# Patient Record
Sex: MALE | Race: WHITE | NOT HISPANIC OR LATINO | URBAN - METROPOLITAN AREA
[De-identification: names, ages, dates, MRNs, and addresses within clinical notes are randomized per-mention and may not be internally consistent; named-entity substitution may affect disease eponyms.]

---

## 2018-11-09 ENCOUNTER — INPATIENT (INPATIENT)
Facility: HOSPITAL | Age: 83
LOS: 10 days | Discharge: HOME | DRG: 286 | End: 2018-11-20
Attending: HOSPITALIST | Admitting: INTERNAL MEDICINE
Payer: MEDICARE

## 2018-11-09 VITALS
SYSTOLIC BLOOD PRESSURE: 187 MMHG | RESPIRATION RATE: 17 BRPM | OXYGEN SATURATION: 99 % | TEMPERATURE: 99 F | HEART RATE: 80 BPM | DIASTOLIC BLOOD PRESSURE: 71 MMHG

## 2018-11-09 LAB
ANION GAP SERPL CALC-SCNC: 17 MMOL/L — SIGNIFICANT CHANGE UP (ref 5–17)
BASOPHILS # BLD AUTO: 0 K/UL — SIGNIFICANT CHANGE UP (ref 0–0.2)
BASOPHILS NFR BLD AUTO: 0.2 % — SIGNIFICANT CHANGE UP (ref 0–2)
BUN SERPL-MCNC: 34 MG/DL — HIGH (ref 7–23)
CALCIUM SERPL-MCNC: 9.7 MG/DL — SIGNIFICANT CHANGE UP (ref 8.4–10.5)
CHLORIDE SERPL-SCNC: 104 MMOL/L — SIGNIFICANT CHANGE UP (ref 96–108)
CO2 SERPL-SCNC: 21 MMOL/L — LOW (ref 22–31)
CREAT SERPL-MCNC: 1.98 MG/DL — HIGH (ref 0.5–1.3)
EOSINOPHIL # BLD AUTO: 0.1 K/UL — SIGNIFICANT CHANGE UP (ref 0–0.5)
EOSINOPHIL NFR BLD AUTO: 1 % — SIGNIFICANT CHANGE UP (ref 0–6)
GLUCOSE SERPL-MCNC: 128 MG/DL — HIGH (ref 70–99)
HCT VFR BLD CALC: 35.7 % — LOW (ref 39–50)
HGB BLD-MCNC: 11.7 G/DL — LOW (ref 13–17)
LACTATE BLDV-MCNC: 2.1 MMOL/L — HIGH (ref 0.7–2)
LYMPHOCYTES # BLD AUTO: 1.6 K/UL — SIGNIFICANT CHANGE UP (ref 1–3.3)
LYMPHOCYTES # BLD AUTO: 20.4 % — SIGNIFICANT CHANGE UP (ref 13–44)
MCHC RBC-ENTMCNC: 29.5 PG — SIGNIFICANT CHANGE UP (ref 27–34)
MCHC RBC-ENTMCNC: 32.8 GM/DL — SIGNIFICANT CHANGE UP (ref 32–36)
MCV RBC AUTO: 89.9 FL — SIGNIFICANT CHANGE UP (ref 80–100)
MONOCYTES # BLD AUTO: 0.7 K/UL — SIGNIFICANT CHANGE UP (ref 0–0.9)
MONOCYTES NFR BLD AUTO: 9.3 % — SIGNIFICANT CHANGE UP (ref 2–14)
NEUTROPHILS # BLD AUTO: 5.3 K/UL — SIGNIFICANT CHANGE UP (ref 1.8–7.4)
NEUTROPHILS NFR BLD AUTO: 69.3 % — SIGNIFICANT CHANGE UP (ref 43–77)
NT-PROBNP SERPL-SCNC: 4122 PG/ML — HIGH (ref 0–300)
PLATELET # BLD AUTO: 148 K/UL — LOW (ref 150–400)
POTASSIUM SERPL-MCNC: 4.1 MMOL/L — SIGNIFICANT CHANGE UP (ref 3.5–5.3)
POTASSIUM SERPL-SCNC: 4.1 MMOL/L — SIGNIFICANT CHANGE UP (ref 3.5–5.3)
RBC # BLD: 3.97 M/UL — LOW (ref 4.2–5.8)
RBC # FLD: 15.3 % — HIGH (ref 10.3–14.5)
SODIUM SERPL-SCNC: 142 MMOL/L — SIGNIFICANT CHANGE UP (ref 135–145)
TROPONIN T, HIGH SENSITIVITY RESULT: 40 NG/L — SIGNIFICANT CHANGE UP (ref 0–51)
WBC # BLD: 7.7 K/UL — SIGNIFICANT CHANGE UP (ref 3.8–10.5)
WBC # FLD AUTO: 7.7 K/UL — SIGNIFICANT CHANGE UP (ref 3.8–10.5)

## 2018-11-09 PROCEDURE — 71045 X-RAY EXAM CHEST 1 VIEW: CPT | Mod: 26

## 2018-11-09 PROCEDURE — 99291 CRITICAL CARE FIRST HOUR: CPT

## 2018-11-09 RX ORDER — ACETAMINOPHEN 500 MG
975 TABLET ORAL ONCE
Qty: 0 | Refills: 0 | Status: DISCONTINUED | OUTPATIENT
Start: 2018-11-09 | End: 2018-11-10

## 2018-11-09 RX ORDER — AZITHROMYCIN 500 MG/1
500 TABLET, FILM COATED ORAL EVERY 24 HOURS
Qty: 0 | Refills: 0 | Status: DISCONTINUED | OUTPATIENT
Start: 2018-11-09 | End: 2018-11-10

## 2018-11-09 RX ORDER — FUROSEMIDE 40 MG
80 TABLET ORAL ONCE
Qty: 0 | Refills: 0 | Status: COMPLETED | OUTPATIENT
Start: 2018-11-09 | End: 2018-11-09

## 2018-11-09 RX ORDER — CEFTRIAXONE 500 MG/1
1 INJECTION, POWDER, FOR SOLUTION INTRAMUSCULAR; INTRAVENOUS EVERY 24 HOURS
Qty: 0 | Refills: 0 | Status: DISCONTINUED | OUTPATIENT
Start: 2018-11-09 | End: 2018-11-10

## 2018-11-09 RX ORDER — NITROGLYCERIN 6.5 MG
0.4 CAPSULE, EXTENDED RELEASE ORAL
Qty: 0 | Refills: 0 | Status: DISCONTINUED | OUTPATIENT
Start: 2018-11-09 | End: 2018-11-10

## 2018-11-09 RX ADMIN — Medication 0.4 MILLIGRAM(S): at 22:02

## 2018-11-09 RX ADMIN — Medication 80 MILLIGRAM(S): at 22:02

## 2018-11-09 RX ADMIN — CEFTRIAXONE 100 GRAM(S): 500 INJECTION, POWDER, FOR SOLUTION INTRAMUSCULAR; INTRAVENOUS at 23:21

## 2018-11-09 NOTE — ED ADULT NURSE NOTE - CHPI ED NUR SYMPTOMS NEG
no wheezing/no cough/no hemoptysis/no diaphoresis/no body aches/no headache/no fever/no chest pain/no chills

## 2018-11-09 NOTE — ED PROVIDER NOTE - CRITICAL CARE PROVIDED
direct patient care (not related to procedure)/documentation/consult w/ pt's family directly relating to pts condition/consultation with other physicians/interpretation of diagnostic studies/additional history taking

## 2018-11-09 NOTE — ED PROVIDER NOTE - OBJECTIVE STATEMENT
Nino BARRIENTOS MD PGY1: 83 M PMH CHF, diabetes, HTN, HLD p/w chest discomfort and SOB with increasing b/l LE swelling after not taking his furosemide for the past few days. No specific reason for lack of medication adherance. No radiation of pain to neck or lower extremity. 83 M PMH CHF, diabetes, HTN, HLD p/w chest discomfort and SOB with increasing b/l LE swelling after not taking his furosemide for the past 4 days. Not taking lasix because he was tired of urinating. No radiation of pain to neck or lower extremity.  Patient in significant resp distress, not able to contribute to history.  Son at bedside states no recent f/c, travel,.  Pt nod when asked re orthopnea and PND.  Significant exertional dyspnea.

## 2018-11-09 NOTE — ED PROVIDER NOTE - MEDICAL DECISION MAKING DETAILS
Nino BARRIENTOS MD PGY1: 83 M PMH CHF p/w difficulty breathing and chest discomfort since this am. WiIll stabilioze, place on BIPAP, give lasix, and admit. Nino BARRIENTOS MD PGY1: 83 M PMH CHF p/w difficulty breathing and chest discomfort since this am. WiIll stabilize, place on BIPAP, give lasix, and admit.  Attending Statement: Agree with the above.  congestive heart failure exacerbation with significant pulmonary edema, likely hyperadrenergic component given HTN.  BiPAP, SLN, labs, diurese, I/Os.  Admit.  May require CCU if no improvement on bipap.  --BMM

## 2018-11-09 NOTE — ED ADULT NURSE NOTE - OBJECTIVE STATEMENT
84 yo M w/ PMHx of CHF, anxiety presents to ED c/o difficulty breathing. Family at bedside state pt has been noted to be "working hard to breath" since today. Per family pt has been noncompliant w/ home dose of lasix over past several days. Pt appears anxious, unable to sit still, difficulty explaining complaints. Pt noted to have SpO2 88% on room air, 91% on 4 LPM O2 via nasal canula. MD Carlton at bedside, pt placed on BiPAP for respiratory distress. Pt appears less anxious w/ BiPAP in place. Pedal edema noted b/l. Pt also hypertensive & c/o chest discomfort, 0.4 mg SL nitro administered per orders w/ noted relief. Pt denies any CP, SOB, N/V, fever, chills, HA, dizziness, weakness. Pt A&Ox4, +central pulses. Abdomen soft, not tender, not distended. Safety and comfort maintained, no acute distress noted at this time.

## 2018-11-09 NOTE — ED PROVIDER NOTE - PMH
CHF (congestive heart failure)    HLD (hyperlipidemia)    HTN (hypertension)    T2DM (type 2 diabetes mellitus)

## 2018-11-09 NOTE — ED PROVIDER NOTE - PROGRESS NOTE DETAILS
BiPAP applied and SL nitro x 1 on pt presentation.  BP 180s-100s --> 150/90s with improvement of HR and work of breathing.  Patient significantly more comfortable on bipap.  Roberts placed for strict I/Os and lasix 80 mg ordered.

## 2018-11-09 NOTE — ED ADULT NURSE REASSESSMENT NOTE - NS ED NURSE REASSESS COMMENT FT1
Roberts catheter placed using sterile technique. Second RN present to confirm sterility. Draining to gravity. Secured w/ stat lock. Pt tolerated procedure well. Will cont to monitor.

## 2018-11-09 NOTE — ED ADULT NURSE NOTE - NSIMPLEMENTINTERV_GEN_ALL_ED
Implemented All Universal Safety Interventions:  [place] to call system. Call bell, personal items and telephone within reach. Instruct patient to call for assistance. Room bathroom lighting operational. Non-slip footwear when patient is off stretcher. Physically safe environment: no spills, clutter or unnecessary equipment. Stretcher in lowest position, wheels locked, appropriate side rails in place.

## 2018-11-09 NOTE — ED PROVIDER NOTE - PHYSICAL EXAMINATION
Nino BARRIENTOS MD PGY1:   PHYSICAL EXAM:    GENERAL: Uncomfortable, sitting up, difficulty breathing.   HEENT:  Atraumatic, Normocephalic  CHEST/LUNG: Chest rise equal bilaterally. Crackles bilaterally. Tachypneic.   HEART: Regular rate and rhythm. No murmur or rubs.  ABDOMEN: Soft, Nontender, Nondistended; Normoactive bowel sounds  EXTREMITIES:  2+ Peripheral Pulses. +3 pitting edema.   PSYCH: A&Ox3  SKIN: No obvious rashes or lesions GENERAL: Uncomfortable, sitting up, difficulty breathing.   HEENT:  Atraumatic, Normocephalic.  +JVD 3-4cm  CHEST/LUNG: Chest rise equal bilaterally. Crackles bilaterally. Tachypneic.   HEART: Regular rate and rhythm. No murmur or rubs.  ABDOMEN: Soft, Nontender, Nondistended; Normoactive bowel sounds  EXTREMITIES:  2+ Peripheral Pulses. +2 pitting edema to prox legs symmetrically  PSYCH: A&Ox3  SKIN: No obvious rashes or lesions    SOB significantly worse when patient ricky recumbent for repositioning in bed.

## 2018-11-10 DIAGNOSIS — N17.9 ACUTE KIDNEY FAILURE, UNSPECIFIED: ICD-10-CM

## 2018-11-10 DIAGNOSIS — J96.01 ACUTE RESPIRATORY FAILURE WITH HYPOXIA: ICD-10-CM

## 2018-11-10 DIAGNOSIS — I50.9 HEART FAILURE, UNSPECIFIED: ICD-10-CM

## 2018-11-10 DIAGNOSIS — Z71.89 OTHER SPECIFIED COUNSELING: ICD-10-CM

## 2018-11-10 DIAGNOSIS — N40.0 BENIGN PROSTATIC HYPERPLASIA WITHOUT LOWER URINARY TRACT SYMPTOMS: ICD-10-CM

## 2018-11-10 DIAGNOSIS — E11.8 TYPE 2 DIABETES MELLITUS WITH UNSPECIFIED COMPLICATIONS: ICD-10-CM

## 2018-11-10 DIAGNOSIS — R07.89 OTHER CHEST PAIN: ICD-10-CM

## 2018-11-10 DIAGNOSIS — I25.10 ATHEROSCLEROTIC HEART DISEASE OF NATIVE CORONARY ARTERY WITHOUT ANGINA PECTORIS: ICD-10-CM

## 2018-11-10 DIAGNOSIS — I50.21 ACUTE SYSTOLIC (CONGESTIVE) HEART FAILURE: ICD-10-CM

## 2018-11-10 DIAGNOSIS — I10 ESSENTIAL (PRIMARY) HYPERTENSION: ICD-10-CM

## 2018-11-10 DIAGNOSIS — R09.89 OTHER SPECIFIED SYMPTOMS AND SIGNS INVOLVING THE CIRCULATORY AND RESPIRATORY SYSTEMS: ICD-10-CM

## 2018-11-10 DIAGNOSIS — M79.89 OTHER SPECIFIED SOFT TISSUE DISORDERS: ICD-10-CM

## 2018-11-10 PROBLEM — Z00.00 ENCOUNTER FOR PREVENTIVE HEALTH EXAMINATION: Status: ACTIVE | Noted: 2018-11-10

## 2018-11-10 LAB
ALBUMIN SERPL ELPH-MCNC: 4 G/DL — SIGNIFICANT CHANGE UP (ref 3.3–5)
ALP SERPL-CCNC: 59 U/L — SIGNIFICANT CHANGE UP (ref 40–120)
ALT FLD-CCNC: 7 U/L — LOW (ref 10–45)
ANION GAP SERPL CALC-SCNC: 13 MMOL/L — SIGNIFICANT CHANGE UP (ref 5–17)
APPEARANCE UR: CLEAR — SIGNIFICANT CHANGE UP
APTT BLD: 25.3 SEC — LOW (ref 27.5–36.3)
AST SERPL-CCNC: 11 U/L — SIGNIFICANT CHANGE UP (ref 10–40)
BASOPHILS # BLD AUTO: 0 K/UL — SIGNIFICANT CHANGE UP (ref 0–0.2)
BASOPHILS NFR BLD AUTO: 0.4 % — SIGNIFICANT CHANGE UP (ref 0–2)
BILIRUB SERPL-MCNC: 0.7 MG/DL — SIGNIFICANT CHANGE UP (ref 0.2–1.2)
BILIRUB UR-MCNC: NEGATIVE — SIGNIFICANT CHANGE UP
BUN SERPL-MCNC: 34 MG/DL — HIGH (ref 7–23)
CALCIUM SERPL-MCNC: 9.5 MG/DL — SIGNIFICANT CHANGE UP (ref 8.4–10.5)
CHLORIDE SERPL-SCNC: 103 MMOL/L — SIGNIFICANT CHANGE UP (ref 96–108)
CHOLEST SERPL-MCNC: 110 MG/DL — SIGNIFICANT CHANGE UP (ref 10–199)
CK MB CFR SERPL CALC: 2.1 NG/ML — SIGNIFICANT CHANGE UP (ref 0–6.7)
CK SERPL-CCNC: 27 U/L — LOW (ref 30–200)
CO2 SERPL-SCNC: 24 MMOL/L — SIGNIFICANT CHANGE UP (ref 22–31)
COLOR SPEC: COLORLESS — SIGNIFICANT CHANGE UP
CREAT ?TM UR-MCNC: 17 MG/DL — SIGNIFICANT CHANGE UP
CREAT SERPL-MCNC: 2.13 MG/DL — HIGH (ref 0.5–1.3)
DIFF PNL FLD: NEGATIVE — SIGNIFICANT CHANGE UP
EOSINOPHIL # BLD AUTO: 0.1 K/UL — SIGNIFICANT CHANGE UP (ref 0–0.5)
EOSINOPHIL NFR BLD AUTO: 1.9 % — SIGNIFICANT CHANGE UP (ref 0–6)
GLUCOSE SERPL-MCNC: 101 MG/DL — HIGH (ref 70–99)
GLUCOSE UR QL: NEGATIVE — SIGNIFICANT CHANGE UP
HBA1C BLD-MCNC: 4.7 % — SIGNIFICANT CHANGE UP (ref 4–5.6)
HCT VFR BLD CALC: 32.2 % — LOW (ref 39–50)
HDLC SERPL-MCNC: 29 MG/DL — LOW
HGB BLD-MCNC: 10.6 G/DL — LOW (ref 13–17)
INR BLD: 1.12 RATIO — SIGNIFICANT CHANGE UP (ref 0.88–1.16)
KETONES UR-MCNC: NEGATIVE — SIGNIFICANT CHANGE UP
LACTATE SERPL-SCNC: 0.9 MMOL/L — SIGNIFICANT CHANGE UP (ref 0.7–2)
LEUKOCYTE ESTERASE UR-ACNC: NEGATIVE — SIGNIFICANT CHANGE UP
LIPID PNL WITH DIRECT LDL SERPL: 58 MG/DL — SIGNIFICANT CHANGE UP
LYMPHOCYTES # BLD AUTO: 0.8 K/UL — LOW (ref 1–3.3)
LYMPHOCYTES # BLD AUTO: 16.3 % — SIGNIFICANT CHANGE UP (ref 13–44)
MAGNESIUM SERPL-MCNC: 2.3 MG/DL — SIGNIFICANT CHANGE UP (ref 1.6–2.6)
MCHC RBC-ENTMCNC: 29.5 PG — SIGNIFICANT CHANGE UP (ref 27–34)
MCHC RBC-ENTMCNC: 32.9 GM/DL — SIGNIFICANT CHANGE UP (ref 32–36)
MCV RBC AUTO: 89.7 FL — SIGNIFICANT CHANGE UP (ref 80–100)
MONOCYTES # BLD AUTO: 0.5 K/UL — SIGNIFICANT CHANGE UP (ref 0–0.9)
MONOCYTES NFR BLD AUTO: 11 % — SIGNIFICANT CHANGE UP (ref 2–14)
NEUTROPHILS # BLD AUTO: 3.4 K/UL — SIGNIFICANT CHANGE UP (ref 1.8–7.4)
NEUTROPHILS NFR BLD AUTO: 70.3 % — SIGNIFICANT CHANGE UP (ref 43–77)
NITRITE UR-MCNC: NEGATIVE — SIGNIFICANT CHANGE UP
PH UR: 6 — SIGNIFICANT CHANGE UP (ref 5–8)
PHOSPHATE SERPL-MCNC: 4.4 MG/DL — SIGNIFICANT CHANGE UP (ref 2.5–4.5)
PLATELET # BLD AUTO: 130 K/UL — LOW (ref 150–400)
POTASSIUM SERPL-MCNC: 3.2 MMOL/L — LOW (ref 3.5–5.3)
POTASSIUM SERPL-SCNC: 3.2 MMOL/L — LOW (ref 3.5–5.3)
PROCALCITONIN SERPL-MCNC: 0.22 NG/ML — HIGH (ref 0.02–0.1)
PROT ?TM UR-MCNC: 15 MG/DL — HIGH (ref 0–12)
PROT SERPL-MCNC: 7 G/DL — SIGNIFICANT CHANGE UP (ref 6–8.3)
PROT UR-MCNC: NEGATIVE — SIGNIFICANT CHANGE UP
PROTHROM AB SERPL-ACNC: 12.8 SEC — SIGNIFICANT CHANGE UP (ref 10–12.9)
RBC # BLD: 3.59 M/UL — LOW (ref 4.2–5.8)
RBC # FLD: 15.2 % — HIGH (ref 10.3–14.5)
SODIUM SERPL-SCNC: 140 MMOL/L — SIGNIFICANT CHANGE UP (ref 135–145)
SODIUM UR-SCNC: 97 MMOL/L — SIGNIFICANT CHANGE UP
SP GR SPEC: 1.01 — LOW (ref 1.01–1.02)
TOTAL CHOLESTEROL/HDL RATIO MEASUREMENT: 3.8 RATIO — SIGNIFICANT CHANGE UP (ref 3.4–9.6)
TRIGL SERPL-MCNC: 115 MG/DL — SIGNIFICANT CHANGE UP (ref 10–149)
TROPONIN T, HIGH SENSITIVITY RESULT: 42 NG/L — SIGNIFICANT CHANGE UP (ref 0–51)
TSH SERPL-MCNC: 1.22 UIU/ML — SIGNIFICANT CHANGE UP (ref 0.27–4.2)
UROBILINOGEN FLD QL: NEGATIVE — SIGNIFICANT CHANGE UP
UUN UR-MCNC: 163 MG/DL — SIGNIFICANT CHANGE UP
WBC # BLD: 4.8 K/UL — SIGNIFICANT CHANGE UP (ref 3.8–10.5)
WBC # FLD AUTO: 4.8 K/UL — SIGNIFICANT CHANGE UP (ref 3.8–10.5)

## 2018-11-10 PROCEDURE — 12345: CPT | Mod: NC

## 2018-11-10 PROCEDURE — 99223 1ST HOSP IP/OBS HIGH 75: CPT

## 2018-11-10 PROCEDURE — 71250 CT THORAX DX C-: CPT | Mod: 26

## 2018-11-10 PROCEDURE — 99497 ADVNCD CARE PLAN 30 MIN: CPT | Mod: 25

## 2018-11-10 RX ORDER — FLUTICASONE FUROATE AND VILANTEROL TRIFENATATE 100; 25 UG/1; UG/1
1 POWDER RESPIRATORY (INHALATION)
Qty: 0 | Refills: 0 | COMMUNITY

## 2018-11-10 RX ORDER — HYDRALAZINE HCL 50 MG
100 TABLET ORAL EVERY 8 HOURS
Qty: 0 | Refills: 0 | Status: DISCONTINUED | OUTPATIENT
Start: 2018-11-10 | End: 2018-11-20

## 2018-11-10 RX ORDER — DEXTROSE 50 % IN WATER 50 %
15 SYRINGE (ML) INTRAVENOUS ONCE
Qty: 0 | Refills: 0 | Status: DISCONTINUED | OUTPATIENT
Start: 2018-11-10 | End: 2018-11-20

## 2018-11-10 RX ORDER — PANTOPRAZOLE SODIUM 20 MG/1
40 TABLET, DELAYED RELEASE ORAL
Qty: 0 | Refills: 0 | Status: DISCONTINUED | OUTPATIENT
Start: 2018-11-10 | End: 2018-11-20

## 2018-11-10 RX ORDER — ASPIRIN/CALCIUM CARB/MAGNESIUM 324 MG
81 TABLET ORAL DAILY
Qty: 0 | Refills: 0 | Status: DISCONTINUED | OUTPATIENT
Start: 2018-11-10 | End: 2018-11-20

## 2018-11-10 RX ORDER — DOXAZOSIN MESYLATE 4 MG
2 TABLET ORAL AT BEDTIME
Qty: 0 | Refills: 0 | Status: DISCONTINUED | OUTPATIENT
Start: 2018-11-10 | End: 2018-11-20

## 2018-11-10 RX ORDER — FUROSEMIDE 40 MG
60 TABLET ORAL
Qty: 0 | Refills: 0 | Status: DISCONTINUED | OUTPATIENT
Start: 2018-11-10 | End: 2018-11-12

## 2018-11-10 RX ORDER — SERTRALINE 25 MG/1
200 TABLET, FILM COATED ORAL DAILY
Qty: 0 | Refills: 0 | Status: DISCONTINUED | OUTPATIENT
Start: 2018-11-10 | End: 2018-11-20

## 2018-11-10 RX ORDER — ACETAMINOPHEN 500 MG
1000 TABLET ORAL ONCE
Qty: 0 | Refills: 0 | Status: COMPLETED | OUTPATIENT
Start: 2018-11-10 | End: 2018-11-10

## 2018-11-10 RX ORDER — POTASSIUM CHLORIDE 20 MEQ
20 PACKET (EA) ORAL
Qty: 0 | Refills: 0 | Status: COMPLETED | OUTPATIENT
Start: 2018-11-10 | End: 2018-11-10

## 2018-11-10 RX ORDER — HEPARIN SODIUM 5000 [USP'U]/ML
5000 INJECTION INTRAVENOUS; SUBCUTANEOUS EVERY 8 HOURS
Qty: 0 | Refills: 0 | Status: DISCONTINUED | OUTPATIENT
Start: 2018-11-10 | End: 2018-11-20

## 2018-11-10 RX ORDER — NIFEDIPINE 30 MG
90 TABLET, EXTENDED RELEASE 24 HR ORAL DAILY
Qty: 0 | Refills: 0 | Status: DISCONTINUED | OUTPATIENT
Start: 2018-11-10 | End: 2018-11-18

## 2018-11-10 RX ORDER — SIMVASTATIN 20 MG/1
1 TABLET, FILM COATED ORAL
Qty: 0 | Refills: 0 | COMMUNITY

## 2018-11-10 RX ORDER — INSULIN LISPRO 100/ML
VIAL (ML) SUBCUTANEOUS AT BEDTIME
Qty: 0 | Refills: 0 | Status: DISCONTINUED | OUTPATIENT
Start: 2018-11-10 | End: 2018-11-20

## 2018-11-10 RX ORDER — INFLUENZA VIRUS VACCINE 15; 15; 15; 15 UG/.5ML; UG/.5ML; UG/.5ML; UG/.5ML
0.5 SUSPENSION INTRAMUSCULAR ONCE
Qty: 0 | Refills: 0 | Status: DISCONTINUED | OUTPATIENT
Start: 2018-11-10 | End: 2018-11-20

## 2018-11-10 RX ORDER — SIMVASTATIN 20 MG/1
20 TABLET, FILM COATED ORAL AT BEDTIME
Qty: 0 | Refills: 0 | Status: DISCONTINUED | OUTPATIENT
Start: 2018-11-10 | End: 2018-11-20

## 2018-11-10 RX ORDER — DEXTROSE 50 % IN WATER 50 %
12.5 SYRINGE (ML) INTRAVENOUS ONCE
Qty: 0 | Refills: 0 | Status: DISCONTINUED | OUTPATIENT
Start: 2018-11-10 | End: 2018-11-20

## 2018-11-10 RX ORDER — SENNA PLUS 8.6 MG/1
2 TABLET ORAL AT BEDTIME
Qty: 0 | Refills: 0 | Status: DISCONTINUED | OUTPATIENT
Start: 2018-11-10 | End: 2018-11-20

## 2018-11-10 RX ORDER — OXYCODONE AND ACETAMINOPHEN 5; 325 MG/1; MG/1
1 TABLET ORAL EVERY 6 HOURS
Qty: 0 | Refills: 0 | Status: DISCONTINUED | OUTPATIENT
Start: 2018-11-10 | End: 2018-11-13

## 2018-11-10 RX ORDER — METOPROLOL TARTRATE 50 MG
50 TABLET ORAL
Qty: 0 | Refills: 0 | Status: DISCONTINUED | OUTPATIENT
Start: 2018-11-10 | End: 2018-11-20

## 2018-11-10 RX ORDER — INSULIN LISPRO 100/ML
VIAL (ML) SUBCUTANEOUS
Qty: 0 | Refills: 0 | Status: DISCONTINUED | OUTPATIENT
Start: 2018-11-10 | End: 2018-11-20

## 2018-11-10 RX ORDER — DOCUSATE SODIUM 100 MG
100 CAPSULE ORAL THREE TIMES A DAY
Qty: 0 | Refills: 0 | Status: DISCONTINUED | OUTPATIENT
Start: 2018-11-10 | End: 2018-11-20

## 2018-11-10 RX ORDER — SENNA PLUS 8.6 MG/1
2 TABLET ORAL
Qty: 0 | Refills: 0 | COMMUNITY

## 2018-11-10 RX ORDER — SODIUM CHLORIDE 9 MG/ML
1000 INJECTION, SOLUTION INTRAVENOUS
Qty: 0 | Refills: 0 | Status: DISCONTINUED | OUTPATIENT
Start: 2018-11-10 | End: 2018-11-20

## 2018-11-10 RX ORDER — BUDESONIDE AND FORMOTEROL FUMARATE DIHYDRATE 160; 4.5 UG/1; UG/1
2 AEROSOL RESPIRATORY (INHALATION)
Qty: 0 | Refills: 0 | Status: DISCONTINUED | OUTPATIENT
Start: 2018-11-10 | End: 2018-11-20

## 2018-11-10 RX ORDER — CLOPIDOGREL BISULFATE 75 MG/1
75 TABLET, FILM COATED ORAL DAILY
Qty: 0 | Refills: 0 | Status: DISCONTINUED | OUTPATIENT
Start: 2018-11-10 | End: 2018-11-20

## 2018-11-10 RX ADMIN — OXYCODONE AND ACETAMINOPHEN 1 TABLET(S): 5; 325 TABLET ORAL at 11:16

## 2018-11-10 RX ADMIN — Medication 81 MILLIGRAM(S): at 11:00

## 2018-11-10 RX ADMIN — Medication 400 MILLIGRAM(S): at 05:25

## 2018-11-10 RX ADMIN — Medication 1000 MILLIGRAM(S): at 06:42

## 2018-11-10 RX ADMIN — Medication 20 MILLIEQUIVALENT(S): at 17:29

## 2018-11-10 RX ADMIN — OXYCODONE AND ACETAMINOPHEN 1 TABLET(S): 5; 325 TABLET ORAL at 18:00

## 2018-11-10 RX ADMIN — HEPARIN SODIUM 5000 UNIT(S): 5000 INJECTION INTRAVENOUS; SUBCUTANEOUS at 15:09

## 2018-11-10 RX ADMIN — HEPARIN SODIUM 5000 UNIT(S): 5000 INJECTION INTRAVENOUS; SUBCUTANEOUS at 21:20

## 2018-11-10 RX ADMIN — Medication 60 MILLIGRAM(S): at 17:24

## 2018-11-10 RX ADMIN — BUDESONIDE AND FORMOTEROL FUMARATE DIHYDRATE 2 PUFF(S): 160; 4.5 AEROSOL RESPIRATORY (INHALATION) at 17:24

## 2018-11-10 RX ADMIN — PANTOPRAZOLE SODIUM 40 MILLIGRAM(S): 20 TABLET, DELAYED RELEASE ORAL at 11:01

## 2018-11-10 RX ADMIN — Medication 100 MILLIGRAM(S): at 21:20

## 2018-11-10 RX ADMIN — SIMVASTATIN 20 MILLIGRAM(S): 20 TABLET, FILM COATED ORAL at 21:20

## 2018-11-10 RX ADMIN — SENNA PLUS 2 TABLET(S): 8.6 TABLET ORAL at 21:20

## 2018-11-10 RX ADMIN — CLOPIDOGREL BISULFATE 75 MILLIGRAM(S): 75 TABLET, FILM COATED ORAL at 11:00

## 2018-11-10 RX ADMIN — Medication 50 MILLIGRAM(S): at 11:01

## 2018-11-10 RX ADMIN — BUDESONIDE AND FORMOTEROL FUMARATE DIHYDRATE 2 PUFF(S): 160; 4.5 AEROSOL RESPIRATORY (INHALATION) at 05:30

## 2018-11-10 RX ADMIN — OXYCODONE AND ACETAMINOPHEN 1 TABLET(S): 5; 325 TABLET ORAL at 17:25

## 2018-11-10 RX ADMIN — Medication 2 MILLIGRAM(S): at 21:20

## 2018-11-10 RX ADMIN — Medication 50 MILLIGRAM(S): at 17:25

## 2018-11-10 RX ADMIN — AZITHROMYCIN 250 MILLIGRAM(S): 500 TABLET, FILM COATED ORAL at 01:07

## 2018-11-10 RX ADMIN — Medication 20 MILLIEQUIVALENT(S): at 20:22

## 2018-11-10 RX ADMIN — HEPARIN SODIUM 5000 UNIT(S): 5000 INJECTION INTRAVENOUS; SUBCUTANEOUS at 05:57

## 2018-11-10 RX ADMIN — Medication 60 MILLIGRAM(S): at 05:23

## 2018-11-10 RX ADMIN — Medication 100 MILLIGRAM(S): at 11:00

## 2018-11-10 RX ADMIN — OXYCODONE AND ACETAMINOPHEN 1 TABLET(S): 5; 325 TABLET ORAL at 12:00

## 2018-11-10 RX ADMIN — Medication 90 MILLIGRAM(S): at 20:21

## 2018-11-10 RX ADMIN — SERTRALINE 200 MILLIGRAM(S): 25 TABLET, FILM COATED ORAL at 11:01

## 2018-11-10 NOTE — H&P ADULT - HISTORY OF PRESENT ILLNESS
83M, trilingual Chadian/Martiniquais speaking, c hx CAD s/p 6 stents (last stent 6 years ago), CHF, CKD (unknown baseline), DM2, HTN, HLD, chronic R lower back pain and chronic suprapubic pain, former smoker, presents with 3 days of increasing SOB, leg swelling, and chest pain.    History obtained from patient. At baseline, pt ambulates with a walker, and has significant difficulty ambulating up a flight of stairs. He has been taking all of his medications except the lasix for the past 4 days, because he was urinating too often. [place] [facility] [place] [facility] in [place], but since his wife passed away 1.5 months ago, has been living with his daughter Mary Verdin in [place]. Pt was last hospitalized about 6-7 months ago with the same SOB. Over the past 3 days, reports increasing SOB, respiratory distress, leg swelling, some right chest pain that radiates to b/l arms, and some right flank pain that started yesterday. Denies fevers, chills, URI symptoms. Not on O2 at home. He has had 4 falls in 2018, but not recently. BM yesterday.    VS: Tm 98.8, P 105, /71, R 32, 85% RA when I saw the patient. 100% on bipap  In the ED, received azithro, ceftri, lasix 80IV, tylenol, NTG 83M, trilingual Tamazight/Malawian speaking, c hx CAD s/p 6 stents (last stent 6 years ago), CHF, CKD (unknown baseline), DM2, HTN, HLD, chronic R lower back pain and chronic suprapubic pain, former smoker, presents with 3 days of increasing SOB, leg swelling, and chest pain.    History obtained from patient. At baseline, pt ambulates with a walker, and has significant difficulty ambulating up a flight of stairs. He has been taking all of his medications except the lasix for the past 4 days, because he was urinating too often. [place] [facility] [place] [facility] in [place], but since his wife passed away 1.5 months ago, has been living with his daughter Mary Verdin in [place]. Pt was last hospitalized about 6-7 months ago with the same SOB. Over the past 3 days, reports increasing SOB, respiratory distress, leg swelling, some right chest pain that radiates to b/l arms, and some right flank pain that started yesterday. Denies fevers, chills, URI symptoms. Not on O2 at home. He has had 4 falls in 2018, but not recently. BM yesterday.    VS: Tm 98.8, P 105, /71, R 32, 85% RA when I saw the patient. 100% on bipap  In the ED, received azithro, ceftri, lasix 80IV, tylenol, NTG    ISTOP Reference #: [id number]  Rx Written	Rx Dispensed	Drug	Quantity	Days Supply	Prescriber Name  10/08/2018	10/09/2018	oxycodone-acetaminophen 5-325 mg tab	60	30	Anthony Arellano MD  08/28/2018	09/10/2018	oxycodone-acetaminophen 5-325 mg tab	60	30	Anthony Arellano MD 83M, trilingual Romanian/Central African speaking, c hx CAD s/p 6 stents (last stent 6 years ago), CHF, CKD (unknown baseline), DM2, HTN, HLD, chronic R lower back pain and chronic suprapubic pain, former smoker, presents with 3 days of increasing SOB, leg swelling, and chest pain.    History obtained from patient. At baseline, pt ambulates with a walker, and has significant difficulty ambulating up a flight of stairs. He has been taking all of his medications except the lasix for the past 4 days, because he was urinating too often. Pt [place] [facility] and goes to [facility] in [place], but since his wife passed away 1.5 months ago, has been living with his daughter Mary Verdin in [place]. Pt was last hospitalized about 6-7 months ago with the same SOB. Over the past 3 days, reports increasing SOB, respiratory distress, leg swelling, some right chest pain that radiates to b/l arms, and some right flank pain that started yesterday. Denies fevers, chills, URI symptoms. Not on O2 at home. He has had 4 falls in 2018, but not recently. BM yesterday.    VS: Tm 98.8, P 105, /71, R 32, 85% RA when I saw the patient. 100% on bipap  In the ED, received azithro, ceftri, lasix 80IV, tylenol, NTG    ISTOP Reference #: [id number]  Rx Written	Rx Dispensed	Drug	Quantity	Days Supply	Prescriber Name  10/08/2018	10/09/2018	oxycodone-acetaminophen 5-325 mg tab	60	30	Anthony Arellano MD  08/28/2018	09/10/2018	oxycodone-acetaminophen 5-325 mg tab	60	30	Anthony Arellano MD  08/29/2018	08/30/2018	lorazepam 1 mg tablet	60	30	Allan Monreal

## 2018-11-10 NOTE — H&P ADULT - ASSESSMENT
83M, trilingual Angolan/Estonian speaking, c hx CAD s/p 6 stents (last stent 6 years ago), CHF, CKD (unknown baseline), DM2, HTN, HLD, chronic R lower back pain and chronic suprapubic pain, former smoker, pw hypoxic resp failure 2/2 pulm edema likely 2/2 CHF exacerbation and poss RAMÍREZ.

## 2018-11-10 NOTE — H&P ADULT - PROBLEM SELECTOR PLAN 8
- pt requests full code  - pt has 4 children, but wants his daughter Mary Perez to make decisions for him if pt is unable to.  - pt requests full medical management  - time spent 20 min - pt's bmp glc is 120s.  - pt states he has been giving himself variable lantus, but usually around 7U a night. but he didn't give himself anything lantus last night.  - check a1c, tsh, lipids

## 2018-11-10 NOTE — H&P ADULT - PROBLEM SELECTOR PLAN 1
- 2/2 pulm edema  - cont diuresis as below  - cont bipap for now, and trial of NC in AM  - if persistent hypoxia, consider CT chest  - hold off antibiotics, as pt does not endorse s/s active infection/URI/PNA symptoms

## 2018-11-10 NOTE — H&P ADULT - NSHPPHYSICALEXAM_GEN_ALL_CORE
PHYSICAL EXAM:   GENERAL: Alert. Not confused. No acute distress. Not thin. Not cachectic. Not obese.  HEAD:  Atraumatic. Normocephalic.  EYES: EOMI. PERRLA. Normal conjunctiva/sclera.  ENT: Neck supple. No JVD. Moist oral mucosa. Not edentulous. No thrush.  LYMPH: Normal supraclavicular/cervical lymph nodes.   CARDIAC: +tachy, Not kim. Regular rhythm. Not irregularly irregular. S1. S2. +Grade 4 systolic murmur. No rub. No distant heart sounds.  LUNG/CHEST: No wheezes. +rales R>L. No rhonchi.  ABDOMEN: Soft. +left inguinal/suprapubic tenderness. No distension. No fluid wave. Normal bowel sounds.  BACK: No midline/vertebral tenderness. +right flank tenderness.  VASCULAR: +2 b/l radial or ulnar pulses. Palpable DP pulses.  EXTREMITIES:  No clubbing. No cyanosis. +2 b/l LE pitting edema. Moving all 4.  NEUROLOGY: A&Ox3. Non-focal exam. Cranial nerves intact. Normal speech. Sensation intact.  PSYCH: Normal behavior. Normal affect.  SKIN: No jaundice. No erythema. No rash/lesion.  Vascular Access:     ICU Vital Signs Last 24 Hrs  T(C): 37.1 (09 Nov 2018 21:10), Max: 37.1 (09 Nov 2018 21:10)  T(F): 98.8 (09 Nov 2018 21:10), Max: 98.8 (09 Nov 2018 21:10)  HR: 66 (10 Nov 2018 03:48) (64 - 105)  BP: 170/60 (10 Nov 2018 03:48) (131/74 - 188/73)  BP(mean): --  ABP: --  ABP(mean): --  RR: 24 (10 Nov 2018 05:00) (17 - 32)  SpO2: 85% (10 Nov 2018 05:00) (85% - 100%)      I&O's Summary    09 Nov 2018 07:01  -  10 Nov 2018 05:13  --------------------------------------------------------  IN: 0 mL / OUT: 2000 mL / NET: -2000 mL

## 2018-11-10 NOTE — H&P ADULT - PROBLEM SELECTOR PROBLEM 6
Benign prostatic hyperplasia without lower urinary tract symptoms Coronary artery disease involving native coronary artery of native heart, angina presence unspecified

## 2018-11-10 NOTE — H&P ADULT - NSHPOUTPATIENTPROVIDERS_GEN_ALL_CORE
Dr. Allan Monreal (PMD [phone]), Dr. Varun Zapata (Card [phone]), Dr. Chang Matthews (Renal [phone]), Dr. Anthony Arellano (pain [phone])

## 2018-11-10 NOTE — H&P ADULT - PROBLEM SELECTOR PLAN 3
- doubt plaque rupture acs  - trend CE  - tele  - cont asa, plavix  - hold off hep gtt  - consider further ischemic eval pending resolution of CHF exacerbation  - cont metoprolol, hydralazine prn

## 2018-11-10 NOTE — H&P ADULT - PROBLEM SELECTOR PLAN 7
- cont pt's home BP meds at reduced doses  - cont metoprolol 50 BID + nifedpine 90 xl + hydralazine 100 PRN

## 2018-11-10 NOTE — H&P ADULT - REASON FOR ADMISSION
Anesthetic History   No history of anesthetic complications            Review of Systems / Medical History  Patient summary reviewed, nursing notes reviewed and pertinent labs reviewed    Pulmonary  Within defined limits                 Neuro/Psych   Within defined limits           Cardiovascular  Within defined limits                     GI/Hepatic/Renal  Within defined limits              Endo/Other  Within defined limits           Other Findings              Physical Exam    Airway  Mallampati: II  TM Distance: 4 - 6 cm  Neck ROM: normal range of motion   Mouth opening: Normal     Cardiovascular    Rhythm: regular  Rate: normal         Dental  No notable dental hx       Pulmonary  Breath sounds clear to auscultation               Abdominal         Other Findings            Anesthetic Plan    ASA: 2  Anesthesia type: epidural            Anesthetic plan and risks discussed with: Patient
respiratory distress, SOB, chest pain

## 2018-11-10 NOTE — H&P ADULT - PROBLEM SELECTOR PLAN 2
- systolic murmur on exam  - tele  - TTE  - CE  - lasix 60 IV BID for now  - strict I&O  - cont metoprolol, nifedpine

## 2018-11-10 NOTE — H&P ADULT - PROBLEM SELECTOR PROBLEM 8
Advanced care planning/counseling discussion Type 2 diabetes mellitus with complication, with long-term current use of insulin

## 2018-11-10 NOTE — H&P ADULT - NSHPREVIEWOFSYSTEMS_GEN_ALL_CORE
REVIEW OF SYSTEMS:  CONSTITUTIONAL: No weakness. No fevers. No chills. No rigors. No weight loss. No poor appetite.  EYES: No visual changes. No eye pain.  ENT: No hearing difficulty. No vertigo. No dysphagia. No Sinusitis/rhinorrhea.  NECK: No pain. No stiffness/rigidity.  CARDIAC: +chest pain. No palpitations.  RESPIRATORY: No cough. +SOB. No hemoptysis.  GASTROINTESTINAL: +abdominal pain. +nausea. No vomiting. No hematemesis. No diarrhea. No constipation. No melena. No hematochezia.  GENITOURINARY: No dysuria. No frequency. No hesitancy. No hematuria.  NEUROLOGICAL: No numbness/tingling. No focal weakness. No incontinence. No headache.  BACK: +back pain.  EXTREMITIES: +lower extremity edema. Full ROM.  SKIN: No rashes. No itching. No other lesions.  PSYCHIATRIC: No depression. No anxiety. No SI/HI.  ALLERGIC: No lip swelling. No hives.  All other review of systems is negative unless indicated above.  Unless indicated above, unable to assess ROS 2/2

## 2018-11-10 NOTE — H&P ADULT - PROBLEM SELECTOR PLAN 6
- cont doxazosin  - TOV in 1-2 days with clinical improvement - cont asa, plavix as above + zocor  - ischemic eval once clinically improved

## 2018-11-10 NOTE — H&P ADULT - PROBLEM SELECTOR PLAN 4
- unknown baseline Cr  - cont jimenez for now  - diuresis as above  - urine lytes  - renal u/s. also to eval right flank pain. - unknown baseline Cr  - cont jimenez for now  - diuresis as above  - urine lytes  - renal u/s. also to eval right flank pain.  - percocet PRN for chronic back pain

## 2018-11-10 NOTE — H&P ADULT - NSHPSOCIALHISTORY_GEN_ALL_CORE
Social History:    Marital Status: (  ) , (  ) Single, (  ) , ( x ) , (  )   # of Children: 2 sons, 2 daughters  Lives with: (  ) alone, ( x ) children, (  ) spouse, (  ) parents, (  ) siblings, (  ) friends, (  ) other:   Occupation: retired     Substance Use/Illicit Drugs: (  ) never used vs other:   Tobacco Usage: (  ) never smoked, ( x ) former smoker, (  ) current smoker and Total Pack-Years: 45 years  Last Alcohol Usage/Frequency/Amount/Withdrawal/Hx of Abuse:  1 year ago  Foreign travel/Animal exposure:

## 2018-11-10 NOTE — H&P ADULT - NSHPLABSRESULTS_GEN_ALL_CORE
Personally reviewed labs.   Personally reviewed imaging.   Personally reviewed EKG. ST rate 104, . Q wave in V1. LVH. No ST or TW changes.                          11.7   7.7   )-----------( 148      ( 2018 22:34 )             35.7           142  |  104  |  34<H>  ----------------------------<  128<H>  4.1   |  21<L>  |  1.98<H>    Ca    9.7      2018 22:34                      Urinalysis Basic - ( 2018 23:40 )    Color: Colorless / Appearance: Clear / S.007 / pH: x  Gluc: x / Ketone: Negative  / Bili: Negative / Urobili: Negative   Blood: x / Protein: Negative / Nitrite: Negative   Leuk Esterase: Negative / RBC: x / WBC x   Sq Epi: x / Non Sq Epi: x / Bacteria: x

## 2018-11-10 NOTE — H&P ADULT - PROBLEM SELECTOR PLAN 10
- pt requests full code  - pt has 4 children, but wants his daughter Mary Perez to make decisions for him if pt is unable to.  - pt requests full medical management  - time spent 20 min

## 2018-11-11 LAB
ANION GAP SERPL CALC-SCNC: 13 MMOL/L — SIGNIFICANT CHANGE UP (ref 5–17)
BUN SERPL-MCNC: 44 MG/DL — HIGH (ref 7–23)
CALCIUM SERPL-MCNC: 9.1 MG/DL — SIGNIFICANT CHANGE UP (ref 8.4–10.5)
CHLORIDE SERPL-SCNC: 105 MMOL/L — SIGNIFICANT CHANGE UP (ref 96–108)
CO2 SERPL-SCNC: 27 MMOL/L — SIGNIFICANT CHANGE UP (ref 22–31)
CREAT SERPL-MCNC: 2.47 MG/DL — HIGH (ref 0.5–1.3)
CULTURE RESULTS: NO GROWTH — SIGNIFICANT CHANGE UP
GLUCOSE SERPL-MCNC: 137 MG/DL — HIGH (ref 70–99)
HCT VFR BLD CALC: 29.1 % — LOW (ref 39–50)
HGB BLD-MCNC: 9.5 G/DL — LOW (ref 13–17)
MCHC RBC-ENTMCNC: 30.1 PG — SIGNIFICANT CHANGE UP (ref 27–34)
MCHC RBC-ENTMCNC: 32.6 GM/DL — SIGNIFICANT CHANGE UP (ref 32–36)
MCV RBC AUTO: 92.1 FL — SIGNIFICANT CHANGE UP (ref 80–100)
PLATELET # BLD AUTO: 104 K/UL — LOW (ref 150–400)
POTASSIUM SERPL-MCNC: 2.9 MMOL/L — CRITICAL LOW (ref 3.5–5.3)
POTASSIUM SERPL-SCNC: 2.9 MMOL/L — CRITICAL LOW (ref 3.5–5.3)
RBC # BLD: 3.16 M/UL — LOW (ref 4.2–5.8)
RBC # FLD: 15.8 % — HIGH (ref 10.3–14.5)
SODIUM SERPL-SCNC: 145 MMOL/L — SIGNIFICANT CHANGE UP (ref 135–145)
SPECIMEN SOURCE: SIGNIFICANT CHANGE UP
WBC # BLD: 3.55 K/UL — LOW (ref 3.8–10.5)
WBC # FLD AUTO: 3.55 K/UL — LOW (ref 3.8–10.5)

## 2018-11-11 PROCEDURE — 99233 SBSQ HOSP IP/OBS HIGH 50: CPT

## 2018-11-11 RX ORDER — ACETAMINOPHEN 500 MG
650 TABLET ORAL ONCE
Qty: 0 | Refills: 0 | Status: COMPLETED | OUTPATIENT
Start: 2018-11-11 | End: 2018-11-11

## 2018-11-11 RX ORDER — POTASSIUM CHLORIDE 20 MEQ
40 PACKET (EA) ORAL ONCE
Qty: 0 | Refills: 0 | Status: COMPLETED | OUTPATIENT
Start: 2018-11-11 | End: 2018-11-11

## 2018-11-11 RX ORDER — ACETAMINOPHEN 500 MG
1000 TABLET ORAL ONCE
Qty: 0 | Refills: 0 | Status: COMPLETED | OUTPATIENT
Start: 2018-11-11 | End: 2018-11-11

## 2018-11-11 RX ORDER — POTASSIUM CHLORIDE 20 MEQ
40 PACKET (EA) ORAL EVERY 4 HOURS
Qty: 0 | Refills: 0 | Status: DISCONTINUED | OUTPATIENT
Start: 2018-11-11 | End: 2018-11-11

## 2018-11-11 RX ORDER — POTASSIUM CHLORIDE 20 MEQ
40 PACKET (EA) ORAL EVERY 4 HOURS
Qty: 0 | Refills: 0 | Status: COMPLETED | OUTPATIENT
Start: 2018-11-11 | End: 2018-11-11

## 2018-11-11 RX ADMIN — OXYCODONE AND ACETAMINOPHEN 1 TABLET(S): 5; 325 TABLET ORAL at 02:36

## 2018-11-11 RX ADMIN — HEPARIN SODIUM 5000 UNIT(S): 5000 INJECTION INTRAVENOUS; SUBCUTANEOUS at 13:15

## 2018-11-11 RX ADMIN — Medication 40 MILLIEQUIVALENT(S): at 06:40

## 2018-11-11 RX ADMIN — CLOPIDOGREL BISULFATE 75 MILLIGRAM(S): 75 TABLET, FILM COATED ORAL at 12:23

## 2018-11-11 RX ADMIN — Medication 100 MILLIGRAM(S): at 21:26

## 2018-11-11 RX ADMIN — HEPARIN SODIUM 5000 UNIT(S): 5000 INJECTION INTRAVENOUS; SUBCUTANEOUS at 21:26

## 2018-11-11 RX ADMIN — Medication 50 MILLIGRAM(S): at 17:06

## 2018-11-11 RX ADMIN — Medication 60 MILLIGRAM(S): at 17:06

## 2018-11-11 RX ADMIN — BUDESONIDE AND FORMOTEROL FUMARATE DIHYDRATE 2 PUFF(S): 160; 4.5 AEROSOL RESPIRATORY (INHALATION) at 17:53

## 2018-11-11 RX ADMIN — Medication 2 MILLIGRAM(S): at 21:26

## 2018-11-11 RX ADMIN — Medication 40 MILLIEQUIVALENT(S): at 09:05

## 2018-11-11 RX ADMIN — BUDESONIDE AND FORMOTEROL FUMARATE DIHYDRATE 2 PUFF(S): 160; 4.5 AEROSOL RESPIRATORY (INHALATION) at 05:18

## 2018-11-11 RX ADMIN — Medication 650 MILLIGRAM(S): at 14:15

## 2018-11-11 RX ADMIN — Medication 81 MILLIGRAM(S): at 12:23

## 2018-11-11 RX ADMIN — Medication 50 MILLIGRAM(S): at 05:20

## 2018-11-11 RX ADMIN — SIMVASTATIN 20 MILLIGRAM(S): 20 TABLET, FILM COATED ORAL at 21:26

## 2018-11-11 RX ADMIN — OXYCODONE AND ACETAMINOPHEN 1 TABLET(S): 5; 325 TABLET ORAL at 01:33

## 2018-11-11 RX ADMIN — HEPARIN SODIUM 5000 UNIT(S): 5000 INJECTION INTRAVENOUS; SUBCUTANEOUS at 05:19

## 2018-11-11 RX ADMIN — Medication 650 MILLIGRAM(S): at 13:46

## 2018-11-11 RX ADMIN — PANTOPRAZOLE SODIUM 40 MILLIGRAM(S): 20 TABLET, DELAYED RELEASE ORAL at 06:10

## 2018-11-11 RX ADMIN — Medication 100 MILLIGRAM(S): at 05:19

## 2018-11-11 RX ADMIN — SERTRALINE 200 MILLIGRAM(S): 25 TABLET, FILM COATED ORAL at 12:24

## 2018-11-11 RX ADMIN — Medication 90 MILLIGRAM(S): at 05:21

## 2018-11-11 RX ADMIN — Medication 60 MILLIGRAM(S): at 05:19

## 2018-11-11 RX ADMIN — Medication 1: at 17:06

## 2018-11-11 RX ADMIN — Medication 40 MILLIEQUIVALENT(S): at 13:15

## 2018-11-11 NOTE — PHYSICAL THERAPY INITIAL EVALUATION ADULT - ADDITIONAL COMMENTS
Pt lives with his DTR in a private house w/ 4 steps and 1 HR to neg. Pt amb w/ RW PTA. Pt required assistance from family w/ ADL's PTA.

## 2018-11-11 NOTE — PHYSICAL THERAPY INITIAL EVALUATION ADULT - GAIT TRAINING, PT EVAL
GOAL: Patient will ambulate 100 feet independently with RW in 1 week. Pt will be able to neg 4 steps w/ 1 HR and sup.

## 2018-11-11 NOTE — PHYSICAL THERAPY INITIAL EVALUATION ADULT - PATIENT/FAMILY/SIGNIFICANT OTHER GOALS STATEMENT, PT EVAL
Progress Note


pt seen and examed, d/w PA about key points of dignosis and care plan, agreed 

current management, for details please referral to PA's  note 


Slowly improving, I feel she improved at least  30% compared to yesterday 


although bed sitting up, we'll continue slowly tapering of steroid, continue 

breathing treatment, will agree to send to [facility] rehabilitation if bed 

available today Pt wants to get better

## 2018-11-11 NOTE — PROGRESS NOTE ADULT - SUBJECTIVE AND OBJECTIVE BOX
Patient is a 83y old  Male who presents with a chief complaint of respiratory distress, SOB, chest pain (11 Nov 2018 13:26)      SUBJECTIVE / OVERNIGHT EVENTS: Patient sitting in chair, not in distress. Patient reports suprapubic discomfort, other wise no new complaints.   Tele no events.   ROS other wise negative.     T(C): 36.6 (11-11-18 @ 11:17), Max: 36.6 (11-11-18 @ 11:17)  HR: 71 (11-11-18 @ 17:05) (58 - 71)  BP: 158/69 (11-11-18 @ 17:05) (157/58 - 173/61)  RR: 17 (11-11-18 @ 11:17) (17 - 17)  SpO2: 98% (11-11-18 @ 11:17) (95% - 98%)    MEDICATIONS  (STANDING):  aspirin enteric coated 81 milliGRAM(s) Oral daily  buDESOnide 160 MICROgram(s)/formoterol 4.5 MICROgram(s) Inhaler 2 Puff(s) Inhalation two times a day  clopidogrel Tablet 75 milliGRAM(s) Oral daily  dextrose 5%. 1000 milliLiter(s) (50 mL/Hr) IV Continuous <Continuous>  dextrose 50% Injectable 12.5 Gram(s) IV Push once  docusate sodium 100 milliGRAM(s) Oral three times a day  doxazosin 2 milliGRAM(s) Oral at bedtime  furosemide   Injectable 60 milliGRAM(s) IV Push two times a day  heparin  Injectable 5000 Unit(s) SubCutaneous every 8 hours  influenza   Vaccine 0.5 milliLiter(s) IntraMuscular once  insulin lispro (HumaLOG) corrective regimen sliding scale   SubCutaneous three times a day before meals  insulin lispro (HumaLOG) corrective regimen sliding scale   SubCutaneous at bedtime  metoprolol tartrate 50 milliGRAM(s) Oral two times a day  NIFEdipine XL 90 milliGRAM(s) Oral daily  pantoprazole    Tablet 40 milliGRAM(s) Oral before breakfast  senna 2 Tablet(s) Oral at bedtime  sertraline 200 milliGRAM(s) Oral daily  simvastatin 20 milliGRAM(s) Oral at bedtime    MEDICATIONS  (PRN):  dextrose 40% Gel 15 Gram(s) Oral once PRN Blood Glucose LESS THAN 70 milliGRAM(s)/deciliter  hydrALAZINE 100 milliGRAM(s) Oral every 8 hours PRN Systolic blood pressure > 170  oxyCODONE    5 mG/acetaminophen 325 mG 1 Tablet(s) Oral every 6 hours PRN Moderate Pain (4 - 6)    CAPILLARY BLOOD GLUCOSE      CAPILLARY BLOOD GLUCOSE      POCT Blood Glucose.: 155 mg/dL (11 Nov 2018 16:38)  POCT Blood Glucose.: 144 mg/dL (11 Nov 2018 11:43)  POCT Blood Glucose.: 134 mg/dL (11 Nov 2018 07:56)  POCT Blood Glucose.: 120 mg/dL (10 Nov 2018 21:40)      PHYSICAL EXAM:  GENERAL: NAD, well-developed  HEAD:  Atraumatic, Normocephalic  EYES: EOMI, conjunctiva and sclera clear  NECK: Supple, No JVD  CHEST/LUNG: Clear to auscultation bilaterally; No wheeze  HEART: Regular rate and rhythm; No murmurs, rubs, or gallops  ABDOMEN: Soft, Nontender, Nondistended; Bowel sounds present  EXTREMITIES:  2+ Peripheral Pulses, No clubbing, cyanosis, or edema  PSYCH: AAOx3  NEUROLOGY: non-focal  SKIN: No rashes or lesions                            9.5    3.55  )-----------( 104      ( 11 Nov 2018 08:17 )             29.1       CARDIAC MARKERS ( 10 Nov 2018 05:51 )  x     / x     / 27 U/L / x     / 2.1 ng/mL      LIVER FUNCTIONS - ( 10 Nov 2018 05:51 )  Alb: 4.0 g/dL / Pro: 7.0 g/dL / ALK PHOS: 59 U/L / ALT: 7 U/L / AST: 11 U/L / GGT: x           PT/INR - ( 10 Nov 2018 05:51 )   PT: 12.8 sec;   INR: 1.12 ratio         PTT - ( 10 Nov 2018 05:51 )  PTT:25.3 sec  145|105|44<137  2.9|27|2.47  9.1,--,--  11-11 @ 05:28        RADIOLOGY & ADDITIONAL TESTS:    Imaging Personally Reviewed:    Consultant(s) Notes Reviewed:      Care Discussed with Consultants/Other Providers:

## 2018-11-11 NOTE — PHYSICAL THERAPY INITIAL EVALUATION ADULT - CRITERIA FOR SKILLED THERAPEUTIC INTERVENTIONS
impairments found/predicted duration of therapy intervention/risk reduction/prevention/anticipated discharge recommendation/anticipated equipment needs at discharge

## 2018-11-11 NOTE — PROGRESS NOTE ADULT - PROBLEM SELECTOR PLAN 2
- systolic murmur on exam  - tele  - TTE  - CE  - lasix 40 mg BID for now.   - strict I&O  - cont metoprolol, nifedpine

## 2018-11-11 NOTE — CONSULT NOTE ADULT - ASSESSMENT
83 white male with a history of HTN, CAD, CHF, DM. PVD, HLD, CKD with anemia now admitted with a history of SOB, FLOREZ and leg swelling. He is now diuresing well with IV lasix. Hypokalemic metabolic alkalosis is due to diuretics. Will supplement potassium as ordered. Will also obtain a renal sonogram to assess the kidney size. Spoke to the entire family at bed side. Avoid nephrotoxics including IV contrast and NSAIDS at this time.

## 2018-11-11 NOTE — PHYSICAL THERAPY INITIAL EVALUATION ADULT - DISCHARGE DISPOSITION, PT EVAL
To improve functional mobility, balance, strength and endurance. Pt's family will assist with ADL's./home w/ home PT/home w/ assist

## 2018-11-11 NOTE — PROGRESS NOTE ADULT - PROBLEM SELECTOR PLAN 8
- pt's bmp glc is 120s.  - pt states he has been giving himself variable lantus, but usually around 7U a night.

## 2018-11-11 NOTE — PROGRESS NOTE ADULT - PROBLEM SELECTOR PLAN 1
- 2/2 pulm edema  - cont diuresis as below  - doing well on NC Oxygen, off Bipap.   - titrate down Oxygen am tomorrow.   - CT chest show b/l pleural effusions, pulmonary edema.   - hold off antibiotics, as pt does not endorse s/s active infection/URI/PNA symptoms

## 2018-11-11 NOTE — PROGRESS NOTE ADULT - PROBLEM SELECTOR PLAN 4
TOV today, continue with diuresis, patients renal consulted.   - urine lytes  - renal u/s. also to eval right flank pain.  - percocet PRN for chronic back pain

## 2018-11-11 NOTE — PHYSICAL THERAPY INITIAL EVALUATION ADULT - PERTINENT HX OF CURRENT PROBLEM, REHAB EVAL
83M, trilingual [place]i/[place] speaking, c hx CAD s/p 6 stents (last stent 6 years ago), CHF, CKD (unknown baseline), DM2, HTN, HLD, chronic R lower back pain and chronic suprapubic pain, former smoker, p/w hypoxic resp failure 2/2 pulm edema likely 2/2 CHF exacerbation and poss RAMÍREZ.

## 2018-11-11 NOTE — PROGRESS NOTE ADULT - ASSESSMENT
83M, trilingual South Korean/American speaking, c hx CAD s/p 6 stents (last stent 6 years ago), CHF, CKD (unknown baseline), DM2, HTN, HLD, chronic R lower back pain and chronic suprapubic pain, former smoker, pw hypoxic resp failure 2/2 pulm edema likely 2/2 CHF exacerbation and poss RAMÍREZ.

## 2018-11-12 ENCOUNTER — TRANSCRIPTION ENCOUNTER (OUTPATIENT)
Age: 83
End: 2018-11-12

## 2018-11-12 LAB
ANION GAP SERPL CALC-SCNC: 16 MMOL/L — SIGNIFICANT CHANGE UP (ref 5–17)
BUN SERPL-MCNC: 51 MG/DL — HIGH (ref 7–23)
CALCIUM SERPL-MCNC: 9 MG/DL — SIGNIFICANT CHANGE UP (ref 8.4–10.5)
CHLORIDE SERPL-SCNC: 102 MMOL/L — SIGNIFICANT CHANGE UP (ref 96–108)
CO2 SERPL-SCNC: 26 MMOL/L — SIGNIFICANT CHANGE UP (ref 22–31)
CREAT SERPL-MCNC: 2.5 MG/DL — HIGH (ref 0.5–1.3)
GLUCOSE BLDC GLUCOMTR-MCNC: 120 MG/DL — HIGH (ref 70–99)
GLUCOSE BLDC GLUCOMTR-MCNC: 145 MG/DL — HIGH (ref 70–99)
GLUCOSE SERPL-MCNC: 139 MG/DL — HIGH (ref 70–99)
HCT VFR BLD CALC: 29.7 % — LOW (ref 39–50)
HGB BLD-MCNC: 9.3 G/DL — LOW (ref 13–17)
MCHC RBC-ENTMCNC: 29.5 PG — SIGNIFICANT CHANGE UP (ref 27–34)
MCHC RBC-ENTMCNC: 31.3 GM/DL — LOW (ref 32–36)
MCV RBC AUTO: 94.3 FL — SIGNIFICANT CHANGE UP (ref 80–100)
PLATELET # BLD AUTO: 117 K/UL — LOW (ref 150–400)
POTASSIUM SERPL-MCNC: 3.2 MMOL/L — LOW (ref 3.5–5.3)
POTASSIUM SERPL-SCNC: 3.2 MMOL/L — LOW (ref 3.5–5.3)
RBC # BLD: 3.15 M/UL — LOW (ref 4.2–5.8)
RBC # FLD: 15.5 % — HIGH (ref 10.3–14.5)
SODIUM SERPL-SCNC: 144 MMOL/L — SIGNIFICANT CHANGE UP (ref 135–145)
WBC # BLD: 3.56 K/UL — LOW (ref 3.8–10.5)
WBC # FLD AUTO: 3.56 K/UL — LOW (ref 3.8–10.5)

## 2018-11-12 PROCEDURE — 99233 SBSQ HOSP IP/OBS HIGH 50: CPT

## 2018-11-12 PROCEDURE — 93306 TTE W/DOPPLER COMPLETE: CPT | Mod: 26

## 2018-11-12 PROCEDURE — 93970 EXTREMITY STUDY: CPT | Mod: 26

## 2018-11-12 PROCEDURE — 76775 US EXAM ABDO BACK WALL LIM: CPT | Mod: 26

## 2018-11-12 RX ORDER — POTASSIUM CHLORIDE 20 MEQ
40 PACKET (EA) ORAL EVERY 4 HOURS
Qty: 0 | Refills: 0 | Status: COMPLETED | OUTPATIENT
Start: 2018-11-12 | End: 2018-11-12

## 2018-11-12 RX ORDER — FUROSEMIDE 40 MG
40 TABLET ORAL
Qty: 0 | Refills: 0 | Status: DISCONTINUED | OUTPATIENT
Start: 2018-11-12 | End: 2018-11-18

## 2018-11-12 RX ADMIN — HEPARIN SODIUM 5000 UNIT(S): 5000 INJECTION INTRAVENOUS; SUBCUTANEOUS at 22:30

## 2018-11-12 RX ADMIN — OXYCODONE AND ACETAMINOPHEN 1 TABLET(S): 5; 325 TABLET ORAL at 07:47

## 2018-11-12 RX ADMIN — Medication 40 MILLIEQUIVALENT(S): at 08:55

## 2018-11-12 RX ADMIN — Medication 100 MILLIGRAM(S): at 13:46

## 2018-11-12 RX ADMIN — SIMVASTATIN 20 MILLIGRAM(S): 20 TABLET, FILM COATED ORAL at 22:29

## 2018-11-12 RX ADMIN — SENNA PLUS 2 TABLET(S): 8.6 TABLET ORAL at 22:29

## 2018-11-12 RX ADMIN — BUDESONIDE AND FORMOTEROL FUMARATE DIHYDRATE 2 PUFF(S): 160; 4.5 AEROSOL RESPIRATORY (INHALATION) at 05:16

## 2018-11-12 RX ADMIN — BUDESONIDE AND FORMOTEROL FUMARATE DIHYDRATE 2 PUFF(S): 160; 4.5 AEROSOL RESPIRATORY (INHALATION) at 17:07

## 2018-11-12 RX ADMIN — Medication 100 MILLIGRAM(S): at 22:29

## 2018-11-12 RX ADMIN — OXYCODONE AND ACETAMINOPHEN 1 TABLET(S): 5; 325 TABLET ORAL at 17:57

## 2018-11-12 RX ADMIN — Medication 4: at 12:12

## 2018-11-12 RX ADMIN — OXYCODONE AND ACETAMINOPHEN 1 TABLET(S): 5; 325 TABLET ORAL at 08:30

## 2018-11-12 RX ADMIN — OXYCODONE AND ACETAMINOPHEN 1 TABLET(S): 5; 325 TABLET ORAL at 19:24

## 2018-11-12 RX ADMIN — Medication 100 MILLIGRAM(S): at 04:16

## 2018-11-12 RX ADMIN — Medication 81 MILLIGRAM(S): at 12:12

## 2018-11-12 RX ADMIN — HEPARIN SODIUM 5000 UNIT(S): 5000 INJECTION INTRAVENOUS; SUBCUTANEOUS at 13:48

## 2018-11-12 RX ADMIN — PANTOPRAZOLE SODIUM 40 MILLIGRAM(S): 20 TABLET, DELAYED RELEASE ORAL at 06:04

## 2018-11-12 RX ADMIN — Medication 60 MILLIGRAM(S): at 05:50

## 2018-11-12 RX ADMIN — Medication 50 MILLIGRAM(S): at 05:16

## 2018-11-12 RX ADMIN — Medication 50 MILLIGRAM(S): at 17:07

## 2018-11-12 RX ADMIN — Medication 2 MILLIGRAM(S): at 22:29

## 2018-11-12 RX ADMIN — SERTRALINE 200 MILLIGRAM(S): 25 TABLET, FILM COATED ORAL at 12:13

## 2018-11-12 RX ADMIN — Medication 90 MILLIGRAM(S): at 05:16

## 2018-11-12 RX ADMIN — Medication 40 MILLIGRAM(S): at 17:07

## 2018-11-12 RX ADMIN — Medication 40 MILLIEQUIVALENT(S): at 12:12

## 2018-11-12 RX ADMIN — Medication 100 MILLIGRAM(S): at 05:51

## 2018-11-12 RX ADMIN — HEPARIN SODIUM 5000 UNIT(S): 5000 INJECTION INTRAVENOUS; SUBCUTANEOUS at 05:16

## 2018-11-12 RX ADMIN — CLOPIDOGREL BISULFATE 75 MILLIGRAM(S): 75 TABLET, FILM COATED ORAL at 12:12

## 2018-11-12 NOTE — DISCHARGE NOTE ADULT - MEDICATION SUMMARY - MEDICATIONS TO STOP TAKING
I will STOP taking the medications listed below when I get home from the hospital:    GlipiZIDE XL 5 mg oral tablet, extended release  -- 1 tab(s) by mouth once a day I will STOP taking the medications listed below when I get home from the hospital:    GlipiZIDE XL 5 mg oral tablet, extended release  -- 1 tab(s) by mouth once a day    potassium chloride 20 mEq oral tablet, extended release  -- 1 tab(s) by mouth 3 times a day    Lasix 80 mg oral tablet  -- 1 tab(s) by mouth once a day

## 2018-11-12 NOTE — PROGRESS NOTE ADULT - PROBLEM SELECTOR PLAN 1
Would change lasix to 40mg PO BID now given lack of clinical findings of CHF.   LE without any edema and minimal lung findings. Awaiting echo eval.   Suspect decompensation due to noncompliance with diuretic per patient.   - strict I&O monitor renal function.   - cont metoprolol, nifedpine.

## 2018-11-12 NOTE — PROGRESS NOTE ADULT - PROBLEM SELECTOR PLAN 2
- 2/2 pulm edema  - cont diuresis as below  - trial and monitor off oxygen, off Bipap.   - CT chest show b/l pleural effusions, pulmonary edema.   - hold off antibiotics, as pt does not endorse s/s active infection/URI/PNA symptoms

## 2018-11-12 NOTE — DISCHARGE NOTE ADULT - MEDICATION SUMMARY - MEDICATIONS TO CHANGE
I will SWITCH the dose or number of times a day I take the medications listed below when I get home from the hospital:    Lantus 100 units/mL subcutaneous solution  -- 8 unit(s) subcutaneous once a day (at bedtime)

## 2018-11-12 NOTE — DISCHARGE NOTE ADULT - CARE PROVIDERS DIRECT ADDRESSES
,DirectAddress_Unknown,DirectAddress_Unknown,DirectAddress_Unknown,[email or web address]

## 2018-11-12 NOTE — PROGRESS NOTE ADULT - ASSESSMENT
83M, trilingual Turkmen/Honduran speaking, c hx CAD s/p 6 stents (last stent 6 years ago), CHF, CKD (unknown baseline), DM2, HTN, HLD, chronic R lower back pain and chronic suprapubic pain, former smoker, pw hypoxic resp failure 2/2 pulm edema likely 2/2 CHF exacerbation and poss RAMÍREZ.

## 2018-11-12 NOTE — PROGRESS NOTE ADULT - SUBJECTIVE AND OBJECTIVE BOX
NEPHROLOGY PROGRESS NOTE    No distress    Vital Signs Last 24 Hrs  T(C): 37 (11-12-18 @ 03:58), Max: 37 (11-12-18 @ 03:58)  T(F): 98.6 (11-12-18 @ 03:58), Max: 98.6 (11-12-18 @ 03:58)  HR: 70 (11-12-18 @ 14:12) (65 - 71)  BP: 148/57 (11-12-18 @ 06:28) (148/57 - 176/74)  RR: 18 (11-12-18 @ 06:28) (18 - 18)  SpO2: 95% (11-12-18 @ 14:12) (95% - 98%)    Gen -Conversant, in no apparent distress  Lungs - lungs w/fair air entry bilaterally  Heart - S1S2  Abd - soft, NT,  ND, + BS  Extr - no edema         LABS                             9.3    3.56  )-----------( 117      ( 12 Nov 2018 07:45 )             29.7          11-12    144  |  102  |  51<H>  ----------------------------<  139<H>  3.2<L>   |  26  |  2.50<H>    Ca    9.0      12 Nov 2018 05:35    aspirin enteric coated 81 milliGRAM(s) Oral daily  buDESOnide 160 MICROgram(s)/formoterol 4.5 MICROgram(s) Inhaler 2 Puff(s) Inhalation two times a day  clopidogrel Tablet 75 milliGRAM(s) Oral daily  dextrose 40% Gel 15 Gram(s) Oral once PRN  dextrose 5%. 1000 milliLiter(s) IV Continuous <Continuous>  dextrose 50% Injectable 12.5 Gram(s) IV Push once  docusate sodium 100 milliGRAM(s) Oral three times a day  doxazosin 2 milliGRAM(s) Oral at bedtime  furosemide    Tablet 40 milliGRAM(s) Oral two times a day  heparin  Injectable 5000 Unit(s) SubCutaneous every 8 hours  hydrALAZINE 100 milliGRAM(s) Oral every 8 hours PRN  influenza   Vaccine 0.5 milliLiter(s) IntraMuscular once  insulin lispro (HumaLOG) corrective regimen sliding scale   SubCutaneous three times a day before meals  insulin lispro (HumaLOG) corrective regimen sliding scale   SubCutaneous at bedtime  metoprolol tartrate 50 milliGRAM(s) Oral two times a day  NIFEdipine XL 90 milliGRAM(s) Oral daily  oxyCODONE    5 mG/acetaminophen 325 mG 1 Tablet(s) Oral every 6 hours PRN  pantoprazole    Tablet 40 milliGRAM(s) Oral before breakfast  senna 2 Tablet(s) Oral at bedtime  sertraline 200 milliGRAM(s) Oral daily  simvastatin 20 milliGRAM(s) Oral at bedtime    A/P:    CM, CHF  CKD, baseline Cr unknown  Rising Cr on IV diuretics noted  Agree w/PO Lasix  Avoid ACE, ARB  SONO w/o hydro, UA negative  BMP in am  Suppl K, check Mg level

## 2018-11-12 NOTE — DISCHARGE NOTE ADULT - HOME CARE AGENCY
[facility]  ([phone]).  RN visit to assess for skilled Nursing needs. To start the day after discharge.

## 2018-11-12 NOTE — DISCHARGE NOTE ADULT - ADDITIONAL INSTRUCTIONS
Follow up with Dr. Allan Monreal (PMD [id number]),   Follow up with Dr. Varun Zapata (Card [id number]),   Follow up with Dr. Chang Matthews (Renal [id number]),   Follow up with Dr. Anthony Arellano (pain [id number]) Follow up with Dr. Allan Monreal (PMD [id number]),   Follow up with Dr. Varun Zapata (Card [id number]), within 1 week to discuss resuming diuretics (on hold after cardiac cath)  Follow up with Dr. Chang Matthews (Renal [id number]) within 1 week to discuss resuming diuretics (on hold after cardiac cath)  Follow up with Dr. Anthony Arellano (pain [id number]) Follow up with Dr. Allan Monreal (PMD [id number]),   Follow up with Dr. Varun Zapata (Card [id number]), within 1 week to discuss resuming diuretics (on hold after cardiac cath)  Follow up with Dr. Chang Matthews (Renal [id number]) within 1 week to discuss resuming diuretics (on hold after cardiac cath)  Follow up with Dr. Anthony Arellano (pain [id number])  Follow up with Nephrologist - Dr. Arreola in 1 week - call for appointment - card given to son  Please follow up as outpatient [facility] nephrology for resuming lasix in 1 week - Lasix now on hold due to reduced kidney function

## 2018-11-12 NOTE — DISCHARGE NOTE ADULT - PLAN OF CARE
Cont insulin and monitor your blood sugar   Follow up with PMD/Endo as outpt. Cont with BP meds as directed  Follow up with PMD/card for BP check COnt with steroid as directed. Improved symptoms and prevention Cont all your meds as directed.   Monitor your weight daily and notify your doctor if any significant weight gain.   Follow up with Varun Stanley in 1 week. You were found to have valve issues on imaging.   Please follow up with Dr. Ramirez as outpt for eventually valve surgery  call for appointment  Cont all your meds as tolerated Please follow up with your nephrologist as outpt in 1-2 weeks  Cont to take all your meds as directed. Cont all your meds as directed.   Monitor your weight daily and notify your doctor if any significant weight gain.   Follow up with Varun Stanley in 1 week re: resuming diuretics (held after cardiac cath) Please follow up with your nephrologist as outpt WITHIN 1 week to discuss resuming diuretics/Lasix.  Cont to take all your meds as directed.

## 2018-11-12 NOTE — DISCHARGE NOTE ADULT - HOSPITAL COURSE
83M, c hx CAD s/p 6 stents (last stent 6 years ago), CHF, CKD (unknown baseline), DM2, HTN, HLD, chronic R lower back pain and chronic suprapubic pain, former smoker, presents with 3 days of increasing SOB, leg swelling, and chest pain. Patient was admitted to [facility] for monitor and started IV diuresis with symptomatic improvement. On echo noted with tethered MV leaflet and MARBIN was performed to confirm. Patient was evaluated by structural heart team and recommended outpt follow up for eventual MV clip. Cardiac cath was performed to eval for ischemia without evidence of ischemic disease. Patient was also evaluated by renal and optimized for cardiac cath. renal function remain stable at the time of d/c.    Of note, hospital course was complicated by fever and right ankle pain likely due to acute gouty flare. Patient was seen by ID and rheum and recommended for steroid taper. Clinically much improved 83M, c hx CAD s/p 6 stents (last stent 6 years ago), CHF, CKD (unknown baseline), DM2, HTN, HLD, chronic R lower back pain and chronic suprapubic pain, former smoker, presents with 3 days of increasing SOB, leg swelling, and chest pain. Patient was admitted to [facility] for monitor and started IV diuresis with symptomatic improvement. On echo noted with tethered MV leaflet and MARBIN was performed to confirm. Patient was evaluated by structural heart team and recommended outpt follow up for eventual MV clip. Cardiac cath was performed to eval for ischemia without evidence of ischemic disease. Patient was also evaluated by renal and optimized for cardiac cath. renal function remain stable at the time of d/c.    Of note, hospital course was complicated by fever and right ankle pain likely due to acute gouty flare. Patient was seen by ID and rheum and recommended for steroid taper. Clinically much improved  Please follow up as outpatient for resuming lasix in 1 week - Lasix now on hold due to reduced kidney function

## 2018-11-12 NOTE — PROGRESS NOTE ADULT - PROBLEM SELECTOR PLAN 8
- pt requests full code  - pt has 4 children, but wants his daughter Mary Perez to make decisions for him if pt is unable to.  - pt requests full medical management

## 2018-11-12 NOTE — DISCHARGE NOTE ADULT - PATIENT PORTAL LINK FT
You can access the [facility] Patient Portal, offered by [facility], by registering with the following website: [email or web address]

## 2018-11-12 NOTE — PROGRESS NOTE ADULT - SUBJECTIVE AND OBJECTIVE BOX
Patient is a 83y old  Male who presents with a chief complaint of respiratory distress, SOB, chest pain (11 Nov 2018 14:57)        SUBJECTIVE / OVERNIGHT EVENTS:  Feels ok. breathing has improved. c/o lower back and hip pain.   afebrile. no cough. No acute events overnight or tele.     MEDICATIONS  (STANDING):  aspirin enteric coated 81 milliGRAM(s) Oral daily  buDESOnide 160 MICROgram(s)/formoterol 4.5 MICROgram(s) Inhaler 2 Puff(s) Inhalation two times a day  clopidogrel Tablet 75 milliGRAM(s) Oral daily  dextrose 5%. 1000 milliLiter(s) (50 mL/Hr) IV Continuous <Continuous>  dextrose 50% Injectable 12.5 Gram(s) IV Push once  docusate sodium 100 milliGRAM(s) Oral three times a day  doxazosin 2 milliGRAM(s) Oral at bedtime  furosemide   Injectable 60 milliGRAM(s) IV Push two times a day  heparin  Injectable 5000 Unit(s) SubCutaneous every 8 hours  influenza   Vaccine 0.5 milliLiter(s) IntraMuscular once  insulin lispro (HumaLOG) corrective regimen sliding scale   SubCutaneous three times a day before meals  insulin lispro (HumaLOG) corrective regimen sliding scale   SubCutaneous at bedtime  metoprolol tartrate 50 milliGRAM(s) Oral two times a day  NIFEdipine XL 90 milliGRAM(s) Oral daily  pantoprazole    Tablet 40 milliGRAM(s) Oral before breakfast  senna 2 Tablet(s) Oral at bedtime  sertraline 200 milliGRAM(s) Oral daily  simvastatin 20 milliGRAM(s) Oral at bedtime    MEDICATIONS  (PRN):  dextrose 40% Gel 15 Gram(s) Oral once PRN Blood Glucose LESS THAN 70 milliGRAM(s)/deciliter  hydrALAZINE 100 milliGRAM(s) Oral every 8 hours PRN Systolic blood pressure > 170  oxyCODONE    5 mG/acetaminophen 325 mG 1 Tablet(s) Oral every 6 hours PRN Moderate Pain (4 - 6)      Vital Signs Last 24 Hrs  T(C): 37 (12 Nov 2018 03:58), Max: 37 (12 Nov 2018 03:58)  T(F): 98.6 (12 Nov 2018 03:58), Max: 98.6 (12 Nov 2018 03:58)  HR: 65 (12 Nov 2018 06:28) (65 - 71)  BP: 148/57 (12 Nov 2018 06:28) (148/57 - 176/74)  BP(mean): --  RR: 18 (12 Nov 2018 06:28) (18 - 18)  SpO2: 98% (12 Nov 2018 06:28) (95% - 98%)  CAPILLARY BLOOD GLUCOSE      POCT Blood Glucose.: 310 mg/dL (12 Nov 2018 12:03)  POCT Blood Glucose.: 127 mg/dL (12 Nov 2018 08:02)  POCT Blood Glucose.: 150 mg/dL (11 Nov 2018 21:46)  POCT Blood Glucose.: 155 mg/dL (11 Nov 2018 16:38)    I&O's Summary    11 Nov 2018 07:01  -  12 Nov 2018 07:00  --------------------------------------------------------  IN: 1200 mL / OUT: 2625 mL / NET: -1425 mL    12 Nov 2018 07:01  -  12 Nov 2018 13:00  --------------------------------------------------------  IN: 0 mL / OUT: 700 mL / NET: -700 mL          PHYSICAL EXAM  GENERAL: NAD, well-developed  HEAD:  Atraumatic, Normocephalic  EYES: EOMI, conjunctiva and sclera clear  NECK: Supple, No JVD  CHEST/LUNG: LLL crackles.; No wheeze  HEART: Regular rate and rhythm; No murmurs, rubs, or gallops  ABDOMEN: Soft, Nontender, Nondistended; Bowel sounds present  EXTREMITIES: No clubbing, cyanosis, or edema  PSYCH: AAOx3  SKIN: No rashes or lesions    LABS:                        9.3    3.56  )-----------( 117      ( 12 Nov 2018 07:45 )             29.7     11-12    144  |  102  |  51<H>  ----------------------------<  139<H>  3.2<L>   |  26  |  2.50<H>    Ca    9.0      12 Nov 2018 05:35                Culture - Urine (collected 10 Nov 2018 03:00)  Source: .Urine Catheterized  Final Report (11 Nov 2018 08:56):    No growth        RADIOLOGY & ADDITIONAL TESTS:    Imaging Personally Reviewed:  Consultant(s) Notes Reviewed:    Care Discussed with Consultants/Other Providers:

## 2018-11-12 NOTE — DISCHARGE NOTE ADULT - CARE PLAN
Principal Discharge DX:	Acute systolic congestive heart failure  Goal:	Improved symptoms and prevention  Assessment and plan of treatment:	Cont all your meds as directed.   Monitor your weight daily and notify your doctor if any significant weight gain.   Follow up with Varun Stanley in 1 week.  Secondary Diagnosis:	Severe mitral valve regurgitation  Assessment and plan of treatment:	You were found to have valve issues on imaging.   Please follow up with Dr. Ramirez as outpt for eventually valve surgery  call for appointment  Cont all your meds as tolerated  Secondary Diagnosis:	Stage 3 chronic kidney disease  Assessment and plan of treatment:	Please follow up with your nephrologist as outpt in 1-2 weeks  Cont to take all your meds as directed.  Secondary Diagnosis:	Type 2 diabetes mellitus with complication, with long-term current use of insulin  Assessment and plan of treatment:	Cont insulin and monitor your blood sugar   Follow up with PMD/Endo as outpt.  Secondary Diagnosis:	Essential hypertension  Assessment and plan of treatment:	Cont with BP meds as directed  Follow up with PMD/card for BP check  Secondary Diagnosis:	Acute gout of right ankle, unspecified cause  Assessment and plan of treatment:	COnt with steroid as directed. Principal Discharge DX:	Acute systolic congestive heart failure  Goal:	Improved symptoms and prevention  Assessment and plan of treatment:	Cont all your meds as directed.   Monitor your weight daily and notify your doctor if any significant weight gain.   Follow up with Varun Stanley in 1 week re: resuming diuretics (held after cardiac cath)  Secondary Diagnosis:	Severe mitral valve regurgitation  Assessment and plan of treatment:	You were found to have valve issues on imaging.   Please follow up with Dr. Ramirez as outpt for eventually valve surgery  call for appointment  Cont all your meds as tolerated  Secondary Diagnosis:	Stage 3 chronic kidney disease  Assessment and plan of treatment:	Please follow up with your nephrologist as outpt WITHIN 1 week to discuss resuming diuretics/Lasix.  Cont to take all your meds as directed.  Secondary Diagnosis:	Type 2 diabetes mellitus with complication, with long-term current use of insulin  Assessment and plan of treatment:	Cont insulin and monitor your blood sugar   Follow up with PMD/Endo as outpt.  Secondary Diagnosis:	Essential hypertension  Assessment and plan of treatment:	Cont with BP meds as directed  Follow up with PMD/card for BP check  Secondary Diagnosis:	Acute gout of right ankle, unspecified cause  Assessment and plan of treatment:	COnt with steroid as directed.

## 2018-11-12 NOTE — DISCHARGE NOTE ADULT - CARE PROVIDER_API CALL
Chang Matthews), Internal Medicine; Nephrology  [address] [facility] [address] [facility]  [address]  [place] [address]  Phone: [phone]  Fax: [phone]    roddy chao  Please call for appointment in 1 week for f/u  Phone: (   )    -  Fax: (   )    -    Allan Monreal  Please call for f/u in 1-2weeks  Phone: (   )    -  Fax: (   )    -    Kareem Ramirez), Cardiovascular Disease  [address] [place] [address]  Phone: [phone]  Fax: [phone]

## 2018-11-13 DIAGNOSIS — M79.671 PAIN IN RIGHT FOOT: ICD-10-CM

## 2018-11-13 LAB
ALBUMIN SERPL ELPH-MCNC: 3.9 G/DL — SIGNIFICANT CHANGE UP (ref 3.3–5)
ALP SERPL-CCNC: 50 U/L — SIGNIFICANT CHANGE UP (ref 40–120)
ALT FLD-CCNC: 5 U/L — LOW (ref 10–45)
AMYLASE P1 CFR SERPL: 38 U/L — SIGNIFICANT CHANGE UP (ref 25–125)
ANION GAP SERPL CALC-SCNC: 12 MMOL/L — SIGNIFICANT CHANGE UP (ref 5–17)
ANION GAP SERPL CALC-SCNC: 16 MMOL/L — SIGNIFICANT CHANGE UP (ref 5–17)
AST SERPL-CCNC: 10 U/L — SIGNIFICANT CHANGE UP (ref 10–40)
BILIRUB SERPL-MCNC: 0.5 MG/DL — SIGNIFICANT CHANGE UP (ref 0.2–1.2)
BUN SERPL-MCNC: 51 MG/DL — HIGH (ref 7–23)
BUN SERPL-MCNC: 54 MG/DL — HIGH (ref 7–23)
CALCIUM SERPL-MCNC: 9.4 MG/DL — SIGNIFICANT CHANGE UP (ref 8.4–10.5)
CALCIUM SERPL-MCNC: 9.5 MG/DL — SIGNIFICANT CHANGE UP (ref 8.4–10.5)
CHLORIDE SERPL-SCNC: 100 MMOL/L — SIGNIFICANT CHANGE UP (ref 96–108)
CHLORIDE SERPL-SCNC: 103 MMOL/L — SIGNIFICANT CHANGE UP (ref 96–108)
CO2 SERPL-SCNC: 26 MMOL/L — SIGNIFICANT CHANGE UP (ref 22–31)
CO2 SERPL-SCNC: 29 MMOL/L — SIGNIFICANT CHANGE UP (ref 22–31)
CREAT SERPL-MCNC: 2.54 MG/DL — HIGH (ref 0.5–1.3)
CREAT SERPL-MCNC: 2.54 MG/DL — HIGH (ref 0.5–1.3)
GLUCOSE BLDC GLUCOMTR-MCNC: 149 MG/DL — HIGH (ref 70–99)
GLUCOSE BLDC GLUCOMTR-MCNC: 166 MG/DL — HIGH (ref 70–99)
GLUCOSE BLDC GLUCOMTR-MCNC: 166 MG/DL — HIGH (ref 70–99)
GLUCOSE BLDC GLUCOMTR-MCNC: 185 MG/DL — HIGH (ref 70–99)
GLUCOSE SERPL-MCNC: 137 MG/DL — HIGH (ref 70–99)
GLUCOSE SERPL-MCNC: 153 MG/DL — HIGH (ref 70–99)
HCT VFR BLD CALC: 29.3 % — LOW (ref 39–50)
HGB BLD-MCNC: 9.5 G/DL — LOW (ref 13–17)
LIDOCAIN IGE QN: 18 U/L — SIGNIFICANT CHANGE UP (ref 7–60)
MAGNESIUM SERPL-MCNC: 2.3 MG/DL — SIGNIFICANT CHANGE UP (ref 1.6–2.6)
MCHC RBC-ENTMCNC: 29.9 PG — SIGNIFICANT CHANGE UP (ref 27–34)
MCHC RBC-ENTMCNC: 32.4 GM/DL — SIGNIFICANT CHANGE UP (ref 32–36)
MCV RBC AUTO: 92.1 FL — SIGNIFICANT CHANGE UP (ref 80–100)
PHOSPHATE SERPL-MCNC: 4.4 MG/DL — SIGNIFICANT CHANGE UP (ref 2.5–4.5)
PLATELET # BLD AUTO: 113 K/UL — LOW (ref 150–400)
POTASSIUM SERPL-MCNC: 3.5 MMOL/L — SIGNIFICANT CHANGE UP (ref 3.5–5.3)
POTASSIUM SERPL-MCNC: 3.7 MMOL/L — SIGNIFICANT CHANGE UP (ref 3.5–5.3)
POTASSIUM SERPL-SCNC: 3.5 MMOL/L — SIGNIFICANT CHANGE UP (ref 3.5–5.3)
POTASSIUM SERPL-SCNC: 3.7 MMOL/L — SIGNIFICANT CHANGE UP (ref 3.5–5.3)
PROT SERPL-MCNC: 7 G/DL — SIGNIFICANT CHANGE UP (ref 6–8.3)
RBC # BLD: 3.18 M/UL — LOW (ref 4.2–5.8)
RBC # FLD: 15.5 % — HIGH (ref 10.3–14.5)
SODIUM SERPL-SCNC: 141 MMOL/L — SIGNIFICANT CHANGE UP (ref 135–145)
SODIUM SERPL-SCNC: 145 MMOL/L — SIGNIFICANT CHANGE UP (ref 135–145)
WBC # BLD: 4.37 K/UL — SIGNIFICANT CHANGE UP (ref 3.8–10.5)
WBC # FLD AUTO: 4.37 K/UL — SIGNIFICANT CHANGE UP (ref 3.8–10.5)

## 2018-11-13 PROCEDURE — 99233 SBSQ HOSP IP/OBS HIGH 50: CPT

## 2018-11-13 PROCEDURE — 73610 X-RAY EXAM OF ANKLE: CPT | Mod: 26,RT

## 2018-11-13 PROCEDURE — 74018 RADEX ABDOMEN 1 VIEW: CPT | Mod: 26

## 2018-11-13 RX ORDER — COLCHICINE 0.6 MG
1.2 TABLET ORAL ONCE
Qty: 0 | Refills: 0 | Status: DISCONTINUED | OUTPATIENT
Start: 2018-11-13 | End: 2018-11-13

## 2018-11-13 RX ORDER — ACETAMINOPHEN 500 MG
650 TABLET ORAL ONCE
Qty: 0 | Refills: 0 | Status: COMPLETED | OUTPATIENT
Start: 2018-11-13 | End: 2018-11-13

## 2018-11-13 RX ORDER — OXYCODONE HYDROCHLORIDE 5 MG/1
5 TABLET ORAL EVERY 4 HOURS
Qty: 0 | Refills: 0 | Status: DISCONTINUED | OUTPATIENT
Start: 2018-11-13 | End: 2018-11-17

## 2018-11-13 RX ORDER — ACETAMINOPHEN 500 MG
1000 TABLET ORAL ONCE
Qty: 0 | Refills: 0 | Status: COMPLETED | OUTPATIENT
Start: 2018-11-13 | End: 2018-11-13

## 2018-11-13 RX ADMIN — OXYCODONE HYDROCHLORIDE 5 MILLIGRAM(S): 5 TABLET ORAL at 18:23

## 2018-11-13 RX ADMIN — Medication 650 MILLIGRAM(S): at 21:27

## 2018-11-13 RX ADMIN — BUDESONIDE AND FORMOTEROL FUMARATE DIHYDRATE 2 PUFF(S): 160; 4.5 AEROSOL RESPIRATORY (INHALATION) at 18:22

## 2018-11-13 RX ADMIN — Medication 40 MILLIGRAM(S): at 06:28

## 2018-11-13 RX ADMIN — Medication 100 MILLIGRAM(S): at 13:55

## 2018-11-13 RX ADMIN — SERTRALINE 200 MILLIGRAM(S): 25 TABLET, FILM COATED ORAL at 11:30

## 2018-11-13 RX ADMIN — Medication 50 MILLIGRAM(S): at 06:28

## 2018-11-13 RX ADMIN — OXYCODONE HYDROCHLORIDE 5 MILLIGRAM(S): 5 TABLET ORAL at 11:25

## 2018-11-13 RX ADMIN — Medication 650 MILLIGRAM(S): at 22:00

## 2018-11-13 RX ADMIN — Medication 100 MILLIGRAM(S): at 21:28

## 2018-11-13 RX ADMIN — SENNA PLUS 2 TABLET(S): 8.6 TABLET ORAL at 21:29

## 2018-11-13 RX ADMIN — Medication 100 MILLIGRAM(S): at 22:56

## 2018-11-13 RX ADMIN — Medication 90 MILLIGRAM(S): at 06:28

## 2018-11-13 RX ADMIN — Medication 2 MILLIGRAM(S): at 21:27

## 2018-11-13 RX ADMIN — CLOPIDOGREL BISULFATE 75 MILLIGRAM(S): 75 TABLET, FILM COATED ORAL at 11:25

## 2018-11-13 RX ADMIN — Medication 1000 MILLIGRAM(S): at 02:20

## 2018-11-13 RX ADMIN — OXYCODONE HYDROCHLORIDE 5 MILLIGRAM(S): 5 TABLET ORAL at 12:40

## 2018-11-13 RX ADMIN — Medication 81 MILLIGRAM(S): at 11:26

## 2018-11-13 RX ADMIN — Medication 50 MILLIGRAM(S): at 18:23

## 2018-11-13 RX ADMIN — Medication 40 MILLIGRAM(S): at 18:23

## 2018-11-13 RX ADMIN — OXYCODONE HYDROCHLORIDE 5 MILLIGRAM(S): 5 TABLET ORAL at 21:28

## 2018-11-13 RX ADMIN — HEPARIN SODIUM 5000 UNIT(S): 5000 INJECTION INTRAVENOUS; SUBCUTANEOUS at 13:55

## 2018-11-13 RX ADMIN — Medication 1: at 12:31

## 2018-11-13 RX ADMIN — Medication 100 MILLIGRAM(S): at 06:29

## 2018-11-13 RX ADMIN — HEPARIN SODIUM 5000 UNIT(S): 5000 INJECTION INTRAVENOUS; SUBCUTANEOUS at 21:28

## 2018-11-13 RX ADMIN — OXYCODONE HYDROCHLORIDE 5 MILLIGRAM(S): 5 TABLET ORAL at 18:52

## 2018-11-13 RX ADMIN — Medication 1: at 16:39

## 2018-11-13 RX ADMIN — PANTOPRAZOLE SODIUM 40 MILLIGRAM(S): 20 TABLET, DELAYED RELEASE ORAL at 06:28

## 2018-11-13 RX ADMIN — HEPARIN SODIUM 5000 UNIT(S): 5000 INJECTION INTRAVENOUS; SUBCUTANEOUS at 06:29

## 2018-11-13 RX ADMIN — BUDESONIDE AND FORMOTEROL FUMARATE DIHYDRATE 2 PUFF(S): 160; 4.5 AEROSOL RESPIRATORY (INHALATION) at 06:29

## 2018-11-13 RX ADMIN — OXYCODONE HYDROCHLORIDE 5 MILLIGRAM(S): 5 TABLET ORAL at 22:28

## 2018-11-13 RX ADMIN — Medication 400 MILLIGRAM(S): at 01:50

## 2018-11-13 RX ADMIN — SIMVASTATIN 20 MILLIGRAM(S): 20 TABLET, FILM COATED ORAL at 21:27

## 2018-11-13 NOTE — PROGRESS NOTE ADULT - PROBLEM SELECTOR PLAN 1
Cont lasix to 40mg PO BID with improved symptoms.   LE without any edema and minimal lung findings.  Echo with severe MR. d/w family who reports h/o leaky valve but mild.   called outpt card office but closed today will try again tomorrow.   Suspect decompensation due to noncompliance with diuretic per patient.   - strict I&O monitor renal function.   - cont metoprolol, nifedpine.  Card felaal called.

## 2018-11-13 NOTE — PROGRESS NOTE ADULT - SUBJECTIVE AND OBJECTIVE BOX
Patient is a 83y old  Male who presents with a chief complaint of respiratory distress, SOB, chest pain (12 Nov 2018 15:17)        SUBJECTIVE / OVERNIGHT EVENTS:  c/o right foot pain. new. no trauma or injury. very sensitive to touch  some dark discoloration    MEDICATIONS  (STANDING):  aspirin enteric coated 81 milliGRAM(s) Oral daily  buDESOnide 160 MICROgram(s)/formoterol 4.5 MICROgram(s) Inhaler 2 Puff(s) Inhalation two times a day  clopidogrel Tablet 75 milliGRAM(s) Oral daily  dextrose 5%. 1000 milliLiter(s) (50 mL/Hr) IV Continuous <Continuous>  dextrose 50% Injectable 12.5 Gram(s) IV Push once  docusate sodium 100 milliGRAM(s) Oral three times a day  doxazosin 2 milliGRAM(s) Oral at bedtime  furosemide    Tablet 40 milliGRAM(s) Oral two times a day  heparin  Injectable 5000 Unit(s) SubCutaneous every 8 hours  influenza   Vaccine 0.5 milliLiter(s) IntraMuscular once  insulin lispro (HumaLOG) corrective regimen sliding scale   SubCutaneous three times a day before meals  insulin lispro (HumaLOG) corrective regimen sliding scale   SubCutaneous at bedtime  metoprolol tartrate 50 milliGRAM(s) Oral two times a day  NIFEdipine XL 90 milliGRAM(s) Oral daily  pantoprazole    Tablet 40 milliGRAM(s) Oral before breakfast  senna 2 Tablet(s) Oral at bedtime  sertraline 200 milliGRAM(s) Oral daily  simvastatin 20 milliGRAM(s) Oral at bedtime    MEDICATIONS  (PRN):  dextrose 40% Gel 15 Gram(s) Oral once PRN Blood Glucose LESS THAN 70 milliGRAM(s)/deciliter  hydrALAZINE 100 milliGRAM(s) Oral every 8 hours PRN Systolic blood pressure > 170  oxyCODONE    IR 5 milliGRAM(s) Oral every 4 hours PRN Severe Pain (7 - 10)      Vital Signs Last 24 Hrs  T(C): 37.1 (13 Nov 2018 14:29), Max: 37.4 (13 Nov 2018 12:35)  T(F): 98.7 (13 Nov 2018 14:29), Max: 99.4 (13 Nov 2018 14:04)  HR: 70 (13 Nov 2018 14:29) (70 - 74)  BP: 163/75 (13 Nov 2018 14:29) (149/58 - 176/72)  BP(mean): --  RR: 16 (13 Nov 2018 14:29) (16 - 20)  SpO2: 94% (13 Nov 2018 14:29) (93% - 97%)  CAPILLARY BLOOD GLUCOSE      POCT Blood Glucose.: 166 mg/dL (13 Nov 2018 12:20)  POCT Blood Glucose.: 149 mg/dL (13 Nov 2018 08:14)  POCT Blood Glucose.: 145 mg/dL (12 Nov 2018 21:57)  POCT Blood Glucose.: 120 mg/dL (12 Nov 2018 16:35)    I&O's Summary    12 Nov 2018 07:01  -  13 Nov 2018 07:00  --------------------------------------------------------  IN: 600 mL / OUT: 2350 mL / NET: -1750 mL          PHYSICAL EXAM  GENERAL: NAD, well-developed  HEAD:  Atraumatic, Normocephalic  EYES: EOMI, PERRLA, conjunctiva and sclera clear  NECK: Supple, No JVD  CHEST/LUNG: Clear to auscultation bilaterally; No wheeze  HEART: Regular rate and rhythm; No murmurs, rubs, or gallops  ABDOMEN: Soft, Nontender, Nondistended; Bowel sounds present  EXTREMITIES:  2+ Peripheral Pulses, No clubbing, cyanosis, or edema  PSYCH: AAOx3  SKIN: No rashes or lesions    LABS:                        9.5    4.37  )-----------( 113      ( 13 Nov 2018 09:03 )             29.3     11-13    145  |  103  |  54<H>  ----------------------------<  137<H>  3.7   |  26  |  2.54<H>    Ca    9.4      13 Nov 2018 05:10  Phos  4.4     11-13  Mg     2.3     11-13                  RADIOLOGY & ADDITIONAL TESTS:    Imaging Personally Reviewed:  Consultant(s) Notes Reviewed:    Care Discussed with Consultants/Other Providers: Patient is a 83y old  Male who presents with a chief complaint of respiratory distress, SOB, chest pain (12 Nov 2018 15:17)        SUBJECTIVE / OVERNIGHT EVENTS:  c/o right foot pain. new. no trauma or injury. very sensitive to touch  some dark discoloration. no erythema. no redness. no wounds    MEDICATIONS  (STANDING):  aspirin enteric coated 81 milliGRAM(s) Oral daily  buDESOnide 160 MICROgram(s)/formoterol 4.5 MICROgram(s) Inhaler 2 Puff(s) Inhalation two times a day  clopidogrel Tablet 75 milliGRAM(s) Oral daily  dextrose 5%. 1000 milliLiter(s) (50 mL/Hr) IV Continuous <Continuous>  dextrose 50% Injectable 12.5 Gram(s) IV Push once  docusate sodium 100 milliGRAM(s) Oral three times a day  doxazosin 2 milliGRAM(s) Oral at bedtime  furosemide    Tablet 40 milliGRAM(s) Oral two times a day  heparin  Injectable 5000 Unit(s) SubCutaneous every 8 hours  influenza   Vaccine 0.5 milliLiter(s) IntraMuscular once  insulin lispro (HumaLOG) corrective regimen sliding scale   SubCutaneous three times a day before meals  insulin lispro (HumaLOG) corrective regimen sliding scale   SubCutaneous at bedtime  metoprolol tartrate 50 milliGRAM(s) Oral two times a day  NIFEdipine XL 90 milliGRAM(s) Oral daily  pantoprazole    Tablet 40 milliGRAM(s) Oral before breakfast  senna 2 Tablet(s) Oral at bedtime  sertraline 200 milliGRAM(s) Oral daily  simvastatin 20 milliGRAM(s) Oral at bedtime    MEDICATIONS  (PRN):  dextrose 40% Gel 15 Gram(s) Oral once PRN Blood Glucose LESS THAN 70 milliGRAM(s)/deciliter  hydrALAZINE 100 milliGRAM(s) Oral every 8 hours PRN Systolic blood pressure > 170  oxyCODONE    IR 5 milliGRAM(s) Oral every 4 hours PRN Severe Pain (7 - 10)      Vital Signs Last 24 Hrs  T(C): 37.1 (13 Nov 2018 14:29), Max: 37.4 (13 Nov 2018 12:35)  T(F): 98.7 (13 Nov 2018 14:29), Max: 99.4 (13 Nov 2018 14:04)  HR: 70 (13 Nov 2018 14:29) (70 - 74)  BP: 163/75 (13 Nov 2018 14:29) (149/58 - 176/72)  BP(mean): --  RR: 16 (13 Nov 2018 14:29) (16 - 20)  SpO2: 94% (13 Nov 2018 14:29) (93% - 97%)  CAPILLARY BLOOD GLUCOSE      POCT Blood Glucose.: 166 mg/dL (13 Nov 2018 12:20)  POCT Blood Glucose.: 149 mg/dL (13 Nov 2018 08:14)  POCT Blood Glucose.: 145 mg/dL (12 Nov 2018 21:57)  POCT Blood Glucose.: 120 mg/dL (12 Nov 2018 16:35)    I&O's Summary    12 Nov 2018 07:01  -  13 Nov 2018 07:00  --------------------------------------------------------  IN: 600 mL / OUT: 2350 mL / NET: -1750 mL    PHYSICAL EXAM  GENERAL: NAD, well-developed  HEAD:  Atraumatic, Normocephalic  EYES: EOMI, conjunctiva and sclera clear  NECK: Supple, No JVD  CHEST/LUNG: LLL crackles.; No wheeze  HEART: Regular rate and rhythm; No murmurs, rubs, or gallops  ABDOMEN: Soft, Nontender, Nondistended; Bowel sounds present  EXTREMITIES: Left ankle tenderness to touch,. no swelling or erythema. No clubbing, cyanosis, or edema  PSYCH: AAOx3  SKIN: No rashes or lesions        LABS:                        9.5    4.37  )-----------( 113      ( 13 Nov 2018 09:03 )             29.3     11-13    145  |  103  |  54<H>  ----------------------------<  137<H>  3.7   |  26  |  2.54<H>    Ca    9.4      13 Nov 2018 05:10  Phos  4.4     11-13  Mg     2.3     11-13                  RADIOLOGY & ADDITIONAL TESTS:    Imaging Personally Reviewed:  Consultant(s) Notes Reviewed:    Care Discussed with Consultants/Other Providers:

## 2018-11-13 NOTE — PROGRESS NOTE ADULT - ASSESSMENT
83M, trilingual Swazi/Ku[place]i speaking, c hx CAD s/p 6 stents (last stent 6 years ago), CHF, CKD (unknown baseline), DM2, HTN, HLD, chronic R lower back pain and chronic suprapubic pain, former smoker, pw hypoxic resp failure 2/2 pulm edema likely 2/2 CHF exacerbation and poss RAMÍREZ.

## 2018-11-13 NOTE — PROGRESS NOTE ADULT - SUBJECTIVE AND OBJECTIVE BOX
NEPHROLOGY PROGRESS NOTE    No distress at rest, + FLOREZ    Vital Signs Last 24 Hrs  T(C): 37.1 (11-13-18 @ 14:29), Max: 37.4 (11-13-18 @ 12:35)  T(F): 98.7 (11-13-18 @ 14:29), Max: 99.4 (11-13-18 @ 14:04)  HR: 70 (11-13-18 @ 14:29) (70 - 74)  BP: 163/75 (11-13-18 @ 14:29) (155/56 - 176/72)  RR: 16 (11-13-18 @ 14:29) (16 - 20)  SpO2: 94% (11-13-18 @ 14:29) (94% - 97%)    Gen -Conversant  Lungs - decr BS b/l bases  Heart - S1S2  Abd - soft, NT,  ND, + BS  Extr - sm edema                        9.5    4.37  )-----------( 113      ( 13 Nov 2018 09:03 )             29.3     13 Nov 2018 05:10    145    |  103    |  54     ----------------------------<  137    3.7     |  26     |  2.54     Ca    9.4        13 Nov 2018 05:10  Phos  4.4       13 Nov 2018 05:10  Mg     2.3       13 Nov 2018 05:10    aspirin enteric coated 81 milliGRAM(s) Oral daily  buDESOnide 160 MICROgram(s)/formoterol 4.5 MICROgram(s) Inhaler 2 Puff(s) Inhalation two times a day  clopidogrel Tablet 75 milliGRAM(s) Oral daily  colchicine 1.2 milliGRAM(s) Oral once  dextrose 40% Gel 15 Gram(s) Oral once PRN  dextrose 5%. 1000 milliLiter(s) IV Continuous <Continuous>  dextrose 50% Injectable 12.5 Gram(s) IV Push once  docusate sodium 100 milliGRAM(s) Oral three times a day  doxazosin 2 milliGRAM(s) Oral at bedtime  furosemide    Tablet 40 milliGRAM(s) Oral two times a day  heparin  Injectable 5000 Unit(s) SubCutaneous every 8 hours  hydrALAZINE 100 milliGRAM(s) Oral every 8 hours PRN  influenza   Vaccine 0.5 milliLiter(s) IntraMuscular once  insulin lispro (HumaLOG) corrective regimen sliding scale   SubCutaneous three times a day before meals  insulin lispro (HumaLOG) corrective regimen sliding scale   SubCutaneous at bedtime  metoprolol tartrate 50 milliGRAM(s) Oral two times a day  NIFEdipine XL 90 milliGRAM(s) Oral daily  oxyCODONE    IR 5 milliGRAM(s) Oral every 4 hours PRN  pantoprazole    Tablet 40 milliGRAM(s) Oral before breakfast  senna 2 Tablet(s) Oral at bedtime  sertraline 200 milliGRAM(s) Oral daily  simvastatin 20 milliGRAM(s) Oral at bedtime    A/P:    CM, CHF  CKD, baseline Cr unknown  Rising Cr on IV diuretics noted  Agree w/PO Lasix  Avoid ACE, ARB  Avoid Colchicine  SONO w/o hydro, UA negative  BMP in am  D/w family at bedside

## 2018-11-13 NOTE — PROGRESS NOTE ADULT - PROBLEM SELECTOR PLAN 2
- 2/2 pulm edema  - cont diuresis as above  - trial and monitor off oxygen, off Bipap.   - CT chest show b/l pleural effusions, pulmonary edema.   - hold off antibiotics, as pt does not endorse s/s active infection/URI/PNA symptoms

## 2018-11-14 DIAGNOSIS — R50.9 FEVER, UNSPECIFIED: ICD-10-CM

## 2018-11-14 LAB
% ALBUMIN: 51.9 % — SIGNIFICANT CHANGE UP
% ALPHA 1: 8 % — SIGNIFICANT CHANGE UP
% ALPHA 2: 12.4 % — SIGNIFICANT CHANGE UP
% BETA: 11.5 % — SIGNIFICANT CHANGE UP
% GAMMA: 16.2 % — SIGNIFICANT CHANGE UP
% M SPIKE: SIGNIFICANT CHANGE UP %
ALBUMIN SERPL ELPH-MCNC: 3.7 G/DL — SIGNIFICANT CHANGE UP (ref 3.6–5.5)
ALBUMIN SERPL ELPH-MCNC: 3.8 G/DL — SIGNIFICANT CHANGE UP (ref 3.3–5)
ALBUMIN/GLOB SERPL ELPH: 1.1 RATIO — SIGNIFICANT CHANGE UP
ALP SERPL-CCNC: 50 U/L — SIGNIFICANT CHANGE UP (ref 40–120)
ALPHA1 GLOB SERPL ELPH-MCNC: 0.6 G/DL — HIGH (ref 0.1–0.4)
ALPHA2 GLOB SERPL ELPH-MCNC: 0.9 G/DL — SIGNIFICANT CHANGE UP (ref 0.5–1)
ALT FLD-CCNC: 6 U/L — LOW (ref 10–45)
ANION GAP SERPL CALC-SCNC: 14 MMOL/L — SIGNIFICANT CHANGE UP (ref 5–17)
APPEARANCE UR: CLEAR — SIGNIFICANT CHANGE UP
AST SERPL-CCNC: 9 U/L — LOW (ref 10–40)
B-GLOBULIN SERPL ELPH-MCNC: 0.8 G/DL — SIGNIFICANT CHANGE UP (ref 0.5–1)
BACTERIA # UR AUTO: NEGATIVE — SIGNIFICANT CHANGE UP
BILIRUB SERPL-MCNC: 0.5 MG/DL — SIGNIFICANT CHANGE UP (ref 0.2–1.2)
BILIRUB UR-MCNC: NEGATIVE — SIGNIFICANT CHANGE UP
BUN SERPL-MCNC: 52 MG/DL — HIGH (ref 7–23)
CALCIUM SERPL-MCNC: 9.6 MG/DL — SIGNIFICANT CHANGE UP (ref 8.4–10.5)
CHLORIDE SERPL-SCNC: 100 MMOL/L — SIGNIFICANT CHANGE UP (ref 96–108)
CO2 SERPL-SCNC: 28 MMOL/L — SIGNIFICANT CHANGE UP (ref 22–31)
COLOR SPEC: SIGNIFICANT CHANGE UP
CREAT SERPL-MCNC: 2.45 MG/DL — HIGH (ref 0.5–1.3)
CRP SERPL-MCNC: 23.3 MG/DL — HIGH (ref 0–0.4)
DIFF PNL FLD: NEGATIVE — SIGNIFICANT CHANGE UP
EPI CELLS # UR: 0 /HPF — SIGNIFICANT CHANGE UP
ERYTHROCYTE [SEDIMENTATION RATE] IN BLOOD: 119 MM/HR — HIGH (ref 0–20)
GAMMA GLOBULIN: 1.2 G/DL — SIGNIFICANT CHANGE UP (ref 0.6–1.6)
GLUCOSE BLDC GLUCOMTR-MCNC: 143 MG/DL — HIGH (ref 70–99)
GLUCOSE BLDC GLUCOMTR-MCNC: 178 MG/DL — HIGH (ref 70–99)
GLUCOSE BLDC GLUCOMTR-MCNC: 182 MG/DL — HIGH (ref 70–99)
GLUCOSE BLDC GLUCOMTR-MCNC: 183 MG/DL — HIGH (ref 70–99)
GLUCOSE SERPL-MCNC: 134 MG/DL — HIGH (ref 70–99)
GLUCOSE UR QL: NEGATIVE — SIGNIFICANT CHANGE UP
HCT VFR BLD CALC: 30.9 % — LOW (ref 39–50)
HGB BLD-MCNC: 10.1 G/DL — LOW (ref 13–17)
HYALINE CASTS # UR AUTO: 0 /LPF — SIGNIFICANT CHANGE UP (ref 0–2)
INTERPRETATION SERPL IFE-IMP: SIGNIFICANT CHANGE UP
KETONES UR-MCNC: NEGATIVE — SIGNIFICANT CHANGE UP
LEUKOCYTE ESTERASE UR-ACNC: NEGATIVE — SIGNIFICANT CHANGE UP
M-SPIKE: SIGNIFICANT CHANGE UP G/DL (ref 0–0)
MCHC RBC-ENTMCNC: 29.6 PG — SIGNIFICANT CHANGE UP (ref 27–34)
MCHC RBC-ENTMCNC: 32.8 GM/DL — SIGNIFICANT CHANGE UP (ref 32–36)
MCV RBC AUTO: 90.3 FL — SIGNIFICANT CHANGE UP (ref 80–100)
NITRITE UR-MCNC: NEGATIVE — SIGNIFICANT CHANGE UP
PH UR: 6 — SIGNIFICANT CHANGE UP (ref 5–8)
PLATELET # BLD AUTO: 110 K/UL — LOW (ref 150–400)
POTASSIUM SERPL-MCNC: 3.4 MMOL/L — LOW (ref 3.5–5.3)
POTASSIUM SERPL-SCNC: 3.4 MMOL/L — LOW (ref 3.5–5.3)
PROT PATTERN SERPL ELPH-IMP: SIGNIFICANT CHANGE UP
PROT SERPL-MCNC: 7.1 G/DL — SIGNIFICANT CHANGE UP (ref 6–8.3)
PROT SERPL-MCNC: 7.1 G/DL — SIGNIFICANT CHANGE UP (ref 6–8.3)
PROT UR-MCNC: ABNORMAL
RBC # BLD: 3.42 M/UL — LOW (ref 4.2–5.8)
RBC # FLD: 14.2 % — SIGNIFICANT CHANGE UP (ref 10.3–14.5)
RBC CASTS # UR COMP ASSIST: 4 /HPF — SIGNIFICANT CHANGE UP (ref 0–4)
SODIUM SERPL-SCNC: 142 MMOL/L — SIGNIFICANT CHANGE UP (ref 135–145)
SP GR SPEC: 1.02 — SIGNIFICANT CHANGE UP (ref 1.01–1.02)
URATE SERPL-MCNC: 6.3 MG/DL — SIGNIFICANT CHANGE UP (ref 3.4–8.8)
UROBILINOGEN FLD QL: NEGATIVE — SIGNIFICANT CHANGE UP
WBC # BLD: 4.5 K/UL — SIGNIFICANT CHANGE UP (ref 3.8–10.5)
WBC # FLD AUTO: 4.5 K/UL — SIGNIFICANT CHANGE UP (ref 3.8–10.5)
WBC UR QL: 1 /HPF — SIGNIFICANT CHANGE UP (ref 0–5)

## 2018-11-14 PROCEDURE — 99223 1ST HOSP IP/OBS HIGH 75: CPT | Mod: GC

## 2018-11-14 PROCEDURE — 99233 SBSQ HOSP IP/OBS HIGH 50: CPT

## 2018-11-14 PROCEDURE — 99222 1ST HOSP IP/OBS MODERATE 55: CPT

## 2018-11-14 RX ORDER — ACETAMINOPHEN 500 MG
650 TABLET ORAL EVERY 6 HOURS
Qty: 0 | Refills: 0 | Status: DISCONTINUED | OUTPATIENT
Start: 2018-11-14 | End: 2018-11-20

## 2018-11-14 RX ADMIN — BUDESONIDE AND FORMOTEROL FUMARATE DIHYDRATE 2 PUFF(S): 160; 4.5 AEROSOL RESPIRATORY (INHALATION) at 05:48

## 2018-11-14 RX ADMIN — Medication 100 MILLIGRAM(S): at 13:28

## 2018-11-14 RX ADMIN — Medication 40 MILLIGRAM(S): at 17:17

## 2018-11-14 RX ADMIN — HEPARIN SODIUM 5000 UNIT(S): 5000 INJECTION INTRAVENOUS; SUBCUTANEOUS at 13:28

## 2018-11-14 RX ADMIN — SIMVASTATIN 20 MILLIGRAM(S): 20 TABLET, FILM COATED ORAL at 21:53

## 2018-11-14 RX ADMIN — Medication 20 MILLIGRAM(S): at 18:47

## 2018-11-14 RX ADMIN — Medication 1: at 12:36

## 2018-11-14 RX ADMIN — Medication 40 MILLIGRAM(S): at 05:48

## 2018-11-14 RX ADMIN — Medication 90 MILLIGRAM(S): at 05:48

## 2018-11-14 RX ADMIN — Medication 100 MILLIGRAM(S): at 18:54

## 2018-11-14 RX ADMIN — Medication 81 MILLIGRAM(S): at 12:49

## 2018-11-14 RX ADMIN — SENNA PLUS 2 TABLET(S): 8.6 TABLET ORAL at 21:53

## 2018-11-14 RX ADMIN — PANTOPRAZOLE SODIUM 40 MILLIGRAM(S): 20 TABLET, DELAYED RELEASE ORAL at 06:49

## 2018-11-14 RX ADMIN — Medication 100 MILLIGRAM(S): at 05:48

## 2018-11-14 RX ADMIN — OXYCODONE HYDROCHLORIDE 5 MILLIGRAM(S): 5 TABLET ORAL at 09:35

## 2018-11-14 RX ADMIN — OXYCODONE HYDROCHLORIDE 5 MILLIGRAM(S): 5 TABLET ORAL at 14:28

## 2018-11-14 RX ADMIN — HEPARIN SODIUM 5000 UNIT(S): 5000 INJECTION INTRAVENOUS; SUBCUTANEOUS at 21:53

## 2018-11-14 RX ADMIN — OXYCODONE HYDROCHLORIDE 5 MILLIGRAM(S): 5 TABLET ORAL at 13:26

## 2018-11-14 RX ADMIN — BUDESONIDE AND FORMOTEROL FUMARATE DIHYDRATE 2 PUFF(S): 160; 4.5 AEROSOL RESPIRATORY (INHALATION) at 17:17

## 2018-11-14 RX ADMIN — Medication 50 MILLIGRAM(S): at 17:17

## 2018-11-14 RX ADMIN — SERTRALINE 200 MILLIGRAM(S): 25 TABLET, FILM COATED ORAL at 12:49

## 2018-11-14 RX ADMIN — CLOPIDOGREL BISULFATE 75 MILLIGRAM(S): 75 TABLET, FILM COATED ORAL at 12:49

## 2018-11-14 RX ADMIN — Medication 1: at 16:52

## 2018-11-14 RX ADMIN — OXYCODONE HYDROCHLORIDE 5 MILLIGRAM(S): 5 TABLET ORAL at 09:45

## 2018-11-14 RX ADMIN — Medication 100 MILLIGRAM(S): at 21:53

## 2018-11-14 RX ADMIN — HEPARIN SODIUM 5000 UNIT(S): 5000 INJECTION INTRAVENOUS; SUBCUTANEOUS at 05:48

## 2018-11-14 RX ADMIN — Medication 2 MILLIGRAM(S): at 21:53

## 2018-11-14 RX ADMIN — Medication 50 MILLIGRAM(S): at 05:48

## 2018-11-14 NOTE — CONSULT NOTE ADULT - ASSESSMENT
83M, hx CAD s/p 6 stents (last stent 6 years ago), CHF, CKD (unknown baseline), DM2, HTN, HLD, chronic R lower back pain and chronic suprapubic pain, former smoker, presents with 3 days of increasing SOB, leg swelling, and chest pain.  Pt was diagnosed with CHF exacerbation, has been treated for it. Started complaining of rt ankle pain, with fever overnight.   Overall Fever, rt ankle pain, elevated ESR/CRP.   Given recent use of diuretics findings compatible with possible acute gouty arthritis.   U/A negative, pt with no abdominal pain or diarrhea, minimal cough.     Plan:   Observe off abx.   Management of gout per Primary  Follow up blood cx results.   Follow up urine cx results.   If continues to spike might need joint aspiration.     Plan discussed with daughter at bedside.

## 2018-11-14 NOTE — PROGRESS NOTE ADULT - PROBLEM SELECTOR PLAN 8
- A1c 4.7  - pt states he has been giving himself variable lantus, but usually around 7U a night.  - monitoring off basal insulin

## 2018-11-14 NOTE — PROVIDER CONTACT NOTE (OTHER) - ACTION/TREATMENT ORDERED:
blood c/s sent, x ray chest done, oxycodone 5 mg po for pain and Tylenol 650 mg po for fever given, will continue to monitor blood c/s sent, x ray abdomen done, oxycodone 5 mg po for pain and Tylenol 650 mg po for fever given, will continue to monitor

## 2018-11-14 NOTE — PROGRESS NOTE ADULT - SUBJECTIVE AND OBJECTIVE BOX
Patient is a 83y old  Male who presents with a chief complaint of respiratory distress, SOB, chest pain (13 Nov 2018 17:20)        SUBJECTIVE / OVERNIGHT EVENTS:  still with right LE pain. unchanged. chronic lower back pain.   Overnight episode of nausea and vomiting. fever of 101.2.   C/o abd pain but now denies. ?urinary burning overnight,       MEDICATIONS  (STANDING):  aspirin enteric coated 81 milliGRAM(s) Oral daily  buDESOnide 160 MICROgram(s)/formoterol 4.5 MICROgram(s) Inhaler 2 Puff(s) Inhalation two times a day  clopidogrel Tablet 75 milliGRAM(s) Oral daily  dextrose 5%. 1000 milliLiter(s) (50 mL/Hr) IV Continuous <Continuous>  dextrose 50% Injectable 12.5 Gram(s) IV Push once  docusate sodium 100 milliGRAM(s) Oral three times a day  doxazosin 2 milliGRAM(s) Oral at bedtime  furosemide    Tablet 40 milliGRAM(s) Oral two times a day  heparin  Injectable 5000 Unit(s) SubCutaneous every 8 hours  influenza   Vaccine 0.5 milliLiter(s) IntraMuscular once  insulin lispro (HumaLOG) corrective regimen sliding scale   SubCutaneous three times a day before meals  insulin lispro (HumaLOG) corrective regimen sliding scale   SubCutaneous at bedtime  metoprolol tartrate 50 milliGRAM(s) Oral two times a day  NIFEdipine XL 90 milliGRAM(s) Oral daily  pantoprazole    Tablet 40 milliGRAM(s) Oral before breakfast  senna 2 Tablet(s) Oral at bedtime  sertraline 200 milliGRAM(s) Oral daily  simvastatin 20 milliGRAM(s) Oral at bedtime    MEDICATIONS  (PRN):  dextrose 40% Gel 15 Gram(s) Oral once PRN Blood Glucose LESS THAN 70 milliGRAM(s)/deciliter  hydrALAZINE 100 milliGRAM(s) Oral every 8 hours PRN Systolic blood pressure > 170  oxyCODONE    IR 5 milliGRAM(s) Oral every 4 hours PRN Severe Pain (7 - 10)      Vital Signs Last 24 Hrs  T(C): 36.7 (14 Nov 2018 04:07), Max: 38.4 (13 Nov 2018 20:33)  T(F): 98.1 (14 Nov 2018 04:07), Max: 101.2 (13 Nov 2018 20:33)  HR: 72 (14 Nov 2018 04:07) (67 - 86)  BP: 162/64 (14 Nov 2018 04:07) (155/57 - 184/69)  BP(mean): --  RR: 18 (14 Nov 2018 04:07) (16 - 18)  SpO2: 98% (14 Nov 2018 04:07) (94% - 98%)  CAPILLARY BLOOD GLUCOSE      POCT Blood Glucose.: 183 mg/dL (14 Nov 2018 12:12)  POCT Blood Glucose.: 143 mg/dL (14 Nov 2018 07:53)  POCT Blood Glucose.: 166 mg/dL (13 Nov 2018 21:37)  POCT Blood Glucose.: 185 mg/dL (13 Nov 2018 16:33)    I&O's Summary    13 Nov 2018 07:01  -  14 Nov 2018 07:00  --------------------------------------------------------  IN: 360 mL / OUT: 1400 mL / NET: -1040 mL    14 Nov 2018 07:01  -  14 Nov 2018 12:28  --------------------------------------------------------  IN: 0 mL / OUT: 100 mL / NET: -100 mL          PHYSICAL EXAM  GENERAL: mod distess from pain  HEAD:  Atraumatic, Normocephalic  EYES: EOMI, conjunctiva and sclera clear  NECK: Supple, No JVD  CHEST/LUNG: LLL crackles.; No wheeze  HEART: Regular rate and rhythm; systolic murmur  ABDOMEN: Soft, Nontender, Nondistended; Bowel sounds present  EXTREMITIES: Left ankle tenderness to touch,. no swelling or erythema. No clubbing, cyanosis, or edema  PSYCH: AAOx3  SKIN: No rashes or lesions    LABS:                        10.1   4.5   )-----------( 110      ( 14 Nov 2018 11:24 )             30.9     11-14    142  |  100  |  52<H>  ----------------------------<  134<H>  3.4<L>   |  28  |  2.45<H>    Ca    9.6      14 Nov 2018 06:01  Phos  4.4     11-13  Mg     2.3     11-13    TPro  7.1  /  Alb  3.8  /  TBili  0.5  /  DBili  x   /  AST  9<L>  /  ALT  6<L>  /  AlkPhos  50  11-14                RADIOLOGY & ADDITIONAL TESTS:    Imaging Personally Reviewed:  Consultant(s) Notes Reviewed:    Care Discussed with Consultants/Other Providers:

## 2018-11-14 NOTE — CONSULT NOTE ADULT - ASSESSMENT
83M trilingual Bengali/Maltese speaking, c hx CAD s/p 6 stents (last stent 6 years ago), CHF, CKD (unknown baseline), DM2, HTN, HLD, chronic R lower back pain and chronic suprapubic pain, former smoker, presents with 3 days of increasing SOB, leg swelling, and chest pain. Patient diuresed and now appears euvolemic however with severe MR on physical exam and on TTE. Hyperdynamic LVEF. Only had mild to mod     -continue lasix PO BID, if Cr does not improve then consider going down to lasix 40mg daily  -increase afterload reducing agents as tolerated. Avoid ACEI/ARB for now  -would recommend MARBIN, keep NPO after MN  -will have structural heart team see patient in AM    Maki Romero MD  Cardiology Fellow PGY-5  [phone]

## 2018-11-14 NOTE — PROGRESS NOTE ADULT - ASSESSMENT
83M, trilingual Cameroonian/Albanian speaking, c hx CAD s/p 6 stents (last stent 6 years ago), CHF, CKD (unknown baseline), DM2, HTN, HLD, chronic R lower back pain and chronic suprapubic pain, former smoker, pw hypoxic resp failure 2/2 pulm edema likely 2/2 CHF exacerbation and poss RAMÍREZ.

## 2018-11-14 NOTE — PROGRESS NOTE ADULT - SUBJECTIVE AND OBJECTIVE BOX
NEPHROLOGY PROGRESS NOTE    Resting    Vital Signs Last 24 Hrs  T(C): 37.3 (11-14-18 @ 12:47), Max: 38.4 (11-13-18 @ 20:33)  T(F): 99.2 (11-14-18 @ 12:47), Max: 101.2 (11-13-18 @ 20:33)  HR: 59 (11-14-18 @ 12:47) (59 - 86)  BP: 134/57 (11-14-18 @ 12:47) (134/57 - 184/69)  RR: 20 (11-14-18 @ 12:47) (16 - 20)  SpO2: 91% (11-14-18 @ 12:47) (91% - 98%)    Lungs - decr BS b/l bases  Heart - S1S2  Abd - soft, NT,  ND, + BS  Extr - sm edema                                10.1   4.5   )-----------( 110      ( 14 Nov 2018 11:24 )             30.9     14 Nov 2018 06:01    142    |  100    |  52     ----------------------------<  134    3.4     |  28     |  2.45     Ca    9.6        14 Nov 2018 06:01  Phos  4.4       13 Nov 2018 05:10  Mg     2.3       13 Nov 2018 05:10    TPro  7.1    /  Alb  3.8    /  TBili  0.5    /  DBili  x      /  AST  9      /  ALT  6      /  AlkPhos  50     14 Nov 2018 06:01    LIVER FUNCTIONS - ( 14 Nov 2018 06:01 )  Alb: 3.8 g/dL / Pro: 7.1 g/dL / ALK PHOS: 50 U/L / ALT: 6 U/L / AST: 9 U/L / GGT: x           aspirin enteric coated 81 milliGRAM(s) Oral daily  buDESOnide 160 MICROgram(s)/formoterol 4.5 MICROgram(s) Inhaler 2 Puff(s) Inhalation two times a day  clopidogrel Tablet 75 milliGRAM(s) Oral daily  dextrose 40% Gel 15 Gram(s) Oral once PRN  dextrose 5%. 1000 milliLiter(s) IV Continuous <Continuous>  dextrose 50% Injectable 12.5 Gram(s) IV Push once  docusate sodium 100 milliGRAM(s) Oral three times a day  doxazosin 2 milliGRAM(s) Oral at bedtime  furosemide    Tablet 40 milliGRAM(s) Oral two times a day  heparin  Injectable 5000 Unit(s) SubCutaneous every 8 hours  hydrALAZINE 100 milliGRAM(s) Oral every 8 hours PRN  influenza   Vaccine 0.5 milliLiter(s) IntraMuscular once  insulin lispro (HumaLOG) corrective regimen sliding scale   SubCutaneous three times a day before meals  insulin lispro (HumaLOG) corrective regimen sliding scale   SubCutaneous at bedtime  metoprolol tartrate 50 milliGRAM(s) Oral two times a day  NIFEdipine XL 90 milliGRAM(s) Oral daily  oxyCODONE    IR 5 milliGRAM(s) Oral every 4 hours PRN  pantoprazole    Tablet 40 milliGRAM(s) Oral before breakfast  senna 2 Tablet(s) Oral at bedtime  sertraline 200 milliGRAM(s) Oral daily  simvastatin 20 milliGRAM(s) Oral at bedtime    A/P:    CM, CHF  CKD, baseline Cr unknown  Cr fairly stable  Agree w/PO Lasix  Avoid ACE, ARB  Avoid Colchicine  SONO w/o hydro, UA negative  BMP in am  PT eval

## 2018-11-14 NOTE — PROGRESS NOTE ADULT - PROBLEM SELECTOR PLAN 2
Xray unremarkable. very sensitive to touch without erythema or swellling.   Holding off colchicine as per renal rec.  May consider steroid if less likely infectious in etiology.

## 2018-11-14 NOTE — PROGRESS NOTE ADULT - PROBLEM SELECTOR PLAN 1
Unclear etiology. normal WBC. nonspecific complaints except for joint pains (left ankle (acute) and back which is chronic).  Gout can cause fevers but exam seems less like gout flare.   Xray showing small amount of joint fluid but no bone abnormalities.   ID felaal called. monitor off abx for now.  Follow up cultures. check ESR/CRP.

## 2018-11-14 NOTE — PROVIDER CONTACT NOTE (OTHER) - RECOMMENDATIONS
blood c/s , x ray chest ordered, oxycodone 5 mg po for pain and Tylenol 650 mg po for fever ordered, will continue to monitor blood c/s , x ray abdomen ordered, oxycodone 5 mg po for pain and Tylenol 650 mg po for fever ordered, will continue to monitor

## 2018-11-15 LAB
ANION GAP SERPL CALC-SCNC: 13 MMOL/L — SIGNIFICANT CHANGE UP (ref 5–17)
BUN SERPL-MCNC: 55 MG/DL — HIGH (ref 7–23)
CALCIUM SERPL-MCNC: 9.5 MG/DL — SIGNIFICANT CHANGE UP (ref 8.4–10.5)
CHLORIDE SERPL-SCNC: 98 MMOL/L — SIGNIFICANT CHANGE UP (ref 96–108)
CO2 SERPL-SCNC: 28 MMOL/L — SIGNIFICANT CHANGE UP (ref 22–31)
CREAT SERPL-MCNC: 2.44 MG/DL — HIGH (ref 0.5–1.3)
GLUCOSE BLDC GLUCOMTR-MCNC: 149 MG/DL — HIGH (ref 70–99)
GLUCOSE BLDC GLUCOMTR-MCNC: 170 MG/DL — HIGH (ref 70–99)
GLUCOSE BLDC GLUCOMTR-MCNC: 272 MG/DL — HIGH (ref 70–99)
GLUCOSE BLDC GLUCOMTR-MCNC: 291 MG/DL — HIGH (ref 70–99)
GLUCOSE SERPL-MCNC: 155 MG/DL — HIGH (ref 70–99)
HCT VFR BLD CALC: 27.1 % — LOW (ref 39–50)
HGB BLD-MCNC: 8.6 G/DL — LOW (ref 13–17)
MCHC RBC-ENTMCNC: 29.3 PG — SIGNIFICANT CHANGE UP (ref 27–34)
MCHC RBC-ENTMCNC: 31.7 GM/DL — LOW (ref 32–36)
MCV RBC AUTO: 92.2 FL — SIGNIFICANT CHANGE UP (ref 80–100)
PLATELET # BLD AUTO: 123 K/UL — LOW (ref 150–400)
POTASSIUM SERPL-MCNC: 3.3 MMOL/L — LOW (ref 3.5–5.3)
POTASSIUM SERPL-SCNC: 3.3 MMOL/L — LOW (ref 3.5–5.3)
PROT SERPL-MCNC: 6.7 G/DL — SIGNIFICANT CHANGE UP (ref 6–8.3)
RBC # BLD: 2.94 M/UL — LOW (ref 4.2–5.8)
RBC # FLD: 14.7 % — HIGH (ref 10.3–14.5)
SODIUM SERPL-SCNC: 139 MMOL/L — SIGNIFICANT CHANGE UP (ref 135–145)
WBC # BLD: 4.01 K/UL — SIGNIFICANT CHANGE UP (ref 3.8–10.5)
WBC # FLD AUTO: 4.01 K/UL — SIGNIFICANT CHANGE UP (ref 3.8–10.5)

## 2018-11-15 PROCEDURE — 99232 SBSQ HOSP IP/OBS MODERATE 35: CPT | Mod: GC

## 2018-11-15 PROCEDURE — 99222 1ST HOSP IP/OBS MODERATE 55: CPT | Mod: GC

## 2018-11-15 PROCEDURE — 99232 SBSQ HOSP IP/OBS MODERATE 35: CPT

## 2018-11-15 PROCEDURE — 99233 SBSQ HOSP IP/OBS HIGH 50: CPT

## 2018-11-15 RX ORDER — POTASSIUM CHLORIDE 20 MEQ
20 PACKET (EA) ORAL ONCE
Qty: 0 | Refills: 0 | Status: COMPLETED | OUTPATIENT
Start: 2018-11-15 | End: 2018-11-15

## 2018-11-15 RX ADMIN — SENNA PLUS 2 TABLET(S): 8.6 TABLET ORAL at 21:27

## 2018-11-15 RX ADMIN — Medication 40 MILLIGRAM(S): at 05:16

## 2018-11-15 RX ADMIN — OXYCODONE HYDROCHLORIDE 5 MILLIGRAM(S): 5 TABLET ORAL at 12:33

## 2018-11-15 RX ADMIN — HEPARIN SODIUM 5000 UNIT(S): 5000 INJECTION INTRAVENOUS; SUBCUTANEOUS at 05:15

## 2018-11-15 RX ADMIN — Medication 100 MILLIGRAM(S): at 05:15

## 2018-11-15 RX ADMIN — Medication 50 MILLIGRAM(S): at 05:16

## 2018-11-15 RX ADMIN — Medication 90 MILLIGRAM(S): at 05:16

## 2018-11-15 RX ADMIN — Medication 3: at 11:36

## 2018-11-15 RX ADMIN — PANTOPRAZOLE SODIUM 40 MILLIGRAM(S): 20 TABLET, DELAYED RELEASE ORAL at 05:17

## 2018-11-15 RX ADMIN — Medication 50 MILLIGRAM(S): at 17:07

## 2018-11-15 RX ADMIN — SIMVASTATIN 20 MILLIGRAM(S): 20 TABLET, FILM COATED ORAL at 21:27

## 2018-11-15 RX ADMIN — OXYCODONE HYDROCHLORIDE 5 MILLIGRAM(S): 5 TABLET ORAL at 12:03

## 2018-11-15 RX ADMIN — Medication 2 MILLIGRAM(S): at 21:27

## 2018-11-15 RX ADMIN — Medication 20 MILLIEQUIVALENT(S): at 16:40

## 2018-11-15 RX ADMIN — Medication 20 MILLIGRAM(S): at 05:16

## 2018-11-15 RX ADMIN — Medication 40 MILLIGRAM(S): at 17:08

## 2018-11-15 RX ADMIN — Medication 100 MILLIGRAM(S): at 13:10

## 2018-11-15 RX ADMIN — CLOPIDOGREL BISULFATE 75 MILLIGRAM(S): 75 TABLET, FILM COATED ORAL at 11:07

## 2018-11-15 RX ADMIN — Medication 1: at 16:50

## 2018-11-15 RX ADMIN — SERTRALINE 200 MILLIGRAM(S): 25 TABLET, FILM COATED ORAL at 11:07

## 2018-11-15 RX ADMIN — HEPARIN SODIUM 5000 UNIT(S): 5000 INJECTION INTRAVENOUS; SUBCUTANEOUS at 21:26

## 2018-11-15 RX ADMIN — Medication 81 MILLIGRAM(S): at 11:07

## 2018-11-15 RX ADMIN — Medication 40 MILLIGRAM(S): at 14:10

## 2018-11-15 RX ADMIN — Medication 100 MILLIGRAM(S): at 21:27

## 2018-11-15 RX ADMIN — BUDESONIDE AND FORMOTEROL FUMARATE DIHYDRATE 2 PUFF(S): 160; 4.5 AEROSOL RESPIRATORY (INHALATION) at 05:16

## 2018-11-15 RX ADMIN — Medication 1: at 22:02

## 2018-11-15 RX ADMIN — HEPARIN SODIUM 5000 UNIT(S): 5000 INJECTION INTRAVENOUS; SUBCUTANEOUS at 13:10

## 2018-11-15 RX ADMIN — BUDESONIDE AND FORMOTEROL FUMARATE DIHYDRATE 2 PUFF(S): 160; 4.5 AEROSOL RESPIRATORY (INHALATION) at 17:08

## 2018-11-15 NOTE — PROGRESS NOTE ADULT - SUBJECTIVE AND OBJECTIVE BOX
NEPHROLOGY PROGRESS NOTE    Resting    Vital Signs Last 24 Hrs  T(C): 37.3 (11-14-18 @ 12:47), Max: 38.4 (11-13-18 @ 20:33)  T(F): 99.2 (11-14-18 @ 12:47), Max: 101.2 (11-13-18 @ 20:33)  HR: 59 (11-14-18 @ 12:47) (59 - 86)  BP: 134/57 (11-14-18 @ 12:47) (134/57 - 184/69)  RR: 20 (11-14-18 @ 12:47) (16 - 20)  SpO2: 91% (11-14-18 @ 12:47) (91% - 98%)    Lungs - decr BS b/l bases  Heart - S1S2  Abd - soft, NT,  ND, + BS  Extr - sm edema                                10.1   4.5   )-----------( 110      ( 14 Nov 2018 11:24 )             30.9     14 Nov 2018 06:01    142    |  100    |  52     ----------------------------<  134    3.4     |  28     |  2.45     Ca    9.6        14 Nov 2018 06:01  Phos  4.4       13 Nov 2018 05:10  Mg     2.3       13 Nov 2018 05:10    TPro  7.1    /  Alb  3.8    /  TBili  0.5    /  DBili  x      /  AST  9      /  ALT  6      /  AlkPhos  50     14 Nov 2018 06:01    LIVER FUNCTIONS - ( 14 Nov 2018 06:01 )  Alb: 3.8 g/dL / Pro: 7.1 g/dL / ALK PHOS: 50 U/L / ALT: 6 U/L / AST: 9 U/L / GGT: x           aspirin enteric coated 81 milliGRAM(s) Oral daily  buDESOnide 160 MICROgram(s)/formoterol 4.5 MICROgram(s) Inhaler 2 Puff(s) Inhalation two times a day  clopidogrel Tablet 75 milliGRAM(s) Oral daily  dextrose 40% Gel 15 Gram(s) Oral once PRN  dextrose 5%. 1000 milliLiter(s) IV Continuous <Continuous>  dextrose 50% Injectable 12.5 Gram(s) IV Push once  docusate sodium 100 milliGRAM(s) Oral three times a day  doxazosin 2 milliGRAM(s) Oral at bedtime  furosemide    Tablet 40 milliGRAM(s) Oral two times a day  heparin  Injectable 5000 Unit(s) SubCutaneous every 8 hours  hydrALAZINE 100 milliGRAM(s) Oral every 8 hours PRN  influenza   Vaccine 0.5 milliLiter(s) IntraMuscular once  insulin lispro (HumaLOG) corrective regimen sliding scale   SubCutaneous three times a day before meals  insulin lispro (HumaLOG) corrective regimen sliding scale   SubCutaneous at bedtime  metoprolol tartrate 50 milliGRAM(s) Oral two times a day  NIFEdipine XL 90 milliGRAM(s) Oral daily  oxyCODONE    IR 5 milliGRAM(s) Oral every 4 hours PRN  pantoprazole    Tablet 40 milliGRAM(s) Oral before breakfast  senna 2 Tablet(s) Oral at bedtime  sertraline 200 milliGRAM(s) Oral daily  simvastatin 20 milliGRAM(s) Oral at bedtime    A/P:    CM, CHF  CKD, baseline Cr unknown  Cr fairly stable  Agree w/PO Lasix  Avoid ACE, ARB  Avoid Colchicine  SONO w/o hydro, UA negative  BMP in am  PT eval NEPHROLOGY PROGRESS NOTE    Resting, denies CP, + FLOREZ    Vital Signs Last 24 Hrs  T(C): 36.7 (11-15-18 @ 17:10), Max: 38.2 (18 @ 18:52)  T(F): 98 (11-15-18 @ 17:10), Max: 100.8 (18 @ 18:52)  HR: 59 (11-15-18 @ 16:23) (58 - 74)  BP: 148/60 (11-15-18 @ 16:23) (129/61 - 170/67)  RR: 18 (11-15-18 @ 11:28) (16 - 18)  SpO2: 97% (11-15-18 @ 15:34) (90% - 99%)    Lungs - decr BS b/l bases  Heart - S1S2  Abd - soft, NT,  ND, + BS  Extr - sm edema                                       8.6    4.01  )-----------( 123      ( 15 Nov 2018 07:56 )             27.1     15 Nov 2018 06:20    139    |  98     |  55     ----------------------------<  155    3.3     |  28     |  2.44     Ca    9.5        15 Nov 2018 06:20    TPro  6.7    /  Alb  3.7    /  TBili  x      /  DBili  x      /  AST  x      /  ALT  x      /  AlkPhos  x      2018 07:41    LIVER FUNCTIONS - ( 2018 07:41 )  Alb: 3.7 g/dL / Pro: 6.7 g/dL / ALK PHOS: x     / ALT: x     / AST: x     / GGT: x           Urinalysis Basic - ( 2018 12:56 )    Color: Light Yellow / Appearance: Clear / S.016 / pH: x  Gluc: x / Ketone: Negative  / Bili: Negative / Urobili: Negative   Blood: x / Protein: 30 mg/dL / Nitrite: Negative   Leuk Esterase: Negative / RBC: 4 /hpf / WBC 1 /hpf   Sq Epi: x / Non Sq Epi: 0 /hpf / Bacteria: Negative    acetaminophen   Tablet .. 650 milliGRAM(s) Oral every 6 hours PRN  aspirin enteric coated 81 milliGRAM(s) Oral daily  buDESOnide 160 MICROgram(s)/formoterol 4.5 MICROgram(s) Inhaler 2 Puff(s) Inhalation two times a day  clopidogrel Tablet 75 milliGRAM(s) Oral daily  dextrose 40% Gel 15 Gram(s) Oral once PRN  dextrose 5%. 1000 milliLiter(s) IV Continuous <Continuous>  dextrose 50% Injectable 12.5 Gram(s) IV Push once  docusate sodium 100 milliGRAM(s) Oral three times a day  doxazosin 2 milliGRAM(s) Oral at bedtime  furosemide    Tablet 40 milliGRAM(s) Oral two times a day  heparin  Injectable 5000 Unit(s) SubCutaneous every 8 hours  hydrALAZINE 100 milliGRAM(s) Oral every 8 hours PRN  influenza   Vaccine 0.5 milliLiter(s) IntraMuscular once  insulin lispro (HumaLOG) corrective regimen sliding scale   SubCutaneous three times a day before meals  insulin lispro (HumaLOG) corrective regimen sliding scale   SubCutaneous at bedtime  methylPREDNISolone sodium succinate Injectable 40 milliGRAM(s) IV Push daily  metoprolol tartrate 50 milliGRAM(s) Oral two times a day  NIFEdipine XL 90 milliGRAM(s) Oral daily  oxyCODONE    IR 5 milliGRAM(s) Oral every 4 hours PRN  pantoprazole    Tablet 40 milliGRAM(s) Oral before breakfast  senna 2 Tablet(s) Oral at bedtime  sertraline 200 milliGRAM(s) Oral daily  simvastatin 20 milliGRAM(s) Oral at bedtime    A/P:    CM, CHF  CKD, baseline Cr unknown  Cr fairly stable  Continue PO Lasix  Avoid ACE, ARB  SONO w/o hydro, UA negative  BMP in am  Suppl K, check Mg level  PT eval

## 2018-11-15 NOTE — PROGRESS NOTE ADULT - ASSESSMENT
83M trilingual [place]ese/Upper sor[place] speaking, c hx CAD s/p 6 stents (last stent 6 years ago), CHF, CKD (unknown baseline), DM2, HTN, HLD, chronic R lower back pain and chronic suprapubic pain, former smoker, presents with 3 days of increasing SOB, leg swelling, and chest pain. Patient diuresed and now appears euvolemic however with severe MR on physical exam and on TTE. Hyperdynamic LVEF. Only had mild to mod MR on previous echo in 2015    -continue lasix PO BID, if Cr does not improve then consider going down to lasix 40mg daily  -increase afterload reducing agents as tolerated. Avoid ACEI/ARB for now  -patient febrile, MARBIN held today, will reattempt tomorrow  -will have structural heart team see patient after MARBIN    Maki Romero MD  Cardiology Fellow PGY-5  [phone]

## 2018-11-15 NOTE — CONSULT NOTE ADULT - ATTENDING COMMENTS
Davion Dean  Pager: [phone]. If no response or past 5 pm call [phone].
Patient interviewed and examined.  Chart reviewed and note edited where appropriate.  Case discussed with fellow.  Agree w/ Assessment and Plan as outlined.    Ryan Zapata MD [facility]  Spectra:  [phone]  Office: [phone]
Patient seen and examined at bedside. Agree with assessment and plan as stated above. Please call [phone] for any questions or concerns

## 2018-11-15 NOTE — PROGRESS NOTE ADULT - SUBJECTIVE AND OBJECTIVE BOX
83y old  Male who presents with a chief complaint of respiratory distress, SOB, chest pain (15 Nov 2018 17:13)      Interval history:  Febrile last night, feels better today, minimal cough, some phlegm, no N/V, no diarrhea. Ankle pain improved.       No Known Allergies    Antimicrobials:      REVIEW OF SYSTEMS:  No chest pain   No abdominal pain  No dysuria   No rash.     Vital Signs Last 24 Hrs  T(C): 36.7 (11-15-18 @ 17:10), Max: 37.5 (11-14-18 @ 21:04)  T(F): 98 (11-15-18 @ 17:10), Max: 99.5 (11-14-18 @ 21:04)  HR: 59 (11-15-18 @ 16:23) (58 - 74)  BP: 148/60 (11-15-18 @ 16:23) (129/61 - 170/67)  RR: 18 (11-15-18 @ 11:28) (16 - 18)  SpO2: 97% (11-15-18 @ 15:34) (90% - 99%)      PHYSICAL EXAM:  Patient in no acute distress. AAOX3. Sitting in recliner.   No icterus, no oral ulcers.  Cardiovascular: S1S2 normal, + murmur.   Lungs: + air entry B/L lung fields.  Gastrointestinal: soft, nontender, nondistended.  Extremities: no edema. Rt ankle with swelling, some warmth, no erythema, ROM improved.   IV sites not inflamed.                           8.6    4.01  )-----------( 123      ( 15 Nov 2018 07:56 )             27.1   11-15    139  |  98  |  55<H>  ----------------------------<  155<H>  3.3<L>   |  28  |  2.44<H>    Ca    9.5      15 Nov 2018 06:20    TPro  6.7  /  Alb  3.7  /  TBili  x   /  DBili  x   /  AST  x   /  ALT  x   /  AlkPhos  x   11-14      LIVER FUNCTIONS - ( 14 Nov 2018 07:41 )  Alb: 3.7 g/dL / Pro: 6.7 g/dL / ALK PHOS: x     / ALT: x     / AST: x     / GGT: x               Culture - Urine (collected 14 Nov 2018 15:11)  Source: .Urine Clean Catch (Midstream)  Preliminary Report (15 Nov 2018 12:58):    10,000 - 49,000 CFU/mL Enterococcus faecalis    Culture - Blood (collected 14 Nov 2018 02:45)  Source: .Blood Blood  Preliminary Report (15 Nov 2018 03:03):    No growth to date.    Culture - Blood (collected 14 Nov 2018 02:42)  Source: .Blood Blood  Preliminary Report (15 Nov 2018 03:03):    No growth to date.

## 2018-11-15 NOTE — CONSULT NOTE ADULT - ASSESSMENT
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 Walls  Rheumatology Fellow  [phone]

## 2018-11-15 NOTE — PROGRESS NOTE ADULT - PROBLEM SELECTOR PLAN 4
- 2/2 pulm edema  - cont diuresis as above  - trial and monitor off oxygen, off Bipap.   - CT chest showed b/l pleural effusions, pulmonary edema on admission.   - hold off antibiotics, as pt does not endorse s/s active infection/URI/PNA symptoms

## 2018-11-15 NOTE — PROGRESS NOTE ADULT - ASSESSMENT
83M, hx CAD s/p 6 stents (last stent 6 years ago), CHF, CKD (unknown baseline), DM2, HTN, HLD, chronic R lower back pain and chronic suprapubic pain, former smoker, presents with 3 days of increasing SOB, leg swelling, and chest pain.  Pt was diagnosed with CHF exacerbation, has been treated for it. Started complaining of rt ankle pain, with fever overnight.   Overall Fever, rt ankle pain, elevated ESR/CRP.   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   U/A negative, pt with no abdominal pain or diarrhea, minimal cough.     Plan:   Observe off abx.   Management of crystal arthropathy per Primary  Follow up blood cx results, NTD   Urine cx with enterococcus but in absence of pyuria represents asymptomatic bacteriuria.   If continues to spike might need joint aspiration.   Rheum on board.

## 2018-11-15 NOTE — PROGRESS NOTE ADULT - ASSESSMENT
83M, trilingual Citizen of [place] speaking, c hx CAD s/p 6 stents (last stent 6 years ago), CHF, CKD (unknown baseline), DM2, HTN, HLD, chronic R lower back pain and chronic suprapubic pain, former smoker, pw hypoxic resp failure 2/2 pulm edema likely 2/2 CHF exacerbation and poss RAMÍREZ.

## 2018-11-15 NOTE — PROGRESS NOTE ADULT - SUBJECTIVE AND OBJECTIVE BOX
Patient is a 83y old  Male who presents with a chief complaint of respiratory distress, SOB, chest pain (2018 18:38)        SUBJECTIVE / OVERNIGHT EVENTS:  Feels much better from pain. had an episode of low grade fever late afternoon 100.8.  Denies any SOB, CP, palpitations.     MEDICATIONS  (STANDING):  aspirin enteric coated 81 milliGRAM(s) Oral daily  buDESOnide 160 MICROgram(s)/formoterol 4.5 MICROgram(s) Inhaler 2 Puff(s) Inhalation two times a day  clopidogrel Tablet 75 milliGRAM(s) Oral daily  dextrose 5%. 1000 milliLiter(s) (50 mL/Hr) IV Continuous <Continuous>  dextrose 50% Injectable 12.5 Gram(s) IV Push once  docusate sodium 100 milliGRAM(s) Oral three times a day  doxazosin 2 milliGRAM(s) Oral at bedtime  furosemide    Tablet 40 milliGRAM(s) Oral two times a day  heparin  Injectable 5000 Unit(s) SubCutaneous every 8 hours  influenza   Vaccine 0.5 milliLiter(s) IntraMuscular once  insulin lispro (HumaLOG) corrective regimen sliding scale   SubCutaneous three times a day before meals  insulin lispro (HumaLOG) corrective regimen sliding scale   SubCutaneous at bedtime  methylPREDNISolone sodium succinate Injectable 20 milliGRAM(s) IV Push daily  metoprolol tartrate 50 milliGRAM(s) Oral two times a day  NIFEdipine XL 90 milliGRAM(s) Oral daily  pantoprazole    Tablet 40 milliGRAM(s) Oral before breakfast  senna 2 Tablet(s) Oral at bedtime  sertraline 200 milliGRAM(s) Oral daily  simvastatin 20 milliGRAM(s) Oral at bedtime    MEDICATIONS  (PRN):  acetaminophen   Tablet .. 650 milliGRAM(s) Oral every 6 hours PRN Temp greater or equal to 38C (100.4F)  dextrose 40% Gel 15 Gram(s) Oral once PRN Blood Glucose LESS THAN 70 milliGRAM(s)/deciliter  hydrALAZINE 100 milliGRAM(s) Oral every 8 hours PRN Systolic blood pressure > 170  oxyCODONE    IR 5 milliGRAM(s) Oral every 4 hours PRN Severe Pain (7 - 10)      Vital Signs Last 24 Hrs  T(C): 36.8 (15 Nov 2018 11:28), Max: 38.2 (2018 18:52)  T(F): 98.2 (15 Nov 2018 11:28), Max: 100.8 (2018 18:52)  HR: 58 (15 Nov 2018 11:28) (58 - 74)  BP: 129/61 (15 Nov 2018 11:28) (129/61 - 170/67)  BP(mean): --  RR: 18 (15 Nov 2018 11:28) (16 - 18)  SpO2: 96% (15 Nov 2018 11:28) (90% - 99%)  CAPILLARY BLOOD GLUCOSE      POCT Blood Glucose.: 291 mg/dL (15 Nov 2018 11:30)  POCT Blood Glucose.: 149 mg/dL (15 Nov 2018 07:46)  POCT Blood Glucose.: 182 mg/dL (2018 21:30)  POCT Blood Glucose.: 178 mg/dL (2018 16:35)    I&O's Summary    2018 07:  -  15 Nov 2018 07:00  --------------------------------------------------------  IN: 290 mL / OUT: 1450 mL / NET: -1160 mL    15 Nov 2018 07:01  -  15 Nov 2018 13:10  --------------------------------------------------------  IN: 240 mL / OUT: 425 mL / NET: -185 mL      PHYSICAL EXAM  GENERAL: NAD,   HEAD:  Atraumatic, Normocephalic  EYES: EOMI, conjunctiva and sclera clear  NECK: Supple, No JVD  CHEST/LUNG: bibasilar crackles.; No wheeze  HEART: Regular rate and rhythm; systolic murmur  ABDOMEN: Soft, Nontender, Nondistended; Bowel sounds present  EXTREMITIES: Left ankle minimal tenderness,. no swelling or erythema.   PSYCH: AAOx3  SKIN: No rashes or lesions      LABS:                        8.6    4.01  )-----------( 123      ( 15 Nov 2018 07:56 )             27.1     11-15    139  |  98  |  55<H>  ----------------------------<  155<H>  3.3<L>   |  28  |  2.44<H>    Ca    9.5      15 Nov 2018 06:20    TPro  6.7  /  Alb  3.7  /  TBili  x   /  DBili  x   /  AST  x   /  ALT  x   /  AlkPhos  x   11-14          Urinalysis Basic - ( 2018 12:56 )    Color: Light Yellow / Appearance: Clear / S.016 / pH: x  Gluc: x / Ketone: Negative  / Bili: Negative / Urobili: Negative   Blood: x / Protein: 30 mg/dL / Nitrite: Negative   Leuk Esterase: Negative / RBC: 4 /hpf / WBC 1 /hpf   Sq Epi: x / Non Sq Epi: 0 /hpf / Bacteria: Negative        Culture - Urine (collected 2018 15:11)  Source: .Urine Clean Catch (Midstream)  Preliminary Report (15 Nov 2018 12:58):    10,000 - 49,000 CFU/mL Enterococcus faecalis    Culture - Blood (collected 2018 02:45)  Source: .Blood Blood  Preliminary Report (15 Nov 2018 03:03):    No growth to date.    Culture - Blood (collected 2018 02:42)  Source: .Blood Blood  Preliminary Report (15 Nov 2018 03:03):    No growth to date.        RADIOLOGY & ADDITIONAL TESTS:    Imaging Personally Reviewed:  Consultant(s) Notes Reviewed:    Care Discussed with Consultants/Other Providers:

## 2018-11-15 NOTE — PROGRESS NOTE ADULT - PROBLEM SELECTOR PLAN 1
Suspect due to acute gout flare. low grade fever yesterday again,   Seen by ID no evidence of infection. monitor off abx. ID appreciated.  Started on IV steroid daily. Full rheum eval to follow. elevated ESR/CRP.  Xray showing small amount of joint fluid but no bone abnormalities.   Follow up cultures.

## 2018-11-15 NOTE — PROGRESS NOTE ADULT - PROBLEM SELECTOR PLAN 2
Suspected gout as above with fever.   Started on steroid daily. Official rheum eval pending  Holding off colchicine as per renal rec.

## 2018-11-15 NOTE — PROGRESS NOTE ADULT - SUBJECTIVE AND OBJECTIVE BOX
Overnight Events:     REVIEW OF SYSTEMS:  CONSTITUTIONAL: No weakness, fevers or chills  EYES/ENT: No visual changes;  No dysphagia  NECK: No pain or stiffness  RESPIRATORY: No cough, wheezing, hemoptysis; No shortness of breath  CARDIOVASCULAR: No chest pain or palpitations; No lower extremity edema  GASTROINTESTINAL: No abdominal or epigastric pain. No nausea, vomiting, or hematemesis; No diarrhea or constipation. No melena or hematochezia.  BACK: No back pain  GENITOURINARY: No dysuria, frequency or hematuria  NEUROLOGICAL: No numbness or weakness  SKIN: No itching, burning, rashes, or lesions   All other review of systems is negative unless indicated above.            Medications:  acetaminophen   Tablet .. 650 milliGRAM(s) Oral every 6 hours PRN  aspirin enteric coated 81 milliGRAM(s) Oral daily  buDESOnide 160 MICROgram(s)/formoterol 4.5 MICROgram(s) Inhaler 2 Puff(s) Inhalation two times a day  clopidogrel Tablet 75 milliGRAM(s) Oral daily  dextrose 40% Gel 15 Gram(s) Oral once PRN  dextrose 5%. 1000 milliLiter(s) IV Continuous <Continuous>  dextrose 50% Injectable 12.5 Gram(s) IV Push once  docusate sodium 100 milliGRAM(s) Oral three times a day  doxazosin 2 milliGRAM(s) Oral at bedtime  furosemide    Tablet 40 milliGRAM(s) Oral two times a day  heparin  Injectable 5000 Unit(s) SubCutaneous every 8 hours  hydrALAZINE 100 milliGRAM(s) Oral every 8 hours PRN  influenza   Vaccine 0.5 milliLiter(s) IntraMuscular once  insulin lispro (HumaLOG) corrective regimen sliding scale   SubCutaneous three times a day before meals  insulin lispro (HumaLOG) corrective regimen sliding scale   SubCutaneous at bedtime  methylPREDNISolone sodium succinate Injectable 40 milliGRAM(s) IV Push daily  metoprolol tartrate 50 milliGRAM(s) Oral two times a day  NIFEdipine XL 90 milliGRAM(s) Oral daily  oxyCODONE    IR 5 milliGRAM(s) Oral every 4 hours PRN  pantoprazole    Tablet 40 milliGRAM(s) Oral before breakfast  senna 2 Tablet(s) Oral at bedtime  sertraline 200 milliGRAM(s) Oral daily  simvastatin 20 milliGRAM(s) Oral at bedtime      PAST MEDICAL & SURGICAL HISTORY:  HTN (hypertension)  CHF (congestive heart failure)  T2DM (type 2 diabetes mellitus)  HLD (hyperlipidemia)  No significant past surgical history      Vitals:  T(F): 98.2 (11-15), Max: 100.8 (11-14)  HR: 58 (11-15) (58 - 74)  BP: 129/61 (11-15) (129/61 - 170/67)  RR: 18 (11-15)  SpO2: 96% (11-15)  I&O's Summary    14 Nov 2018 07:01  -  15 Nov 2018 07:00  --------------------------------------------------------  IN: 290 mL / OUT: 1450 mL / NET: -1160 mL    15 Nov 2018 07:01  -  15 Nov 2018 13:53  --------------------------------------------------------  IN: 240 mL / OUT: 425 mL / NET: -185 mL        Physical Exam:  Appearance: No acute distress; well appearing  Eyes: PERRL, EOMI, pink conjunctiva  HENT: Normal oral muscosa  Cardiovascular: RRR, S1, S2, no murmurs, rubs, or gallops; no edema; no JVD  Respiratory: Clear to auscultation bilaterally  Gastrointestinal: soft, non-tender, non-distended with normal bowel sounds  Musculoskeletal: No clubbing; no joint deformity   Neurologic: Non-focal  Lymphatic: No lymphadenopathy  Psychiatry: AAOx3, mood & affect appropriate  Skin: No rashes, ecchymoses, or cyanosis                          8.6    4.01  )-----------( 123      ( 15 Nov 2018 07:56 )             27.1     11-15    139  |  98  |  55<H>  ----------------------------<  155<H>  3.3<L>   |  28  |  2.44<H>    Ca    9.5      15 Nov 2018 06:20    TPro  6.7  /  Alb  3.7  /  TBili  x   /  DBili  x   /  AST  x   /  ALT  x   /  AlkPhos  x   11-14          Serum Pro-Brain Natriuretic Peptide: 4122 pg/mL (11-09 @ 22:34)          New ECG(s): Personally reviewed    Echo:    Stress Testing:     Cath:    Imaging:    Interpretation of Telemetry: Overnight Events:   Febrile overnight, no CP or SOB.     Medications:  acetaminophen   Tablet .. 650 milliGRAM(s) Oral every 6 hours PRN  aspirin enteric coated 81 milliGRAM(s) Oral daily  buDESOnide 160 MICROgram(s)/formoterol 4.5 MICROgram(s) Inhaler 2 Puff(s) Inhalation two times a day  clopidogrel Tablet 75 milliGRAM(s) Oral daily  dextrose 40% Gel 15 Gram(s) Oral once PRN  dextrose 5%. 1000 milliLiter(s) IV Continuous <Continuous>  dextrose 50% Injectable 12.5 Gram(s) IV Push once  docusate sodium 100 milliGRAM(s) Oral three times a day  doxazosin 2 milliGRAM(s) Oral at bedtime  furosemide    Tablet 40 milliGRAM(s) Oral two times a day  heparin  Injectable 5000 Unit(s) SubCutaneous every 8 hours  hydrALAZINE 100 milliGRAM(s) Oral every 8 hours PRN  influenza   Vaccine 0.5 milliLiter(s) IntraMuscular once  insulin lispro (HumaLOG) corrective regimen sliding scale   SubCutaneous three times a day before meals  insulin lispro (HumaLOG) corrective regimen sliding scale   SubCutaneous at bedtime  methylPREDNISolone sodium succinate Injectable 40 milliGRAM(s) IV Push daily  metoprolol tartrate 50 milliGRAM(s) Oral two times a day  NIFEdipine XL 90 milliGRAM(s) Oral daily  oxyCODONE    IR 5 milliGRAM(s) Oral every 4 hours PRN  pantoprazole    Tablet 40 milliGRAM(s) Oral before breakfast  senna 2 Tablet(s) Oral at bedtime  sertraline 200 milliGRAM(s) Oral daily  simvastatin 20 milliGRAM(s) Oral at bedtime      PAST MEDICAL & SURGICAL HISTORY:  HTN (hypertension)  CHF (congestive heart failure)  T2DM (type 2 diabetes mellitus)  HLD (hyperlipidemia)  No significant past surgical history      Vitals:  T(F): 98.2 (11-15), Max: 100.8 (11-14)  HR: 58 (11-15) (58 - 74)  BP: 129/61 (11-15) (129/61 - 170/67)  RR: 18 (11-15)  SpO2: 96% (11-15)  I&O's Summary    14 Nov 2018 07:01  -  15 Nov 2018 07:00  --------------------------------------------------------  IN: 290 mL / OUT: 1450 mL / NET: -1160 mL    15 Nov 2018 07:01  -  15 Nov 2018 13:53  --------------------------------------------------------  IN: 240 mL / OUT: 425 mL / NET: -185 mL        Physical Exam:  Appearance: No acute distress; well appearing  Eyes: PERRL, EOMI, pink conjunctiva  HENT: Normal oral muscosa  Cardiovascular: RRR, S1, S2, no murmurs, rubs, or gallops; no edema; no JVD  Respiratory: Clear to auscultation bilaterally  Gastrointestinal: soft, non-tender, non-distended with normal bowel sounds  Musculoskeletal: No clubbing; no joint deformity   Neurologic: Non-focal  Lymphatic: No lymphadenopathy  Psychiatry: AAOx3, mood & affect appropriate  Skin: No rashes, ecchymoses, or cyanosis                          8.6    4.01  )-----------( 123      ( 15 Nov 2018 07:56 )             27.1     11-15    139  |  98  |  55<H>  ----------------------------<  155<H>  3.3<L>   |  28  |  2.44<H>    Ca    9.5      15 Nov 2018 06:20    TPro  6.7  /  Alb  3.7  /  TBili  x   /  DBili  x   /  AST  x   /  ALT  x   /  AlkPhos  x   11-14          Serum Pro-Brain Natriuretic Peptide: 4122 pg/mL (11-09 @ 22:34)          New ECG(s): Personally reviewed    Echo:    Stress Testing:     Cath:    Imaging:    Interpretation of Telemetry:

## 2018-11-16 DIAGNOSIS — I34.0 NONRHEUMATIC MITRAL (VALVE) INSUFFICIENCY: ICD-10-CM

## 2018-11-16 LAB
-  AMPICILLIN: SIGNIFICANT CHANGE UP
-  CIPROFLOXACIN: SIGNIFICANT CHANGE UP
-  LEVOFLOXACIN: SIGNIFICANT CHANGE UP
-  NITROFURANTOIN: SIGNIFICANT CHANGE UP
-  TETRACYCLINE: SIGNIFICANT CHANGE UP
-  VANCOMYCIN: SIGNIFICANT CHANGE UP
ANION GAP SERPL CALC-SCNC: 17 MMOL/L — SIGNIFICANT CHANGE UP (ref 5–17)
BLD GP AB SCN SERPL QL: NEGATIVE — SIGNIFICANT CHANGE UP
BUN SERPL-MCNC: 64 MG/DL — HIGH (ref 7–23)
CALCIUM SERPL-MCNC: 9.5 MG/DL — SIGNIFICANT CHANGE UP (ref 8.4–10.5)
CHLORIDE SERPL-SCNC: 100 MMOL/L — SIGNIFICANT CHANGE UP (ref 96–108)
CO2 SERPL-SCNC: 26 MMOL/L — SIGNIFICANT CHANGE UP (ref 22–31)
CREAT SERPL-MCNC: 2.43 MG/DL — HIGH (ref 0.5–1.3)
CULTURE RESULTS: SIGNIFICANT CHANGE UP
DAT POLY-SP REAG RBC QL: NEGATIVE — SIGNIFICANT CHANGE UP
GLUCOSE BLDC GLUCOMTR-MCNC: 163 MG/DL — HIGH (ref 70–99)
GLUCOSE BLDC GLUCOMTR-MCNC: 175 MG/DL — HIGH (ref 70–99)
GLUCOSE BLDC GLUCOMTR-MCNC: 211 MG/DL — HIGH (ref 70–99)
GLUCOSE BLDC GLUCOMTR-MCNC: 263 MG/DL — HIGH (ref 70–99)
GLUCOSE SERPL-MCNC: 148 MG/DL — HIGH (ref 70–99)
HAPTOGLOB SERPL-MCNC: 181 MG/DL — SIGNIFICANT CHANGE UP (ref 34–200)
HCT VFR BLD CALC: 26.4 % — LOW (ref 39–50)
HGB BLD-MCNC: 8.7 G/DL — LOW (ref 13–17)
IRON SATN MFR SERPL: 11 % — LOW (ref 16–55)
IRON SATN MFR SERPL: 24 UG/DL — LOW (ref 45–165)
LDH SERPL L TO P-CCNC: 171 U/L — SIGNIFICANT CHANGE UP (ref 50–242)
MCHC RBC-ENTMCNC: 29.8 PG — SIGNIFICANT CHANGE UP (ref 27–34)
MCHC RBC-ENTMCNC: 33 GM/DL — SIGNIFICANT CHANGE UP (ref 32–36)
MCV RBC AUTO: 90.4 FL — SIGNIFICANT CHANGE UP (ref 80–100)
METHOD TYPE: SIGNIFICANT CHANGE UP
ORGANISM # SPEC MICROSCOPIC CNT: SIGNIFICANT CHANGE UP
ORGANISM # SPEC MICROSCOPIC CNT: SIGNIFICANT CHANGE UP
PLATELET # BLD AUTO: 129 K/UL — LOW (ref 150–400)
POTASSIUM SERPL-MCNC: 3.4 MMOL/L — LOW (ref 3.5–5.3)
POTASSIUM SERPL-SCNC: 3.4 MMOL/L — LOW (ref 3.5–5.3)
RBC # BLD: 2.92 M/UL — LOW (ref 4.2–5.8)
RBC # FLD: 14.6 % — HIGH (ref 10.3–14.5)
RH IG SCN BLD-IMP: POSITIVE — SIGNIFICANT CHANGE UP
SODIUM SERPL-SCNC: 143 MMOL/L — SIGNIFICANT CHANGE UP (ref 135–145)
SPECIMEN SOURCE: SIGNIFICANT CHANGE UP
TIBC SERPL-MCNC: 221 UG/DL — SIGNIFICANT CHANGE UP (ref 220–430)
UIBC SERPL-MCNC: 197 UG/DL — SIGNIFICANT CHANGE UP (ref 110–370)
WBC # BLD: 4.19 K/UL — SIGNIFICANT CHANGE UP (ref 3.8–10.5)
WBC # FLD AUTO: 4.19 K/UL — SIGNIFICANT CHANGE UP (ref 3.8–10.5)

## 2018-11-16 PROCEDURE — 99232 SBSQ HOSP IP/OBS MODERATE 35: CPT | Mod: GC

## 2018-11-16 PROCEDURE — 99231 SBSQ HOSP IP/OBS SF/LOW 25: CPT | Mod: GC

## 2018-11-16 PROCEDURE — 99232 SBSQ HOSP IP/OBS MODERATE 35: CPT

## 2018-11-16 PROCEDURE — 72070 X-RAY EXAM THORAC SPINE 2VWS: CPT | Mod: 26

## 2018-11-16 PROCEDURE — 99233 SBSQ HOSP IP/OBS HIGH 50: CPT

## 2018-11-16 PROCEDURE — 72100 X-RAY EXAM L-S SPINE 2/3 VWS: CPT | Mod: 26

## 2018-11-16 PROCEDURE — 93306 TTE W/DOPPLER COMPLETE: CPT | Mod: 26

## 2018-11-16 PROCEDURE — 73522 X-RAY EXAM HIPS BI 3-4 VIEWS: CPT | Mod: 26

## 2018-11-16 PROCEDURE — 93312 ECHO TRANSESOPHAGEAL: CPT | Mod: 26

## 2018-11-16 RX ORDER — INSULIN GLARGINE 100 [IU]/ML
5 INJECTION, SOLUTION SUBCUTANEOUS AT BEDTIME
Qty: 0 | Refills: 0 | Status: DISCONTINUED | OUTPATIENT
Start: 2018-11-16 | End: 2018-11-20

## 2018-11-16 RX ORDER — POTASSIUM CHLORIDE 20 MEQ
10 PACKET (EA) ORAL ONCE
Qty: 0 | Refills: 0 | Status: COMPLETED | OUTPATIENT
Start: 2018-11-16 | End: 2018-11-16

## 2018-11-16 RX ADMIN — HEPARIN SODIUM 5000 UNIT(S): 5000 INJECTION INTRAVENOUS; SUBCUTANEOUS at 13:54

## 2018-11-16 RX ADMIN — BUDESONIDE AND FORMOTEROL FUMARATE DIHYDRATE 2 PUFF(S): 160; 4.5 AEROSOL RESPIRATORY (INHALATION) at 05:25

## 2018-11-16 RX ADMIN — Medication 2 MILLIGRAM(S): at 21:38

## 2018-11-16 RX ADMIN — Medication 100 MILLIGRAM(S): at 13:54

## 2018-11-16 RX ADMIN — Medication 100 MILLIGRAM(S): at 21:39

## 2018-11-16 RX ADMIN — OXYCODONE HYDROCHLORIDE 5 MILLIGRAM(S): 5 TABLET ORAL at 21:43

## 2018-11-16 RX ADMIN — Medication 50 MILLIGRAM(S): at 17:27

## 2018-11-16 RX ADMIN — Medication 81 MILLIGRAM(S): at 12:19

## 2018-11-16 RX ADMIN — SENNA PLUS 2 TABLET(S): 8.6 TABLET ORAL at 21:38

## 2018-11-16 RX ADMIN — BUDESONIDE AND FORMOTEROL FUMARATE DIHYDRATE 2 PUFF(S): 160; 4.5 AEROSOL RESPIRATORY (INHALATION) at 17:27

## 2018-11-16 RX ADMIN — Medication 100 MILLIGRAM(S): at 05:25

## 2018-11-16 RX ADMIN — Medication 40 MILLIGRAM(S): at 05:25

## 2018-11-16 RX ADMIN — CLOPIDOGREL BISULFATE 75 MILLIGRAM(S): 75 TABLET, FILM COATED ORAL at 12:19

## 2018-11-16 RX ADMIN — Medication 10 MILLIEQUIVALENT(S): at 17:51

## 2018-11-16 RX ADMIN — Medication 3: at 16:35

## 2018-11-16 RX ADMIN — SERTRALINE 200 MILLIGRAM(S): 25 TABLET, FILM COATED ORAL at 12:19

## 2018-11-16 RX ADMIN — HEPARIN SODIUM 5000 UNIT(S): 5000 INJECTION INTRAVENOUS; SUBCUTANEOUS at 05:25

## 2018-11-16 RX ADMIN — Medication 2: at 12:17

## 2018-11-16 RX ADMIN — Medication 90 MILLIGRAM(S): at 05:26

## 2018-11-16 RX ADMIN — INSULIN GLARGINE 5 UNIT(S): 100 INJECTION, SOLUTION SUBCUTANEOUS at 21:43

## 2018-11-16 RX ADMIN — SIMVASTATIN 20 MILLIGRAM(S): 20 TABLET, FILM COATED ORAL at 21:38

## 2018-11-16 RX ADMIN — Medication 1: at 08:05

## 2018-11-16 RX ADMIN — Medication 40 MILLIGRAM(S): at 17:27

## 2018-11-16 RX ADMIN — HEPARIN SODIUM 5000 UNIT(S): 5000 INJECTION INTRAVENOUS; SUBCUTANEOUS at 21:38

## 2018-11-16 RX ADMIN — OXYCODONE HYDROCHLORIDE 5 MILLIGRAM(S): 5 TABLET ORAL at 22:30

## 2018-11-16 RX ADMIN — Medication 50 MILLIGRAM(S): at 05:26

## 2018-11-16 NOTE — PROGRESS NOTE ADULT - SUBJECTIVE AND OBJECTIVE BOX
Patient is a 83y old  Male who presents with a chief complaint of respiratory distress, SOB, chest pain (2018 08:37)        SUBJECTIVE / OVERNIGHT EVENTS:      MEDICATIONS  (STANDING):  aspirin enteric coated 81 milliGRAM(s) Oral daily  buDESOnide 160 MICROgram(s)/formoterol 4.5 MICROgram(s) Inhaler 2 Puff(s) Inhalation two times a day  clopidogrel Tablet 75 milliGRAM(s) Oral daily  dextrose 5%. 1000 milliLiter(s) (50 mL/Hr) IV Continuous <Continuous>  dextrose 50% Injectable 12.5 Gram(s) IV Push once  docusate sodium 100 milliGRAM(s) Oral three times a day  doxazosin 2 milliGRAM(s) Oral at bedtime  furosemide    Tablet 40 milliGRAM(s) Oral two times a day  heparin  Injectable 5000 Unit(s) SubCutaneous every 8 hours  influenza   Vaccine 0.5 milliLiter(s) IntraMuscular once  insulin lispro (HumaLOG) corrective regimen sliding scale   SubCutaneous three times a day before meals  insulin lispro (HumaLOG) corrective regimen sliding scale   SubCutaneous at bedtime  methylPREDNISolone sodium succinate Injectable 40 milliGRAM(s) IV Push daily  metoprolol tartrate 50 milliGRAM(s) Oral two times a day  NIFEdipine XL 90 milliGRAM(s) Oral daily  pantoprazole    Tablet 40 milliGRAM(s) Oral before breakfast  senna 2 Tablet(s) Oral at bedtime  sertraline 200 milliGRAM(s) Oral daily  simvastatin 20 milliGRAM(s) Oral at bedtime    MEDICATIONS  (PRN):  acetaminophen   Tablet .. 650 milliGRAM(s) Oral every 6 hours PRN Temp greater or equal to 38C (100.4F)  dextrose 40% Gel 15 Gram(s) Oral once PRN Blood Glucose LESS THAN 70 milliGRAM(s)/deciliter  hydrALAZINE 100 milliGRAM(s) Oral every 8 hours PRN Systolic blood pressure > 170  oxyCODONE    IR 5 milliGRAM(s) Oral every 4 hours PRN Severe Pain (7 - 10)      Vital Signs Last 24 Hrs  T(C): 36.5 (2018 11:37), Max: 36.8 (15 Nov 2018 23:59)  T(F): 97.7 (2018 11:37), Max: 98.3 (2018 03:47)  HR: 61 (2018 11:37) (58 - 77)  BP: 121/56 (2018 11:37) (121/56 - 164/81)  BP(mean): --  RR: 19 (2018 11:37) (18 - 19)  SpO2: 97% (2018 11:37) (97% - 99%)  CAPILLARY BLOOD GLUCOSE      POCT Blood Glucose.: 211 mg/dL (2018 12:05)  POCT Blood Glucose.: 175 mg/dL (2018 07:53)  POCT Blood Glucose.: 272 mg/dL (15 Nov 2018 21:12)  POCT Blood Glucose.: 170 mg/dL (15 Nov 2018 16:42)    I&O's Summary    15 Nov 2018 07:  -  2018 07:00  --------------------------------------------------------  IN: 410 mL / OUT: 1775 mL / NET: -1365 mL    2018 07:01  -  2018 12:40  --------------------------------------------------------  IN: 0 mL / OUT: 500 mL / NET: -500 mL          PHYSICAL EXAM  GENERAL: NAD, well-developed  HEAD:  Atraumatic, Normocephalic  EYES: EOMI, PERRLA, conjunctiva and sclera clear  NECK: Supple, No JVD  CHEST/LUNG: Clear to auscultation bilaterally; No wheeze  HEART: Regular rate and rhythm; No murmurs, rubs, or gallops  ABDOMEN: Soft, Nontender, Nondistended; Bowel sounds present  EXTREMITIES:  2+ Peripheral Pulses, No clubbing, cyanosis, or edema  PSYCH: AAOx3  SKIN: No rashes or lesions    LABS:                        8.7    4.19  )-----------( 129      ( 2018 07:42 )             26.4     11-16    143  |  100  |  64<H>  ----------------------------<  148<H>  3.4<L>   |  26  |  2.43<H>    Ca    9.5      2018 06:12            Urinalysis Basic - ( 2018 12:56 )    Color: Light Yellow / Appearance: Clear / S.016 / pH: x  Gluc: x / Ketone: Negative  / Bili: Negative / Urobili: Negative   Blood: x / Protein: 30 mg/dL / Nitrite: Negative   Leuk Esterase: Negative / RBC: 4 /hpf / WBC 1 /hpf   Sq Epi: x / Non Sq Epi: 0 /hpf / Bacteria: Negative        Culture - Urine (collected 2018 15:11)  Source: .Urine Clean Catch (Midstream)  Preliminary Report (15 Nov 2018 12:58):    10,000 - 49,000 CFU/mL Enterococcus faecalis    Culture - Blood (collected 2018 02:45)  Source: .Blood Blood  Preliminary Report (15 Nov 2018 03:03):    No growth to date.    Culture - Blood (collected 2018 02:42)  Source: .Blood Blood  Preliminary Report (15 Nov 2018 03:03):    No growth to date.        RADIOLOGY & ADDITIONAL TESTS:    Imaging Personally Reviewed:  Consultant(s) Notes Reviewed:    Care Discussed with Consultants/Other Providers: Patient is a 83y old  Male who presents with a chief complaint of respiratory distress, SOB, chest pain (2018 08:37)        SUBJECTIVE / OVERNIGHT EVENTS:      MEDICATIONS  (STANDING):  aspirin enteric coated 81 milliGRAM(s) Oral daily  buDESOnide 160 MICROgram(s)/formoterol 4.5 MICROgram(s) Inhaler 2 Puff(s) Inhalation two times a day  clopidogrel Tablet 75 milliGRAM(s) Oral daily  dextrose 5%. 1000 milliLiter(s) (50 mL/Hr) IV Continuous <Continuous>  dextrose 50% Injectable 12.5 Gram(s) IV Push once  docusate sodium 100 milliGRAM(s) Oral three times a day  doxazosin 2 milliGRAM(s) Oral at bedtime  furosemide    Tablet 40 milliGRAM(s) Oral two times a day  heparin  Injectable 5000 Unit(s) SubCutaneous every 8 hours  influenza   Vaccine 0.5 milliLiter(s) IntraMuscular once  insulin lispro (HumaLOG) corrective regimen sliding scale   SubCutaneous three times a day before meals  insulin lispro (HumaLOG) corrective regimen sliding scale   SubCutaneous at bedtime  methylPREDNISolone sodium succinate Injectable 40 milliGRAM(s) IV Push daily  metoprolol tartrate 50 milliGRAM(s) Oral two times a day  NIFEdipine XL 90 milliGRAM(s) Oral daily  pantoprazole    Tablet 40 milliGRAM(s) Oral before breakfast  senna 2 Tablet(s) Oral at bedtime  sertraline 200 milliGRAM(s) Oral daily  simvastatin 20 milliGRAM(s) Oral at bedtime    MEDICATIONS  (PRN):  acetaminophen   Tablet .. 650 milliGRAM(s) Oral every 6 hours PRN Temp greater or equal to 38C (100.4F)  dextrose 40% Gel 15 Gram(s) Oral once PRN Blood Glucose LESS THAN 70 milliGRAM(s)/deciliter  hydrALAZINE 100 milliGRAM(s) Oral every 8 hours PRN Systolic blood pressure > 170  oxyCODONE    IR 5 milliGRAM(s) Oral every 4 hours PRN Severe Pain (7 - 10)      Vital Signs Last 24 Hrs  T(C): 36.5 (2018 11:37), Max: 36.8 (15 Nov 2018 23:59)  T(F): 97.7 (2018 11:37), Max: 98.3 (2018 03:47)  HR: 61 (2018 11:37) (58 - 77)  BP: 121/56 (2018 11:37) (121/56 - 164/81)  BP(mean): --  RR: 19 (2018 11:37) (18 - 19)  SpO2: 97% (2018 11:37) (97% - 99%)  CAPILLARY BLOOD GLUCOSE      POCT Blood Glucose.: 211 mg/dL (2018 12:05)  POCT Blood Glucose.: 175 mg/dL (2018 07:53)  POCT Blood Glucose.: 272 mg/dL (15 Nov 2018 21:12)  POCT Blood Glucose.: 170 mg/dL (15 Nov 2018 16:42)    I&O's Summary    15 Nov 2018 07:  -  2018 07:00  --------------------------------------------------------  IN: 410 mL / OUT: 1775 mL / NET: -1365 mL    2018 07:01  -  2018 12:40  --------------------------------------------------------  IN: 0 mL / OUT: 500 mL / NET: -500 mL        PHYSICAL EXAM  GENERAL: NAD,   HEAD:  Atraumatic, Normocephalic  EYES: EOMI, conjunctiva and sclera clear  NECK: Supple, No JVD  CHEST/LUNG: bibasilar crackles.; No wheeze  HEART: Regular rate and rhythm; systolic murmur  ABDOMEN: Soft, Nontender, Nondistended; Bowel sounds present  EXTREMITIES: Left ankle minimal tenderness,. no swelling or erythema.   PSYCH: AAOx3  SKIN: No rashes or lesions    LABS:                        8.7    4.19  )-----------( 129      ( 2018 07:42 )             26.4     11-16    143  |  100  |  64<H>  ----------------------------<  148<H>  3.4<L>   |  26  |  2.43<H>    Ca    9.5      2018 06:12            Urinalysis Basic - ( 2018 12:56 )    Color: Light Yellow / Appearance: Clear / S.016 / pH: x  Gluc: x / Ketone: Negative  / Bili: Negative / Urobili: Negative   Blood: x / Protein: 30 mg/dL / Nitrite: Negative   Leuk Esterase: Negative / RBC: 4 /hpf / WBC 1 /hpf   Sq Epi: x / Non Sq Epi: 0 /hpf / Bacteria: Negative        Culture - Urine (collected 2018 15:11)  Source: .Urine Clean Catch (Midstream)  Preliminary Report (15 Nov 2018 12:58):    10,000 - 49,000 CFU/mL Enterococcus faecalis    Culture - Blood (collected 2018 02:45)  Source: .Blood Blood  Preliminary Report (15 Nov 2018 03:03):    No growth to date.    Culture - Blood (collected 2018 02:42)  Source: .Blood Blood  Preliminary Report (15 Nov 2018 03:03):    No growth to date.        RADIOLOGY & ADDITIONAL TESTS:    Imaging Personally Reviewed:  Consultant(s) Notes Reviewed:    Care Discussed with Consultants/Other Providers: Patient is a 83y old  Male who presents with a chief complaint of respiratory distress, SOB, chest pain (2018 08:37)        SUBJECTIVE / OVERNIGHT EVENTS:  Feels better. some mild pain from right ankle.   No chest pain or SOB,. s/p MARBIN this am.     MEDICATIONS  (STANDING):  aspirin enteric coated 81 milliGRAM(s) Oral daily  buDESOnide 160 MICROgram(s)/formoterol 4.5 MICROgram(s) Inhaler 2 Puff(s) Inhalation two times a day  clopidogrel Tablet 75 milliGRAM(s) Oral daily  dextrose 5%. 1000 milliLiter(s) (50 mL/Hr) IV Continuous <Continuous>  dextrose 50% Injectable 12.5 Gram(s) IV Push once  docusate sodium 100 milliGRAM(s) Oral three times a day  doxazosin 2 milliGRAM(s) Oral at bedtime  furosemide    Tablet 40 milliGRAM(s) Oral two times a day  heparin  Injectable 5000 Unit(s) SubCutaneous every 8 hours  influenza   Vaccine 0.5 milliLiter(s) IntraMuscular once  insulin lispro (HumaLOG) corrective regimen sliding scale   SubCutaneous three times a day before meals  insulin lispro (HumaLOG) corrective regimen sliding scale   SubCutaneous at bedtime  methylPREDNISolone sodium succinate Injectable 40 milliGRAM(s) IV Push daily  metoprolol tartrate 50 milliGRAM(s) Oral two times a day  NIFEdipine XL 90 milliGRAM(s) Oral daily  pantoprazole    Tablet 40 milliGRAM(s) Oral before breakfast  senna 2 Tablet(s) Oral at bedtime  sertraline 200 milliGRAM(s) Oral daily  simvastatin 20 milliGRAM(s) Oral at bedtime    MEDICATIONS  (PRN):  acetaminophen   Tablet .. 650 milliGRAM(s) Oral every 6 hours PRN Temp greater or equal to 38C (100.4F)  dextrose 40% Gel 15 Gram(s) Oral once PRN Blood Glucose LESS THAN 70 milliGRAM(s)/deciliter  hydrALAZINE 100 milliGRAM(s) Oral every 8 hours PRN Systolic blood pressure > 170  oxyCODONE    IR 5 milliGRAM(s) Oral every 4 hours PRN Severe Pain (7 - 10)      Vital Signs Last 24 Hrs  T(C): 36.5 (2018 11:37), Max: 36.8 (15 Nov 2018 23:59)  T(F): 97.7 (2018 11:37), Max: 98.3 (2018 03:47)  HR: 61 (2018 11:37) (58 - 77)  BP: 121/56 (2018 11:37) (121/56 - 164/81)  BP(mean): --  RR: 19 (2018 11:37) (18 - 19)  SpO2: 97% (2018 11:37) (97% - 99%)  CAPILLARY BLOOD GLUCOSE      POCT Blood Glucose.: 211 mg/dL (2018 12:05)  POCT Blood Glucose.: 175 mg/dL (2018 07:53)  POCT Blood Glucose.: 272 mg/dL (15 Nov 2018 21:12)  POCT Blood Glucose.: 170 mg/dL (15 Nov 2018 16:42)    I&O's Summary    15 Nov 2018 07:01  -  2018 07:00  --------------------------------------------------------  IN: 410 mL / OUT: 1775 mL / NET: -1365 mL    2018 07:01  -  2018 12:40  --------------------------------------------------------  IN: 0 mL / OUT: 500 mL / NET: -500 mL        PHYSICAL EXAM  GENERAL: NAD,   HEAD:  Atraumatic, Normocephalic  EYES: EOMI, conjunctiva and sclera clear  NECK: Supple, No JVD  CHEST/LUNG: bibasilar crackles.; No wheeze  HEART: Regular rate and rhythm; systolic murmur  ABDOMEN: Soft, Nontender, Nondistended; Bowel sounds present  EXTREMITIES: Left ankle minimal tenderness,. no swelling or erythema.   PSYCH: AAOx3  SKIN: No rashes or lesions    LABS:                        8.7    4.19  )-----------( 129      ( 2018 07:42 )             26.4     11-16    143  |  100  |  64<H>  ----------------------------<  148<H>  3.4<L>   |  26  |  2.43<H>    Ca    9.5      2018 06:12            Urinalysis Basic - ( 2018 12:56 )    Color: Light Yellow / Appearance: Clear / S.016 / pH: x  Gluc: x / Ketone: Negative  / Bili: Negative / Urobili: Negative   Blood: x / Protein: 30 mg/dL / Nitrite: Negative   Leuk Esterase: Negative / RBC: 4 /hpf / WBC 1 /hpf   Sq Epi: x / Non Sq Epi: 0 /hpf / Bacteria: Negative        Culture - Urine (collected 2018 15:11)  Source: .Urine Clean Catch (Midstream)  Preliminary Report (15 Nov 2018 12:58):    10,000 - 49,000 CFU/mL Enterococcus faecalis    Culture - Blood (collected 2018 02:45)  Source: .Blood Blood  Preliminary Report (15 Nov 2018 03:03):    No growth to date.    Culture - Blood (collected 2018 02:42)  Source: .Blood Blood  Preliminary Report (15 Nov 2018 03:03):    No growth to date.        RADIOLOGY & ADDITIONAL TESTS:    Imaging Personally Reviewed:  Consultant(s) Notes Reviewed:    Care Discussed with Consultants/Other Providers:

## 2018-11-16 NOTE — PROGRESS NOTE ADULT - SUBJECTIVE AND OBJECTIVE BOX
JANE VILLEDA  [id number]    INTERVAL HPI/OVERNIGHT EVENTS:    States ankle pain has improved mildly  RN states pt walked to the bathroom wo any complaints of joint pain  vs yesterday pt has ankle pain immediately upon rising up for PT session    MEDICATIONS  (STANDING):  aspirin enteric coated 81 milliGRAM(s) Oral daily  buDESOnide 160 MICROgram(s)/formoterol 4.5 MICROgram(s) Inhaler 2 Puff(s) Inhalation two times a day  clopidogrel Tablet 75 milliGRAM(s) Oral daily  dextrose 5%. 1000 milliLiter(s) (50 mL/Hr) IV Continuous <Continuous>  dextrose 50% Injectable 12.5 Gram(s) IV Push once  docusate sodium 100 milliGRAM(s) Oral three times a day  doxazosin 2 milliGRAM(s) Oral at bedtime  furosemide    Tablet 40 milliGRAM(s) Oral two times a day  heparin  Injectable 5000 Unit(s) SubCutaneous every 8 hours  influenza   Vaccine 0.5 milliLiter(s) IntraMuscular once  insulin glargine Injectable (LANTUS) 5 Unit(s) SubCutaneous at bedtime  insulin lispro (HumaLOG) corrective regimen sliding scale   SubCutaneous three times a day before meals  insulin lispro (HumaLOG) corrective regimen sliding scale   SubCutaneous at bedtime  methylPREDNISolone sodium succinate Injectable 40 milliGRAM(s) IV Push daily  metoprolol tartrate 50 milliGRAM(s) Oral two times a day  NIFEdipine XL 90 milliGRAM(s) Oral daily  pantoprazole    Tablet 40 milliGRAM(s) Oral before breakfast  potassium chloride    Tablet ER 10 milliEquivalent(s) Oral once  senna 2 Tablet(s) Oral at bedtime  sertraline 200 milliGRAM(s) Oral daily  simvastatin 20 milliGRAM(s) Oral at bedtime    MEDICATIONS  (PRN):  acetaminophen   Tablet .. 650 milliGRAM(s) Oral every 6 hours PRN Temp greater or equal to 38C (100.4F)  dextrose 40% Gel 15 Gram(s) Oral once PRN Blood Glucose LESS THAN 70 milliGRAM(s)/deciliter  hydrALAZINE 100 milliGRAM(s) Oral every 8 hours PRN Systolic blood pressure > 170  oxyCODONE    IR 5 milliGRAM(s) Oral every 4 hours PRN Severe Pain (7 - 10)      Allergies    No Known Allergies    Intolerances      Vital Signs Last 24 Hrs  T(C): 36.5 (16 Nov 2018 11:37), Max: 36.8 (15 Nov 2018 23:59)  T(F): 97.7 (16 Nov 2018 11:37), Max: 98.3 (16 Nov 2018 03:47)  HR: 70 (16 Nov 2018 16:43) (61 - 77)  BP: 138/53 (16 Nov 2018 16:43) (121/56 - 164/81)  BP(mean): --  RR: 19 (16 Nov 2018 11:37) (18 - 19)  SpO2: 97% (16 Nov 2018 11:37) (97% - 99%)    Physical Exam:  General: No apparent distress  MSK: right ankle and first MTP with improved erythema, swelling however still TTP   ankle ROM present but with moderate pain with movement  No other joint abnormality appreciated    LABS:                        8.7    4.19  )-----------( 129      ( 16 Nov 2018 07:42 )             26.4     11-16    143  |  100  |  64<H>  ----------------------------<  148<H>  3.4<L>   |  26  |  2.43<H>    Ca    9.5      16 Nov 2018 06:12      RADIOLOGY & ADDITIONAL TESTS:

## 2018-11-16 NOTE — PROGRESS NOTE ADULT - SUBJECTIVE AND OBJECTIVE BOX
NEPHROLOGY PROGRESS NOTE    Resting, denies CP, + FLOREZ    Vital Signs Last 24 Hrs  T(C): 36.5 (11-16-18 @ 11:37), Max: 36.8 (11-15-18 @ 23:59)  T(F): 97.7 (11-16-18 @ 11:37), Max: 98.3 (11-16-18 @ 03:47)  HR: 61 (11-16-18 @ 11:37) (59 - 77)  BP: 121/56 (11-16-18 @ 11:37) (121/56 - 164/81)  RR: 19 (11-16-18 @ 11:37) (18 - 19)  SpO2: 97% (11-16-18 @ 11:37) (97% - 99%)    Lungs - decr BS b/l bases  Heart - S1S2  Abd - soft, NT,  ND, + BS  Extr - sm edema                                              8.7    4.19  )-----------( 129      ( 16 Nov 2018 07:42 )             26.4     16 Nov 2018 06:12    143    |  100    |  64     ----------------------------<  148    3.4     |  26     |  2.43     Ca    9.5        16 Nov 2018 06:12    acetaminophen   Tablet .. 650 milliGRAM(s) Oral every 6 hours PRN  aspirin enteric coated 81 milliGRAM(s) Oral daily  buDESOnide 160 MICROgram(s)/formoterol 4.5 MICROgram(s) Inhaler 2 Puff(s) Inhalation two times a day  clopidogrel Tablet 75 milliGRAM(s) Oral daily  dextrose 40% Gel 15 Gram(s) Oral once PRN  dextrose 5%. 1000 milliLiter(s) IV Continuous <Continuous>  dextrose 50% Injectable 12.5 Gram(s) IV Push once  docusate sodium 100 milliGRAM(s) Oral three times a day  doxazosin 2 milliGRAM(s) Oral at bedtime  furosemide    Tablet 40 milliGRAM(s) Oral two times a day  heparin  Injectable 5000 Unit(s) SubCutaneous every 8 hours  hydrALAZINE 100 milliGRAM(s) Oral every 8 hours PRN  influenza   Vaccine 0.5 milliLiter(s) IntraMuscular once  insulin glargine Injectable (LANTUS) 5 Unit(s) SubCutaneous at bedtime  insulin lispro (HumaLOG) corrective regimen sliding scale   SubCutaneous three times a day before meals  insulin lispro (HumaLOG) corrective regimen sliding scale   SubCutaneous at bedtime  methylPREDNISolone sodium succinate Injectable 40 milliGRAM(s) IV Push daily  metoprolol tartrate 50 milliGRAM(s) Oral two times a day  NIFEdipine XL 90 milliGRAM(s) Oral daily  oxyCODONE    IR 5 milliGRAM(s) Oral every 4 hours PRN  pantoprazole    Tablet 40 milliGRAM(s) Oral before breakfast  senna 2 Tablet(s) Oral at bedtime  sertraline 200 milliGRAM(s) Oral daily  simvastatin 20 milliGRAM(s) Oral at bedtime    A/P:    CM, CHF, severe MR  CKD, baseline Cr unknown  Cr stable  Continue Lasix  Avoid ACE, ARB  SONO w/o hydro, UA negative  BMP daily  Suppl K, check Mg level  Plans for cath noted, pt at moderate risk of RAMÍREZ on CKD  Would hold Lasix the day pre and post cath if possible   Will follow

## 2018-11-16 NOTE — PROGRESS NOTE ADULT - ASSESSMENT
83M, trilingual Filipino/Thai speaking, c hx CAD s/p 6 stents (last stent 6 years ago), CHF, CKD (unknown baseline), DM2, HTN, HLD, chronic R lower back pain and chronic suprapubic pain, former smoker, pw hypoxic resp failure 2/2 pulm edema likely 2/2 CHF exacerbation and poss RAMÍREZ.

## 2018-11-16 NOTE — PROGRESS NOTE ADULT - ASSESSMENT
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 Walls  Rheumatology Fellow  [phone] - for weekend coverage

## 2018-11-16 NOTE — PROGRESS NOTE ADULT - PROBLEM SELECTOR PLAN 1
Suspect due to acute gout flare. low grade fever yesterday again,   Seen by ID no evidence of infection. monitor off abx. ID appreciated.  Started on IV steroid daily. Full rheum eval to follow. elevated ESR/CRP.  Xray showing small amount of joint fluid but no bone abnormalities.   Follow up cultures. Suspect due to acute gout flare. no additional fever overnight  Seen by ID no evidence of infection. monitor off abx. ID appreciated.  Cont on IV steroid daily. Rheum follow up today re: taper. elevated ESR/CRP.  Xray showing small amount of joint fluid but no bone abnormalities.

## 2018-11-16 NOTE — PROGRESS NOTE ADULT - PROBLEM SELECTOR PLAN 2
Suspected gout as above with fever.   Started on steroid daily. Official rheum eval pending  Holding off colchicine as per renal rec. Suspected gout as above with fever.   Started on steroid daily. rheum f/u  Holding off colchicine as per renal rec.

## 2018-11-16 NOTE — PROGRESS NOTE ADULT - SUBJECTIVE AND OBJECTIVE BOX
Overnight Events:     REVIEW OF SYSTEMS:  CONSTITUTIONAL: No weakness, fevers or chills  EYES/ENT: No visual changes;  No dysphagia  NECK: No pain or stiffness  RESPIRATORY: No cough, wheezing, hemoptysis; No shortness of breath  CARDIOVASCULAR: No chest pain or palpitations; No lower extremity edema  GASTROINTESTINAL: No abdominal or epigastric pain. No nausea, vomiting, or hematemesis; No diarrhea or constipation. No melena or hematochezia.  BACK: No back pain  GENITOURINARY: No dysuria, frequency or hematuria  NEUROLOGICAL: No numbness or weakness  SKIN: No itching, burning, rashes, or lesions   All other review of systems is negative unless indicated above.            Medications:  acetaminophen   Tablet .. 650 milliGRAM(s) Oral every 6 hours PRN  aspirin enteric coated 81 milliGRAM(s) Oral daily  buDESOnide 160 MICROgram(s)/formoterol 4.5 MICROgram(s) Inhaler 2 Puff(s) Inhalation two times a day  clopidogrel Tablet 75 milliGRAM(s) Oral daily  dextrose 40% Gel 15 Gram(s) Oral once PRN  dextrose 5%. 1000 milliLiter(s) IV Continuous <Continuous>  dextrose 50% Injectable 12.5 Gram(s) IV Push once  docusate sodium 100 milliGRAM(s) Oral three times a day  doxazosin 2 milliGRAM(s) Oral at bedtime  furosemide    Tablet 40 milliGRAM(s) Oral two times a day  heparin  Injectable 5000 Unit(s) SubCutaneous every 8 hours  hydrALAZINE 100 milliGRAM(s) Oral every 8 hours PRN  influenza   Vaccine 0.5 milliLiter(s) IntraMuscular once  insulin lispro (HumaLOG) corrective regimen sliding scale   SubCutaneous three times a day before meals  insulin lispro (HumaLOG) corrective regimen sliding scale   SubCutaneous at bedtime  methylPREDNISolone sodium succinate Injectable 40 milliGRAM(s) IV Push daily  metoprolol tartrate 50 milliGRAM(s) Oral two times a day  NIFEdipine XL 90 milliGRAM(s) Oral daily  oxyCODONE    IR 5 milliGRAM(s) Oral every 4 hours PRN  pantoprazole    Tablet 40 milliGRAM(s) Oral before breakfast  senna 2 Tablet(s) Oral at bedtime  sertraline 200 milliGRAM(s) Oral daily  simvastatin 20 milliGRAM(s) Oral at bedtime      PAST MEDICAL & SURGICAL HISTORY:  HTN (hypertension)  CHF (congestive heart failure)  T2DM (type 2 diabetes mellitus)  HLD (hyperlipidemia)  No significant past surgical history      Vitals:  T(F): 97.7 (11-16), Max: 98.3 (11-16)  HR: 65 (11-16) (58 - 77)  BP: 160/65 (11-16) (129/61 - 164/81)  RR: 19 (11-16)  SpO2: 97% (11-16)  I&O's Summary    15 Nov 2018 07:01  -  16 Nov 2018 07:00  --------------------------------------------------------  IN: 410 mL / OUT: 1775 mL / NET: -1365 mL    16 Nov 2018 07:01  -  16 Nov 2018 08:37  --------------------------------------------------------  IN: 0 mL / OUT: 250 mL / NET: -250 mL        Physical Exam:  Appearance: No acute distress; well appearing  Eyes: PERRL, EOMI, pink conjunctiva  HENT: Normal oral muscosa  Cardiovascular: RRR, S1, S2, no murmurs, rubs, or gallops; no edema; no JVD  Respiratory: Clear to auscultation bilaterally  Gastrointestinal: soft, non-tender, non-distended with normal bowel sounds  Musculoskeletal: No clubbing; no joint deformity   Neurologic: Non-focal  Lymphatic: No lymphadenopathy  Psychiatry: AAOx3, mood & affect appropriate  Skin: No rashes, ecchymoses, or cyanosis                          8.7    4.19  )-----------( 129      ( 16 Nov 2018 07:42 )             26.4     11-16    143  |  100  |  64<H>  ----------------------------<  148<H>  3.4<L>   |  26  |  2.43<H>    Ca    9.5      16 Nov 2018 06:12            Serum Pro-Brain Natriuretic Peptide: 4122 pg/mL (11-09 @ 22:34)          New ECG(s): Personally reviewed    Echo:    Stress Testing:     Cath:    Imaging:    Interpretation of Telemetry: Overnight Events:   No CP or SOB    REVIEW OF SYSTEMS:  CONSTITUTIONAL: No weakness, fevers or chills  EYES/ENT: No visual changes;  No dysphagia  NECK: No pain or stiffness  RESPIRATORY: No cough, wheezing, hemoptysis; No shortness of breath  CARDIOVASCULAR: No chest pain or palpitations; No lower extremity edema  GASTROINTESTINAL: No abdominal or epigastric pain. No nausea, vomiting, or hematemesis; No diarrhea or constipation. No melena or hematochezia.  BACK: No back pain  GENITOURINARY: No dysuria, frequency or hematuria  NEUROLOGICAL: No numbness or weakness  SKIN: No itching, burning, rashes, or lesions   All other review of systems is negative unless indicated above.            Medications:  acetaminophen   Tablet .. 650 milliGRAM(s) Oral every 6 hours PRN  aspirin enteric coated 81 milliGRAM(s) Oral daily  buDESOnide 160 MICROgram(s)/formoterol 4.5 MICROgram(s) Inhaler 2 Puff(s) Inhalation two times a day  clopidogrel Tablet 75 milliGRAM(s) Oral daily  dextrose 40% Gel 15 Gram(s) Oral once PRN  dextrose 5%. 1000 milliLiter(s) IV Continuous <Continuous>  dextrose 50% Injectable 12.5 Gram(s) IV Push once  docusate sodium 100 milliGRAM(s) Oral three times a day  doxazosin 2 milliGRAM(s) Oral at bedtime  furosemide    Tablet 40 milliGRAM(s) Oral two times a day  heparin  Injectable 5000 Unit(s) SubCutaneous every 8 hours  hydrALAZINE 100 milliGRAM(s) Oral every 8 hours PRN  influenza   Vaccine 0.5 milliLiter(s) IntraMuscular once  insulin lispro (HumaLOG) corrective regimen sliding scale   SubCutaneous three times a day before meals  insulin lispro (HumaLOG) corrective regimen sliding scale   SubCutaneous at bedtime  methylPREDNISolone sodium succinate Injectable 40 milliGRAM(s) IV Push daily  metoprolol tartrate 50 milliGRAM(s) Oral two times a day  NIFEdipine XL 90 milliGRAM(s) Oral daily  oxyCODONE    IR 5 milliGRAM(s) Oral every 4 hours PRN  pantoprazole    Tablet 40 milliGRAM(s) Oral before breakfast  senna 2 Tablet(s) Oral at bedtime  sertraline 200 milliGRAM(s) Oral daily  simvastatin 20 milliGRAM(s) Oral at bedtime      PAST MEDICAL & SURGICAL HISTORY:  HTN (hypertension)  CHF (congestive heart failure)  T2DM (type 2 diabetes mellitus)  HLD (hyperlipidemia)  No significant past surgical history      Vitals:  T(F): 97.7 (11-16), Max: 98.3 (11-16)  HR: 65 (11-16) (58 - 77)  BP: 160/65 (11-16) (129/61 - 164/81)  RR: 19 (11-16)  SpO2: 97% (11-16)  I&O's Summary    15 Nov 2018 07:01  -  16 Nov 2018 07:00  --------------------------------------------------------  IN: 410 mL / OUT: 1775 mL / NET: -1365 mL    16 Nov 2018 07:01  -  16 Nov 2018 08:37  --------------------------------------------------------  IN: 0 mL / OUT: 250 mL / NET: -250 mL        Physical Exam:  Appearance: No acute distress; well appearing  Eyes: PERRL, EOMI, pink conjunctiva  HENT: Normal oral muscosa  Cardiovascular: RRR, S1, S2, III/VI holosytolic murmur at apex  Respiratory: Clear to auscultation bilaterally  Gastrointestinal: soft, non-tender, non-distended with normal bowel sounds  Musculoskeletal: No clubbing; no joint deformity   Neurologic: Non-focal  Lymphatic: No lymphadenopathy  Psychiatry: AAOx3, mood & affect appropriate  Skin: No rashes, ecchymoses, or cyanosis                          8.7    4.19  )-----------( 129      ( 16 Nov 2018 07:42 )             26.4     11-16    143  |  100  |  64<H>  ----------------------------<  148<H>  3.4<L>   |  26  |  2.43<H>    Ca    9.5      16 Nov 2018 06:12            Serum Pro-Brain Natriuretic Peptide: 4122 pg/mL (11-09 @ 22:34)          New ECG(s): Personally reviewed    Echo:    Stress Testing:     Cath:    Imaging:    Interpretation of Telemetry:

## 2018-11-16 NOTE — PROGRESS NOTE ADULT - ASSESSMENT
83M trilingual Mongolian/Sami speaking, c hx CAD s/p 6 stents (last stent 6 years ago), CHF, CKD (unknown baseline), DM2, HTN, HLD, chronic R lower back pain and chronic suprapubic pain, former smoker, presents with 3 days of increasing SOB, leg swelling, and chest pain. Patient diuresed and now appears euvolemic however with severe MR on physical exam and on TTE. Hyperdynamic LVEF. Only had mild to mod MR on previous echo in 2015    -continue lasix PO BID, if Cr does not improve then consider going down to lasix 40mg daily  -increase afterload reducing agents as tolerated (could increase nifedipine to BID). Avoid ACEI/ARB for now  -plan for MARBIN today  -will have structural heart team see patient after MARBIN    Maki Romero MD  Cardiology Fellow PGY-5  [id number]

## 2018-11-16 NOTE — PROGRESS NOTE ADULT - ASSESSMENT
83M, hx CAD s/p 6 stents (last stent 6 years ago), CHF, CKD (unknown baseline), DM2, HTN, HLD, chronic R lower back pain and chronic suprapubic pain, former smoker, presents with 3 days of increasing SOB, leg swelling, and chest pain.  Pt was diagnosed with CHF exacerbation, has been treated for it. Started complaining of rt ankle pain, with fever overnight.   Overall Fever, rt ankle pain, elevated ESR/CRP.   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   U/A negative, pt with no abdominal pain or diarrhea, minimal cough.   Clinically improved.       Plan:   Observe off abx.   Management of crystal arthropathy per Primary  Follow up blood cx results, NTD   Urine cx with enterococcus but in absence of pyuria represents asymptomatic bacteriuria.   Rheum on board.

## 2018-11-16 NOTE — CONSULT NOTE ADULT - PROBLEM SELECTOR RECOMMENDATION 9
Patient being evaluated for MitraClip Procedure. Reviewed MARBIN and TTE, both of which confirm severe MR. I will upload images to [facility] for review. Would recommend keeping patient over the weekend to continue diuresis, and perform Cardiac Catheterization on Monday, which I set up with Dr. GENESIS Smart. We would then likely discharge the patient to home, and bring back for Mitraclip. Discussed this plan with Dr. Ramirez and Dr. Zapata, who agreed with plan. I also made his daughter Mary aware of the plan, for which she understood, and was happy with. Patient being evaluated for MitraClip Procedure. Reviewed MARBIN and TTE, both of which confirm severe MR. I will upload images to [facility] for review. Would recommend keeping patient over the weekend to continue diuresis, and perform Cardiac Catheterization on Monday, which I set up with Dr. GENESIS Smart. We would then likely discharge the patient to home, and bring back for Mitraclip. Discussed this plan with Dr. Ramirez and Dr. Zapata, who agreed with plan. I also made his daughter Mary aware of the plan, for which she understood, and was happy with.    TAMAR Huertas  [id number]

## 2018-11-16 NOTE — PROGRESS NOTE ADULT - SUBJECTIVE AND OBJECTIVE BOX
83y old  Male who presents with a chief complaint of respiratory distress, SOB, chest pain (16 Nov 2018 16:07)      Interval history:  Afebrile, some cough, no SOB, no N/V/D, no abdominal pain, denies dysuria. Ankle pain much better.     No Known Allergies    Antimicrobials:      REVIEW OF SYSTEMS:  No chest pain  No rash.     Vital Signs Last 24 Hrs  T(C): 36.5 (11-16-18 @ 11:37), Max: 36.8 (11-15-18 @ 23:59)  T(F): 97.7 (11-16-18 @ 11:37), Max: 98.3 (11-16-18 @ 03:47)  HR: 70 (11-16-18 @ 16:43) (61 - 77)  BP: 138/53 (11-16-18 @ 16:43) (121/56 - 164/81)  RR: 19 (11-16-18 @ 11:37) (18 - 19)  SpO2: 97% (11-16-18 @ 11:37) (97% - 99%)      PHYSICAL EXAM:  Patient in no acute distress. AAOX3. Sitting in recliner.   No icterus, no oral ulcers.  Cardiovascular: S1S2 normal, + murmur.   Lungs: + air entry B/L lung fields.  Gastrointestinal: soft, nontender, nondistended.  Extremities: no edema. Rt ankle with decreased swelling, minimal warmth, no erythema, ROM much improved.   IV sites not inflamed.                           8.7    4.19  )-----------( 129      ( 16 Nov 2018 07:42 )             26.4   11-16    143  |  100  |  64<H>  ----------------------------<  148<H>  3.4<L>   |  26  |  2.43<H>    Ca    9.5      16 Nov 2018 06:12        Culture - Urine (collected 14 Nov 2018 15:11)  Source: .Urine Clean Catch (Midstream)  Final Report (16 Nov 2018 14:14):    10,000 - 49,000 CFU/mL Enterococcus faecalis  Organism: Enterococcus faecalis (16 Nov 2018 14:14)  Organism: Enterococcus faecalis (16 Nov 2018 14:14)      Culture - Blood (collected 14 Nov 2018 02:45)  Source: .Blood Blood  Preliminary Report (15 Nov 2018 03:03):    No growth to date.    Culture - Blood (collected 14 Nov 2018 02:42)  Source: .Blood Blood  Preliminary Report (15 Nov 2018 03:03):    No growth to date.

## 2018-11-16 NOTE — PROGRESS NOTE ADULT - PROBLEM SELECTOR PLAN 8
- A1c 4.7  - pt states he has been giving himself variable lantus, but usually around 7U a night.  - monitoring off basal insulin - A1c 4.7. will start on basal insulin 5 units qhs given now on steroid.  - pt states he has been giving himself variable lantus, but usually around 7U a night.  - monitoring on SS.

## 2018-11-17 DIAGNOSIS — M10.9 GOUT, UNSPECIFIED: ICD-10-CM

## 2018-11-17 LAB
ANION GAP SERPL CALC-SCNC: 14 MMOL/L — SIGNIFICANT CHANGE UP (ref 5–17)
BUN SERPL-MCNC: 73 MG/DL — HIGH (ref 7–23)
CALCIUM SERPL-MCNC: 9.3 MG/DL — SIGNIFICANT CHANGE UP (ref 8.4–10.5)
CHLORIDE SERPL-SCNC: 104 MMOL/L — SIGNIFICANT CHANGE UP (ref 96–108)
CO2 SERPL-SCNC: 25 MMOL/L — SIGNIFICANT CHANGE UP (ref 22–31)
CREAT SERPL-MCNC: 2.33 MG/DL — HIGH (ref 0.5–1.3)
GLUCOSE BLDC GLUCOMTR-MCNC: 154 MG/DL — HIGH (ref 70–99)
GLUCOSE BLDC GLUCOMTR-MCNC: 170 MG/DL — HIGH (ref 70–99)
GLUCOSE BLDC GLUCOMTR-MCNC: 189 MG/DL — HIGH (ref 70–99)
GLUCOSE BLDC GLUCOMTR-MCNC: 193 MG/DL — HIGH (ref 70–99)
GLUCOSE BLDC GLUCOMTR-MCNC: 270 MG/DL — HIGH (ref 70–99)
GLUCOSE SERPL-MCNC: 148 MG/DL — HIGH (ref 70–99)
HCT VFR BLD CALC: 26 % — LOW (ref 39–50)
HGB BLD-MCNC: 8.1 G/DL — LOW (ref 13–17)
MCHC RBC-ENTMCNC: 28.4 PG — SIGNIFICANT CHANGE UP (ref 27–34)
MCHC RBC-ENTMCNC: 31.2 GM/DL — LOW (ref 32–36)
MCV RBC AUTO: 91.2 FL — SIGNIFICANT CHANGE UP (ref 80–100)
PLATELET # BLD AUTO: 143 K/UL — LOW (ref 150–400)
POTASSIUM SERPL-MCNC: 3.5 MMOL/L — SIGNIFICANT CHANGE UP (ref 3.5–5.3)
POTASSIUM SERPL-SCNC: 3.5 MMOL/L — SIGNIFICANT CHANGE UP (ref 3.5–5.3)
RBC # BLD: 2.85 M/UL — LOW (ref 4.2–5.8)
RBC # FLD: 14.7 % — HIGH (ref 10.3–14.5)
SODIUM SERPL-SCNC: 143 MMOL/L — SIGNIFICANT CHANGE UP (ref 135–145)
WBC # BLD: 3.92 K/UL — SIGNIFICANT CHANGE UP (ref 3.8–10.5)
WBC # FLD AUTO: 3.92 K/UL — SIGNIFICANT CHANGE UP (ref 3.8–10.5)

## 2018-11-17 PROCEDURE — 99233 SBSQ HOSP IP/OBS HIGH 50: CPT | Mod: GC

## 2018-11-17 PROCEDURE — 99233 SBSQ HOSP IP/OBS HIGH 50: CPT

## 2018-11-17 RX ADMIN — Medication 1: at 18:16

## 2018-11-17 RX ADMIN — HEPARIN SODIUM 5000 UNIT(S): 5000 INJECTION INTRAVENOUS; SUBCUTANEOUS at 13:26

## 2018-11-17 RX ADMIN — BUDESONIDE AND FORMOTEROL FUMARATE DIHYDRATE 2 PUFF(S): 160; 4.5 AEROSOL RESPIRATORY (INHALATION) at 18:37

## 2018-11-17 RX ADMIN — INSULIN GLARGINE 5 UNIT(S): 100 INJECTION, SOLUTION SUBCUTANEOUS at 21:32

## 2018-11-17 RX ADMIN — Medication 90 MILLIGRAM(S): at 06:08

## 2018-11-17 RX ADMIN — Medication 40 MILLIGRAM(S): at 18:37

## 2018-11-17 RX ADMIN — Medication 50 MILLIGRAM(S): at 06:08

## 2018-11-17 RX ADMIN — Medication 1: at 12:49

## 2018-11-17 RX ADMIN — SERTRALINE 200 MILLIGRAM(S): 25 TABLET, FILM COATED ORAL at 12:01

## 2018-11-17 RX ADMIN — Medication 50 MILLIGRAM(S): at 18:37

## 2018-11-17 RX ADMIN — Medication 1: at 08:27

## 2018-11-17 RX ADMIN — PANTOPRAZOLE SODIUM 40 MILLIGRAM(S): 20 TABLET, DELAYED RELEASE ORAL at 06:08

## 2018-11-17 RX ADMIN — Medication 100 MILLIGRAM(S): at 13:26

## 2018-11-17 RX ADMIN — CLOPIDOGREL BISULFATE 75 MILLIGRAM(S): 75 TABLET, FILM COATED ORAL at 12:01

## 2018-11-17 RX ADMIN — SENNA PLUS 2 TABLET(S): 8.6 TABLET ORAL at 21:31

## 2018-11-17 RX ADMIN — OXYCODONE HYDROCHLORIDE 5 MILLIGRAM(S): 5 TABLET ORAL at 12:01

## 2018-11-17 RX ADMIN — SIMVASTATIN 20 MILLIGRAM(S): 20 TABLET, FILM COATED ORAL at 21:31

## 2018-11-17 RX ADMIN — Medication 81 MILLIGRAM(S): at 12:01

## 2018-11-17 RX ADMIN — Medication 2 MILLIGRAM(S): at 21:31

## 2018-11-17 RX ADMIN — BUDESONIDE AND FORMOTEROL FUMARATE DIHYDRATE 2 PUFF(S): 160; 4.5 AEROSOL RESPIRATORY (INHALATION) at 06:09

## 2018-11-17 RX ADMIN — Medication 1: at 21:31

## 2018-11-17 RX ADMIN — Medication 100 MILLIGRAM(S): at 06:08

## 2018-11-17 RX ADMIN — Medication 40 MILLIGRAM(S): at 12:02

## 2018-11-17 RX ADMIN — HEPARIN SODIUM 5000 UNIT(S): 5000 INJECTION INTRAVENOUS; SUBCUTANEOUS at 06:08

## 2018-11-17 RX ADMIN — Medication 100 MILLIGRAM(S): at 18:37

## 2018-11-17 RX ADMIN — Medication 40 MILLIGRAM(S): at 06:08

## 2018-11-17 RX ADMIN — HEPARIN SODIUM 5000 UNIT(S): 5000 INJECTION INTRAVENOUS; SUBCUTANEOUS at 21:31

## 2018-11-17 RX ADMIN — Medication 100 MILLIGRAM(S): at 21:31

## 2018-11-17 RX ADMIN — OXYCODONE HYDROCHLORIDE 5 MILLIGRAM(S): 5 TABLET ORAL at 13:00

## 2018-11-17 NOTE — PROGRESS NOTE ADULT - PROBLEM SELECTOR PLAN 1
R ankle gout flare improving - not resolved  Cont on IV solumedrol 40mg today, then 30mg cuate per rhuem  Holding off colchicine as per renal rec.

## 2018-11-17 NOTE — PROGRESS NOTE ADULT - PROBLEM SELECTOR PLAN 8
- A1c 4.7. FS stable  - c/w lantus 5 units qhs with BASHIR given now on steroid.  - pt states he has been giving himself variable lantus, but usually around 7U a night.  - monitoring on SS.

## 2018-11-17 NOTE — PROGRESS NOTE ADULT - PROBLEM SELECTOR PLAN 4
- 2/2 pulm edema  - cont diuresis as above, repeat cxr in AM  - trial and monitor off oxygen, off Bipap.   - CT chest showed b/l pleural effusions, pulmonary edema on admission.   - hold off antibiotics, as pt does not endorse s/s active infection/URI/PNA symptoms

## 2018-11-17 NOTE — PROGRESS NOTE ADULT - ASSESSMENT
83M w/ PMHx of CAD (s/p PCI x 66 stents (last stent 6 years ago), CHF, CKD (unknown baseline), DM2, HTN, HLD, chronic R lower back pain and chronic suprapubic pain, former smoker, presents with 3 days of increasing SOB, leg swelling, and chest pain. Patient diuresed and now appears euvolemic however with severe MR on physical exam and on TTE. Hyperdynamic LVEF. Only had mild to mod MR on previous echo in 2015    -continue lasix PO BID, if Cr does not improve then consider going down to lasix 40mg daily  -increase afterload reducing agents as tolerated (could increase nifedipine to BID). Avoid ACEI/ARB for now  -plan for MARBIN today  -will have structural heart team see patient after MARBIN 83M w/ PMHx of CAD (s/p PCI x 6), reported "CHF" (nl LVSF on TTE, possibly valvular HF 2/2 severe MR), CKD (unknown baseline Cr), DM2, HTN, HLD, who was admitted on 11/10 w/ symptoms likely 2/2 valvular HF 2/2 severe MR. 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 The patient also has mod-severe AI, severe pHTN, severe LAE, mod TR, mod PI. Clinically the patient appears euvolemic.    Plan:    1. 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 Chang Smart (does not need to be NPO after MN on Sunday) as w/u prior to MitraClip  -Continue with current dose of Lasix (40 p.o. bid), monitor Cr closely  -c/w afterload reduction (Hydralazine 100 tid), no ACEI/ARB 2/2 CKD    2. CAD  -c/w ASA 81, Plavix 75, statin  -LHC on Monday as above  -c/w B-blocker    3. HTN  -c/w current dose of BB, CCB and Hydralazine    4. HLD  -c/w Simvastatin 20    General Cardiology will continue to follow    FAMILIA Nicole  Cardiology Fellow  (p): [phone]

## 2018-11-17 NOTE — PROGRESS NOTE ADULT - SUBJECTIVE AND OBJECTIVE BOX
Patient seen and examined at bedside on 4 Alberto, no family at bedside    Overnight Events: No events overnight    Review Of Systems: No chest pain or palpitations. The pt is still reporting SOB and states he was coughing overnight. He does not use supplemental O2 at home and is currently on 2L NC    Current Meds:  acetaminophen   Tablet .. 650 milliGRAM(s) Oral every 6 hours PRN  aspirin enteric coated 81 milliGRAM(s) Oral daily  buDESOnide 160 MICROgram(s)/formoterol 4.5 MICROgram(s) Inhaler 2 Puff(s) Inhalation two times a day  clopidogrel Tablet 75 milliGRAM(s) Oral daily  docusate sodium 100 milliGRAM(s) Oral three times a day  doxazosin 2 milliGRAM(s) Oral at bedtime  furosemide    Tablet 40 milliGRAM(s) Oral two times a day  heparin  Injectable 5000 Unit(s) SubCutaneous every 8 hours  hydrALAZINE 100 milliGRAM(s) Oral every 8 hours PRN  influenza   Vaccine 0.5 milliLiter(s) IntraMuscular once  insulin glargine Injectable (LANTUS) 5 Unit(s) SubCutaneous at bedtime  insulin lispro (HumaLOG) corrective regimen sliding scale   SubCutaneous three times a day before meals  insulin lispro (HumaLOG) corrective regimen sliding scale   SubCutaneous at bedtime  methylPREDNISolone sodium succinate Injectable 40 milliGRAM(s) IV Push once  metoprolol tartrate 50 milliGRAM(s) Oral two times a day  NIFEdipine XL 90 milliGRAM(s) Oral daily  oxyCODONE    IR 5 milliGRAM(s) Oral every 4 hours PRN  pantoprazole    Tablet 40 milliGRAM(s) Oral before breakfast  senna 2 Tablet(s) Oral at bedtime  sertraline 200 milliGRAM(s) Oral daily  simvastatin 20 milliGRAM(s) Oral at bedtime    Vitals:  T(F): 98.3 (11-17), Max: 98.3 (11-17)  HR: 70 (11-17) (61 - 70)  BP: 150/58 (11-17) (121/56 - 150/58)  RR: 18 (11-17)  SpO2: 96% on 2L NC    I&O's Summary    16 Nov 2018 07:01  -  17 Nov 2018 07:00  --------------------------------------------------------  IN: 476 mL / OUT: 1500 mL / NET: -1024 mL    No weights documented    Physical Exam:  Appearance: No acute distress; well appearing, speaking in full sentences, breathing comfortably on 2L NC  Eyes: PERRL, EOMI, pink conjunctiva  HEENT: Normal oral mucosa  Cardiovascular: RRR, S1, S2, 3/6 systolic murmur loudest at the apex, no rubs or gallops; no edema; no JVD  Respiratory: Clear to auscultation bilaterally, no rales, no wheeze  Gastrointestinal: soft, non-tender, non-distended with normal bowel sounds  Musculoskeletal: No clubbing; no joint deformity   Neurologic: Non-focal  Lymphatic: No lymphadenopathy  Psychiatry: AAOx3, mood & affect appropriate  Skin: No rashes, ecchymoses, or cyanosis                          8.1    3.92  )-----------( 143      ( 17 Nov 2018 08:08 )             26.0     11-17    143  |  104  |  73<H>  ----------------------------<  148<H>  3.5   |  25  |  2.33<--2.43<--2.44<--2.45    Ca    9.3      17 Nov 2018 06:12    Echo: < from: Transesophageal Echocardiogram (11.16.18 @ 09:08) > Observations: Mitral Valve: Mitral annular calcification, mildly thickened leaflets . There is a torn chordae seen associated with the lateral aspect of the posterior leaflet  Severe eccentric mitral regurgitation. Late systolic reversal noted in the pulmonary veins. Aortic Valve/Aorta: Calcified trileaflet aortic valve with decreased opening. Estimated aortic valve area equals 1.8 sqcm (by continuity equation), consistent with mild aortic stenosis. Moderate-severe aortic regurgitation. Mild atheroma noted in aortic arch/descending aorta. Left Atrium: Severe left atrial enlargement.  No left atrial or left atrial appendage thrombus. Left Ventricle: Normal left ventricular systolic function. Normal left ventricular internal dimensions and wall thickness. Right Heart: Normal right atrium. Normal right ventricular size and function. Normal tricuspid valve. Moderate tricuspid regurgitation. Normal pulmonic valve. Moderate pulmonic regurgitation. Pericardium/Pleura: Normal pericardium with no pericardial effusion. Hemodynamic: Estimated right atrial pressure is 8 mm Hg. Estimated right ventricular systolic pressure equals 69 mm Hg, assuming right atrial pressure equals 8 mm Hg, consistent with severe pulmonary hypertension. Contrast injection demonstrates no evidence of a patent foramen ovale.     Interpretation of Telemetry: sinus 50s - 70s Patient seen and examined at bedside on 4 Alberto, no family at bedside    Overnight Events: No events overnight    Review Of Systems: No chest pain or palpitations. The pt is still reporting SOB and states he was coughing overnight. He does not use supplemental O2 at home and is currently on 2L NC    Current Meds:  acetaminophen   Tablet .. 650 milliGRAM(s) Oral every 6 hours PRN  aspirin enteric coated 81 milliGRAM(s) Oral daily  buDESOnide 160 MICROgram(s)/formoterol 4.5 MICROgram(s) Inhaler 2 Puff(s) Inhalation two times a day  clopidogrel Tablet 75 milliGRAM(s) Oral daily  docusate sodium 100 milliGRAM(s) Oral three times a day  doxazosin 2 milliGRAM(s) Oral at bedtime  furosemide    Tablet 40 milliGRAM(s) Oral two times a day  heparin  Injectable 5000 Unit(s) SubCutaneous every 8 hours  hydrALAZINE 100 milliGRAM(s) Oral every 8 hours PRN  influenza   Vaccine 0.5 milliLiter(s) IntraMuscular once  insulin glargine Injectable (LANTUS) 5 Unit(s) SubCutaneous at bedtime  insulin lispro (HumaLOG) corrective regimen sliding scale   SubCutaneous three times a day before meals  insulin lispro (HumaLOG) corrective regimen sliding scale   SubCutaneous at bedtime  methylPREDNISolone sodium succinate Injectable 40 milliGRAM(s) IV Push once  metoprolol tartrate 50 milliGRAM(s) Oral two times a day  NIFEdipine XL 90 milliGRAM(s) Oral daily  oxyCODONE    IR 5 milliGRAM(s) Oral every 4 hours PRN  pantoprazole    Tablet 40 milliGRAM(s) Oral before breakfast  senna 2 Tablet(s) Oral at bedtime  sertraline 200 milliGRAM(s) Oral daily  simvastatin 20 milliGRAM(s) Oral at bedtime    Vitals:  T(F): 98.3 (11-17), Max: 98.3 (11-17)  HR: 70 (11-17) (61 - 70)  BP: 150/58 (11-17) (121/56 - 150/58)  RR: 18 (11-17)  SpO2: 96% on 2L NC    I&O's Summary    16 Nov 2018 07:01  -  17 Nov 2018 07:00  --------------------------------------------------------  IN: 476 mL / OUT: 1500 mL / NET: -1024 mL    No weights documented    Physical Exam:  Appearance: No acute distress; well appearing, speaking in full sentences, breathing comfortably on 2L NC  Eyes: PERRL, EOMI, pink conjunctiva  HEENT: Normal oral mucosa  Cardiovascular: RRR, S1, S2, 3/6 systolic murmur loudest at the apex, no rubs or gallops; no edema; no JVD  Respiratory: Clear to auscultation bilaterally, no rales, no wheeze  Gastrointestinal: soft, non-tender, non-distended with normal bowel sounds  Musculoskeletal: No clubbing; no joint deformity   Neurologic: Non-focal  Lymphatic: No lymphadenopathy  Psychiatry: AAOx3, mood & affect appropriate  Skin: No rashes, ecchymoses, or cyanosis                          8.1    3.92  )-----------( 143      ( 17 Nov 2018 08:08 )             26.0     11-17    143  |  104  |  73<H>  ----------------------------<  148<H>  3.5   |  25  |  2.33<--2.43<--2.44<--2.45    Ca    9.3      17 Nov 2018 06:12    Echo: < from: Transesophageal Echocardiogram (11.16.18 @ 09:08) > Observations: Mitral Valve: Mitral annular calcification, mildly thickened leaflets . There is a torn chordae seen associated with the lateral aspect of the posterior leaflet  Severe eccentric mitral regurgitation. Late systolic reversal noted in the pulmonary veins. Aortic Valve/Aorta: Calcified trileaflet aortic valve with decreased opening. Estimated aortic valve area equals 1.8 sqcm (by continuity equation), consistent with mild aortic stenosis. Moderate-severe aortic regurgitation. Mild atheroma noted in aortic arch/descending aorta. Left Atrium: Severe left atrial enlargement.  No left atrial or left atrial appendage thrombus. Left Ventricle: Normal left ventricular systolic function. Normal left ventricular internal dimensions and wall thickness. Right Heart: Normal right atrium. Normal right ventricular size and function. Normal tricuspid valve. Moderate tricuspid regurgitation. Normal pulmonic valve. Moderate pulmonic regurgitation. Pericardium/Pleura: Normal pericardium with no pericardial effusion. Hemodynamic: Estimated right atrial pressure is 8 mm Hg. Estimated right ventricular systolic pressure equals 69 mm Hg, assuming right atrial pressure equals 8 mm Hg, consistent with severe pulmonary hypertension. Contrast injection demonstrates no evidence of a patent foramen ovale.    Interpretation of Telemetry: sinus 50s - 70s Patient seen and examined at bedside on 4 Alberto, no family at bedside    Overnight Events: No events overnight    Review Of Systems: No chest pain or palpitations. The pt is still reporting SOB and states he was coughing overnight. He does not use supplemental O2 at home and is currently on 2L NC    REVIEW OF SYSTEMS:  Constitutional: No Fever, Fatigue, Weight Changes  Eyes: No Recent Vision Changes, Eye Pain  ENT: No Congestion, Ear Pain, Sore Throat  Endocrine: No Excess Sweating, Temperature Intolerance  Cardiovascular: No Chest Pain, Palpitations, Shortness of Breath, Pre-syncope, Syncope, LE Edema  Respiratory: No Cough, Congestion, Wheezing  Gastrointestinal: No Abdominal Pain, Nausea, Vomiting  Genitourinary: No dysuria, hematuria  Musculoskeletal: No Joint Pain, Swelling  Neurologic: No headaches, Imbalance, Focal Deficits  Skin: No rashes, hematoma, purprura      Current Meds:  acetaminophen   Tablet .. 650 milliGRAM(s) Oral every 6 hours PRN  aspirin enteric coated 81 milliGRAM(s) Oral daily  buDESOnide 160 MICROgram(s)/formoterol 4.5 MICROgram(s) Inhaler 2 Puff(s) Inhalation two times a day  clopidogrel Tablet 75 milliGRAM(s) Oral daily  docusate sodium 100 milliGRAM(s) Oral three times a day  doxazosin 2 milliGRAM(s) Oral at bedtime  furosemide    Tablet 40 milliGRAM(s) Oral two times a day  heparin  Injectable 5000 Unit(s) SubCutaneous every 8 hours  hydrALAZINE 100 milliGRAM(s) Oral every 8 hours PRN  influenza   Vaccine 0.5 milliLiter(s) IntraMuscular once  insulin glargine Injectable (LANTUS) 5 Unit(s) SubCutaneous at bedtime  insulin lispro (HumaLOG) corrective regimen sliding scale   SubCutaneous three times a day before meals  insulin lispro (HumaLOG) corrective regimen sliding scale   SubCutaneous at bedtime  methylPREDNISolone sodium succinate Injectable 40 milliGRAM(s) IV Push once  metoprolol tartrate 50 milliGRAM(s) Oral two times a day  NIFEdipine XL 90 milliGRAM(s) Oral daily  oxyCODONE    IR 5 milliGRAM(s) Oral every 4 hours PRN  pantoprazole    Tablet 40 milliGRAM(s) Oral before breakfast  senna 2 Tablet(s) Oral at bedtime  sertraline 200 milliGRAM(s) Oral daily  simvastatin 20 milliGRAM(s) Oral at bedtime    Vitals:  T(F): 98.3 (11-17), Max: 98.3 (11-17)  HR: 70 (11-17) (61 - 70)  BP: 150/58 (11-17) (121/56 - 150/58)  RR: 18 (11-17)  SpO2: 96% on 2L NC    I&O's Summary    16 Nov 2018 07:01  -  17 Nov 2018 07:00  --------------------------------------------------------  IN: 476 mL / OUT: 1500 mL / NET: -1024 mL    No weights documented    Physical Exam:  Appearance: No acute distress; well appearing, speaking in full sentences, breathing comfortably on 2L NC  Eyes: PERRL, EOMI, pink conjunctiva  HEENT: Normal oral mucosa  Cardiovascular: RRR, S1, S2, 3/6 systolic murmur loudest at the apex, no rubs or gallops; no edema; no JVD  Respiratory: Clear to auscultation bilaterally, no rales, no wheeze  Gastrointestinal: soft, non-tender, non-distended with normal bowel sounds  Musculoskeletal: No clubbing; no joint deformity   Neurologic: Non-focal  Lymphatic: No lymphadenopathy  Psychiatry: AAOx3, mood & affect appropriate  Skin: No rashes, ecchymoses, or cyanosis                          8.1    3.92  )-----------( 143      ( 17 Nov 2018 08:08 )             26.0     11-17    143  |  104  |  73<H>  ----------------------------<  148<H>  3.5   |  25  |  2.33<--2.43<--2.44<--2.45    Ca    9.3      17 Nov 2018 06:12    Echo: < from: Transesophageal Echocardiogram (11.16.18 @ 09:08) > Observations: Mitral Valve: Mitral annular calcification, mildly thickened leaflets . There is a torn chordae seen associated with the lateral aspect of the posterior leaflet  Severe eccentric mitral regurgitation. Late systolic reversal noted in the pulmonary veins. Aortic Valve/Aorta: Calcified trileaflet aortic valve with decreased opening. Estimated aortic valve area equals 1.8 sqcm (by continuity equation), consistent with mild aortic stenosis. Moderate-severe aortic regurgitation. Mild atheroma noted in aortic arch/descending aorta. Left Atrium: Severe left atrial enlargement.  No left atrial or left atrial appendage thrombus. Left Ventricle: Normal left ventricular systolic function. Normal left ventricular internal dimensions and wall thickness. Right Heart: Normal right atrium. Normal right ventricular size and function. Normal tricuspid valve. Moderate tricuspid regurgitation. Normal pulmonic valve. Moderate pulmonic regurgitation. Pericardium/Pleura: Normal pericardium with no pericardial effusion. Hemodynamic: Estimated right atrial pressure is 8 mm Hg. Estimated right ventricular systolic pressure equals 69 mm Hg, assuming right atrial pressure equals 8 mm Hg, consistent with severe pulmonary hypertension. Contrast injection demonstrates no evidence of a patent foramen ovale.    Interpretation of Telemetry: sinus 50s - 70s

## 2018-11-17 NOTE — PROGRESS NOTE ADULT - SUBJECTIVE AND OBJECTIVE BOX
Patient is a 83y old  Male who presents with a chief complaint of Respiratory distress, SOB, chest pain (17 Nov 2018 09:05)      SUBJECTIVE / OVERNIGHT EVENTS: No overnight events. Still with some cough and sob. Denies cp, sob. R ankle pain improving but not resolved.     MEDICATIONS  (STANDING):  aspirin enteric coated 81 milliGRAM(s) Oral daily  buDESOnide 160 MICROgram(s)/formoterol 4.5 MICROgram(s) Inhaler 2 Puff(s) Inhalation two times a day  clopidogrel Tablet 75 milliGRAM(s) Oral daily  dextrose 5%. 1000 milliLiter(s) (50 mL/Hr) IV Continuous <Continuous>  dextrose 50% Injectable 12.5 Gram(s) IV Push once  docusate sodium 100 milliGRAM(s) Oral three times a day  doxazosin 2 milliGRAM(s) Oral at bedtime  furosemide    Tablet 40 milliGRAM(s) Oral two times a day  heparin  Injectable 5000 Unit(s) SubCutaneous every 8 hours  influenza   Vaccine 0.5 milliLiter(s) IntraMuscular once  insulin glargine Injectable (LANTUS) 5 Unit(s) SubCutaneous at bedtime  insulin lispro (HumaLOG) corrective regimen sliding scale   SubCutaneous three times a day before meals  insulin lispro (HumaLOG) corrective regimen sliding scale   SubCutaneous at bedtime  metoprolol tartrate 50 milliGRAM(s) Oral two times a day  NIFEdipine XL 90 milliGRAM(s) Oral daily  pantoprazole    Tablet 40 milliGRAM(s) Oral before breakfast  senna 2 Tablet(s) Oral at bedtime  sertraline 200 milliGRAM(s) Oral daily  simvastatin 20 milliGRAM(s) Oral at bedtime    MEDICATIONS  (PRN):  acetaminophen   Tablet .. 650 milliGRAM(s) Oral every 6 hours PRN Temp greater or equal to 38C (100.4F)  dextrose 40% Gel 15 Gram(s) Oral once PRN Blood Glucose LESS THAN 70 milliGRAM(s)/deciliter  hydrALAZINE 100 milliGRAM(s) Oral every 8 hours PRN Systolic blood pressure > 170  oxyCODONE    IR 5 milliGRAM(s) Oral every 4 hours PRN Severe Pain (7 - 10)      Vital Signs Last 24 Hrs  T(C): 36.7 (17 Nov 2018 11:57), Max: 36.8 (17 Nov 2018 04:24)  T(F): 98 (17 Nov 2018 11:57), Max: 98.3 (17 Nov 2018 04:24)  HR: 54 (17 Nov 2018 11:57) (54 - 70)  BP: 143/55 (17 Nov 2018 11:57) (138/53 - 150/58)  BP(mean): --  RR: 18 (17 Nov 2018 11:57) (18 - 18)  SpO2: 98% (17 Nov 2018 11:57) (96% - 99%)  CAPILLARY BLOOD GLUCOSE      POCT Blood Glucose.: 170 mg/dL (17 Nov 2018 11:54)  POCT Blood Glucose.: 154 mg/dL (17 Nov 2018 07:52)  POCT Blood Glucose.: 163 mg/dL (16 Nov 2018 21:25)  POCT Blood Glucose.: 263 mg/dL (16 Nov 2018 16:33)    I&O's Summary    16 Nov 2018 07:01  -  17 Nov 2018 07:00  --------------------------------------------------------  IN: 476 mL / OUT: 1500 mL / NET: -1024 mL    17 Nov 2018 07:01  -  17 Nov 2018 15:19  --------------------------------------------------------  IN: 436 mL / OUT: 475 mL / NET: -39 mL        PHYSICAL EXAM:  GENERAL: NAD, well-developed on 2L NC  HEAD:  Atraumatic, Normocephalic  NECK: Supple, No JVD  CHEST/LUNG: Clear to auscultation bilaterally; No wheeze  HEART: Regular rate and rhythm; +systolic murmur  ABDOMEN: Soft, Nontender, Nondistended; Bowel sounds present  EXTREMITIES:  2+ Peripheral Pulses, No clubbing, cyanosis, or edema. R ankle with mild tenderness  PSYCH: AAOx3  NEUROLOGY: non-focal  SKIN: No rashes or lesions    LABS:                        8.1    3.92  )-----------( 143      ( 17 Nov 2018 08:08 )             26.0     11-17    143  |  104  |  73<H>  ----------------------------<  148<H>  3.5   |  25  |  2.33<H>    Ca    9.3      17 Nov 2018 06:12                    RADIOLOGY & ADDITIONAL TESTS:    tele reviewed: sinus 60-70s    Imaging Personally Reviewed: xray IMPRESSION:     Vertebral body heights are maintained. Lumbar dextroscoliosis. Alignment   is otherwise normal. Multilevel spondylosis characterized by varying   levels of disc height loss and marginal osteophyte formation. Mild   bilateral sacroiliac joint arthrosis.    MARBIN: Conclusions:  1. Mitral annular calcification, mildly thickened leaflets  . There is a torn chordae seen associated with the lateral  aspect of the posterior leaflet  Severe eccentric mitral  regurgitation.Late systolic reversal noted in the pulmonary  veins.  2. Calcified trileaflet aortic valve with decreased  opening. Estimated aortic valve area equals 1.8 sqcm (by  continuity equation), consistent with mild aortic stenosis.  Moderate-severe aortic regurgitation.  3. Severe left atrial enlargement.  No left atrial or left  atrial appendage thrombus.  4. Normal left ventricular systolic function.  5. Normal right ventricular size and function.  6. Normal tricuspid valve. Moderate tricuspid  regurgitation.  7. Normal pulmonic valve. Moderate pulmonic regurgitation.  Estimated pulmonary artery systolic pressure equals 69  mm  Hg, assuming right atrial pressure equals 8 mm Hg,  consistent with severe pulmonary pressures.  ------------------------------------------------------------------------    Consultant(s) Notes Reviewed:  rheum, cards, structural heart    Care Discussed with Consultants/Other Providers:

## 2018-11-17 NOTE — PROGRESS NOTE ADULT - ASSESSMENT
83M, trilingual Scottish/Turkmen speaking, c hx CAD s/p 6 stents (last stent 6 years ago), CHF, CKD (unknown baseline), DM2, HTN, HLD, chronic R lower back pain and chronic suprapubic pain, former smoker, pw hypoxic resp failure 2/2 pulm edema likely 2/2 CHF exacerbation with severe MR, severe AR, mild AS.

## 2018-11-17 NOTE — PROGRESS NOTE ADULT - PROBLEM SELECTOR PLAN 2
Suspect due to acute gout flare. no additional fevers since  IV steroids as above  Seen by ID no evidence of infection. monitor off abx. ID appreciated.  Xray showing small amount of joint fluid but no bone abnormalities.

## 2018-11-18 LAB
ANION GAP SERPL CALC-SCNC: 15 MMOL/L — SIGNIFICANT CHANGE UP (ref 5–17)
BUN SERPL-MCNC: 67 MG/DL — HIGH (ref 7–23)
CALCIUM SERPL-MCNC: 9.2 MG/DL — SIGNIFICANT CHANGE UP (ref 8.4–10.5)
CHLORIDE SERPL-SCNC: 106 MMOL/L — SIGNIFICANT CHANGE UP (ref 96–108)
CO2 SERPL-SCNC: 25 MMOL/L — SIGNIFICANT CHANGE UP (ref 22–31)
CREAT SERPL-MCNC: 2.37 MG/DL — HIGH (ref 0.5–1.3)
GLUCOSE BLDC GLUCOMTR-MCNC: 175 MG/DL — HIGH (ref 70–99)
GLUCOSE BLDC GLUCOMTR-MCNC: 181 MG/DL — HIGH (ref 70–99)
GLUCOSE BLDC GLUCOMTR-MCNC: 209 MG/DL — HIGH (ref 70–99)
GLUCOSE BLDC GLUCOMTR-MCNC: 214 MG/DL — HIGH (ref 70–99)
GLUCOSE SERPL-MCNC: 137 MG/DL — HIGH (ref 70–99)
HCT VFR BLD CALC: 27.4 % — LOW (ref 39–50)
HGB BLD-MCNC: 8.6 G/DL — LOW (ref 13–17)
MCHC RBC-ENTMCNC: 29.1 PG — SIGNIFICANT CHANGE UP (ref 27–34)
MCHC RBC-ENTMCNC: 31.4 GM/DL — LOW (ref 32–36)
MCV RBC AUTO: 92.6 FL — SIGNIFICANT CHANGE UP (ref 80–100)
PLATELET # BLD AUTO: 145 K/UL — LOW (ref 150–400)
POTASSIUM SERPL-MCNC: 3.6 MMOL/L — SIGNIFICANT CHANGE UP (ref 3.5–5.3)
POTASSIUM SERPL-SCNC: 3.6 MMOL/L — SIGNIFICANT CHANGE UP (ref 3.5–5.3)
RBC # BLD: 2.96 M/UL — LOW (ref 4.2–5.8)
RBC # FLD: 14.8 % — HIGH (ref 10.3–14.5)
SODIUM SERPL-SCNC: 146 MMOL/L — HIGH (ref 135–145)
WBC # BLD: 3.67 K/UL — LOW (ref 3.8–10.5)
WBC # FLD AUTO: 3.67 K/UL — LOW (ref 3.8–10.5)

## 2018-11-18 PROCEDURE — 71045 X-RAY EXAM CHEST 1 VIEW: CPT | Mod: 26

## 2018-11-18 PROCEDURE — 99233 SBSQ HOSP IP/OBS HIGH 50: CPT

## 2018-11-18 PROCEDURE — 99233 SBSQ HOSP IP/OBS HIGH 50: CPT | Mod: GC

## 2018-11-18 RX ORDER — FUROSEMIDE 40 MG
80 TABLET ORAL ONCE
Qty: 0 | Refills: 0 | Status: COMPLETED | OUTPATIENT
Start: 2018-11-18 | End: 2018-11-18

## 2018-11-18 RX ORDER — NIFEDIPINE 30 MG
60 TABLET, EXTENDED RELEASE 24 HR ORAL
Qty: 0 | Refills: 0 | Status: DISCONTINUED | OUTPATIENT
Start: 2018-11-18 | End: 2018-11-20

## 2018-11-18 RX ORDER — ACETYLCYSTEINE 200 MG/ML
600 VIAL (ML) MISCELLANEOUS EVERY 12 HOURS
Qty: 0 | Refills: 0 | Status: COMPLETED | OUTPATIENT
Start: 2018-11-18 | End: 2018-11-20

## 2018-11-18 RX ADMIN — Medication 100 MILLIGRAM(S): at 21:39

## 2018-11-18 RX ADMIN — SIMVASTATIN 20 MILLIGRAM(S): 20 TABLET, FILM COATED ORAL at 21:39

## 2018-11-18 RX ADMIN — Medication 50 MILLIGRAM(S): at 05:02

## 2018-11-18 RX ADMIN — Medication 60 MILLIGRAM(S): at 17:51

## 2018-11-18 RX ADMIN — INSULIN GLARGINE 5 UNIT(S): 100 INJECTION, SOLUTION SUBCUTANEOUS at 21:38

## 2018-11-18 RX ADMIN — Medication 80 MILLIGRAM(S): at 10:49

## 2018-11-18 RX ADMIN — BUDESONIDE AND FORMOTEROL FUMARATE DIHYDRATE 2 PUFF(S): 160; 4.5 AEROSOL RESPIRATORY (INHALATION) at 17:51

## 2018-11-18 RX ADMIN — Medication 90 MILLIGRAM(S): at 05:02

## 2018-11-18 RX ADMIN — Medication 40 MILLIGRAM(S): at 17:51

## 2018-11-18 RX ADMIN — Medication 2: at 12:36

## 2018-11-18 RX ADMIN — Medication 100 MILLIGRAM(S): at 13:52

## 2018-11-18 RX ADMIN — Medication 100 MILLIGRAM(S): at 21:57

## 2018-11-18 RX ADMIN — Medication 81 MILLIGRAM(S): at 11:56

## 2018-11-18 RX ADMIN — Medication 50 MILLIGRAM(S): at 17:51

## 2018-11-18 RX ADMIN — PANTOPRAZOLE SODIUM 40 MILLIGRAM(S): 20 TABLET, DELAYED RELEASE ORAL at 06:39

## 2018-11-18 RX ADMIN — Medication 30 MILLIGRAM(S): at 05:02

## 2018-11-18 RX ADMIN — SERTRALINE 200 MILLIGRAM(S): 25 TABLET, FILM COATED ORAL at 11:56

## 2018-11-18 RX ADMIN — Medication 2: at 17:13

## 2018-11-18 RX ADMIN — CLOPIDOGREL BISULFATE 75 MILLIGRAM(S): 75 TABLET, FILM COATED ORAL at 11:56

## 2018-11-18 RX ADMIN — Medication 100 MILLIGRAM(S): at 05:01

## 2018-11-18 RX ADMIN — Medication 1: at 08:28

## 2018-11-18 RX ADMIN — Medication 600 MILLIGRAM(S): at 21:39

## 2018-11-18 RX ADMIN — HEPARIN SODIUM 5000 UNIT(S): 5000 INJECTION INTRAVENOUS; SUBCUTANEOUS at 21:39

## 2018-11-18 RX ADMIN — Medication 2 MILLIGRAM(S): at 21:39

## 2018-11-18 RX ADMIN — SENNA PLUS 2 TABLET(S): 8.6 TABLET ORAL at 21:39

## 2018-11-18 RX ADMIN — Medication 40 MILLIGRAM(S): at 05:02

## 2018-11-18 RX ADMIN — BUDESONIDE AND FORMOTEROL FUMARATE DIHYDRATE 2 PUFF(S): 160; 4.5 AEROSOL RESPIRATORY (INHALATION) at 05:03

## 2018-11-18 RX ADMIN — HEPARIN SODIUM 5000 UNIT(S): 5000 INJECTION INTRAVENOUS; SUBCUTANEOUS at 13:51

## 2018-11-18 RX ADMIN — HEPARIN SODIUM 5000 UNIT(S): 5000 INJECTION INTRAVENOUS; SUBCUTANEOUS at 05:01

## 2018-11-18 NOTE — PROGRESS NOTE ADULT - ASSESSMENT
CM, CHF, valvular disease  CKD  On mandatory diuresis  Sono no hydro, UA negative  Plans for cath tomorrow noted, at moderate risk of RAMÍREZ on CKD  Will start Mucomyst  Dye moderation as possible, avoid LVgram if possible

## 2018-11-18 NOTE — PROGRESS NOTE ADULT - PROBLEM SELECTOR PLAN 4
- 2/2 pulm edema  - cont diuresis as above, repeat cxr in AM  - trial and monitor off oxygen, off Bipap.   - CT chest showed b/l pleural effusions, pulmonary edema on admission.   - hold off antibiotics, as pt does not endorse s/s active infection/URI/PNA symptoms - 2/2 pulm edema which is improving  - cont diuresis as above  - trial and monitor off oxygen, off Bipap.   - CT chest showed b/l pleural effusions, pulmonary edema on admission.   - hold off antibiotics, as pt does not endorse s/s active infection/URI/PNA symptoms

## 2018-11-18 NOTE — PROGRESS NOTE ADULT - SUBJECTIVE AND OBJECTIVE BOX
Patient is a 83y old  Male who presents with a chief complaint of respiratory distress, SOB, chest pain (18 Nov 2018 14:09)      SUBJECTIVE / OVERNIGHT EVENTS: no overnight events. Sob only when laying flat otherwise denies cp, sob, palpiations. leg swelling. his R ankle pain is improving.     MEDICATIONS  (STANDING):  aspirin enteric coated 81 milliGRAM(s) Oral daily  buDESOnide 160 MICROgram(s)/formoterol 4.5 MICROgram(s) Inhaler 2 Puff(s) Inhalation two times a day  clopidogrel Tablet 75 milliGRAM(s) Oral daily  dextrose 5%. 1000 milliLiter(s) (50 mL/Hr) IV Continuous <Continuous>  dextrose 50% Injectable 12.5 Gram(s) IV Push once  docusate sodium 100 milliGRAM(s) Oral three times a day  doxazosin 2 milliGRAM(s) Oral at bedtime  furosemide    Tablet 40 milliGRAM(s) Oral two times a day  heparin  Injectable 5000 Unit(s) SubCutaneous every 8 hours  influenza   Vaccine 0.5 milliLiter(s) IntraMuscular once  insulin glargine Injectable (LANTUS) 5 Unit(s) SubCutaneous at bedtime  insulin lispro (HumaLOG) corrective regimen sliding scale   SubCutaneous three times a day before meals  insulin lispro (HumaLOG) corrective regimen sliding scale   SubCutaneous at bedtime  methylPREDNISolone sodium succinate Injectable 30 milliGRAM(s) IV Push daily  metoprolol tartrate 50 milliGRAM(s) Oral two times a day  NIFEdipine XL 90 milliGRAM(s) Oral daily  pantoprazole    Tablet 40 milliGRAM(s) Oral before breakfast  senna 2 Tablet(s) Oral at bedtime  sertraline 200 milliGRAM(s) Oral daily  simvastatin 20 milliGRAM(s) Oral at bedtime    MEDICATIONS  (PRN):  acetaminophen   Tablet .. 650 milliGRAM(s) Oral every 6 hours PRN Temp greater or equal to 38C (100.4F)  dextrose 40% Gel 15 Gram(s) Oral once PRN Blood Glucose LESS THAN 70 milliGRAM(s)/deciliter  hydrALAZINE 100 milliGRAM(s) Oral every 8 hours PRN Systolic blood pressure > 170  oxyCODONE    IR 5 milliGRAM(s) Oral every 4 hours PRN Severe Pain (7 - 10)      Vital Signs Last 24 Hrs  T(C): 36.7 (18 Nov 2018 11:57), Max: 36.7 (17 Nov 2018 20:52)  T(F): 98 (18 Nov 2018 11:57), Max: 98.1 (17 Nov 2018 20:52)  HR: 78 (18 Nov 2018 11:57) (59 - 78)  BP: 153/73 (18 Nov 2018 11:57) (153/73 - 168/61)  BP(mean): --  RR: 18 (18 Nov 2018 11:57) (18 - 18)  SpO2: 98% (18 Nov 2018 11:57) (97% - 99%)  CAPILLARY BLOOD GLUCOSE      POCT Blood Glucose.: 209 mg/dL (18 Nov 2018 12:26)  POCT Blood Glucose.: 181 mg/dL (18 Nov 2018 07:53)  POCT Blood Glucose.: 270 mg/dL (17 Nov 2018 21:21)  POCT Blood Glucose.: 189 mg/dL (17 Nov 2018 18:14)  POCT Blood Glucose.: 193 mg/dL (17 Nov 2018 16:36)    I&O's Summary    17 Nov 2018 07:01  -  18 Nov 2018 07:00  --------------------------------------------------------  IN: 676 mL / OUT: 1275 mL / NET: -599 mL    18 Nov 2018 07:01  -  18 Nov 2018 14:31  --------------------------------------------------------  IN: 0 mL / OUT: 300 mL / NET: -300 mL          PHYSICAL EXAM:  GENERAL: NAD, well-developed on 2L NC  HEAD:  Atraumatic, Normocephalic  NECK: Supple, No JVD  CHEST/LUNG: Clear to auscultation bilaterally; No wheeze  HEART: Regular rate and rhythm; +systolic murmur  ABDOMEN: Soft, Nontender, Nondistended; Bowel sounds present  EXTREMITIES:  2+ Peripheral Pulses, No clubbing, cyanosis, or edema. R ankle with mild tenderness  PSYCH: AAOx3  NEUROLOGY: non-focal  SKIN: No rashes or lesions    LABS:                        8.6    3.67  )-----------( 145      ( 18 Nov 2018 08:19 )             27.4     11-18    146<H>  |  106  |  67<H>  ----------------------------<  137<H>  3.6   |  25  |  2.37<H>    Ca    9.2      18 Nov 2018 07:09        RADIOLOGY & ADDITIONAL TESTS:    tele reviewed: sinus 60-80s    Imaging Personally Reviewed: CXR: FINDINGS/  IMPRESSION:    Small bilateral pleural effusions and interstitial edema are decreased.   No pneumothorax.    The cardiomediastinal silhouette is normal.    Consultant(s) Notes Reviewed:  cards    Care Discussed with Consultants/Other Providers: Patient is a 83y old  Male who presents with a chief complaint of respiratory distress, SOB, chest pain (18 Nov 2018 14:09)      SUBJECTIVE / OVERNIGHT EVENTS: no overnight events. Sob only when laying flat otherwise denies cp, palpitations leg swelling. his R ankle pain is improving.     MEDICATIONS  (STANDING):  aspirin enteric coated 81 milliGRAM(s) Oral daily  buDESOnide 160 MICROgram(s)/formoterol 4.5 MICROgram(s) Inhaler 2 Puff(s) Inhalation two times a day  clopidogrel Tablet 75 milliGRAM(s) Oral daily  dextrose 5%. 1000 milliLiter(s) (50 mL/Hr) IV Continuous <Continuous>  dextrose 50% Injectable 12.5 Gram(s) IV Push once  docusate sodium 100 milliGRAM(s) Oral three times a day  doxazosin 2 milliGRAM(s) Oral at bedtime  furosemide    Tablet 40 milliGRAM(s) Oral two times a day  heparin  Injectable 5000 Unit(s) SubCutaneous every 8 hours  influenza   Vaccine 0.5 milliLiter(s) IntraMuscular once  insulin glargine Injectable (LANTUS) 5 Unit(s) SubCutaneous at bedtime  insulin lispro (HumaLOG) corrective regimen sliding scale   SubCutaneous three times a day before meals  insulin lispro (HumaLOG) corrective regimen sliding scale   SubCutaneous at bedtime  methylPREDNISolone sodium succinate Injectable 30 milliGRAM(s) IV Push daily  metoprolol tartrate 50 milliGRAM(s) Oral two times a day  NIFEdipine XL 90 milliGRAM(s) Oral daily  pantoprazole    Tablet 40 milliGRAM(s) Oral before breakfast  senna 2 Tablet(s) Oral at bedtime  sertraline 200 milliGRAM(s) Oral daily  simvastatin 20 milliGRAM(s) Oral at bedtime    MEDICATIONS  (PRN):  acetaminophen   Tablet .. 650 milliGRAM(s) Oral every 6 hours PRN Temp greater or equal to 38C (100.4F)  dextrose 40% Gel 15 Gram(s) Oral once PRN Blood Glucose LESS THAN 70 milliGRAM(s)/deciliter  hydrALAZINE 100 milliGRAM(s) Oral every 8 hours PRN Systolic blood pressure > 170  oxyCODONE    IR 5 milliGRAM(s) Oral every 4 hours PRN Severe Pain (7 - 10)      Vital Signs Last 24 Hrs  T(C): 36.7 (18 Nov 2018 11:57), Max: 36.7 (17 Nov 2018 20:52)  T(F): 98 (18 Nov 2018 11:57), Max: 98.1 (17 Nov 2018 20:52)  HR: 78 (18 Nov 2018 11:57) (59 - 78)  BP: 153/73 (18 Nov 2018 11:57) (153/73 - 168/61)  BP(mean): --  RR: 18 (18 Nov 2018 11:57) (18 - 18)  SpO2: 98% (18 Nov 2018 11:57) (97% - 99%)  CAPILLARY BLOOD GLUCOSE      POCT Blood Glucose.: 209 mg/dL (18 Nov 2018 12:26)  POCT Blood Glucose.: 181 mg/dL (18 Nov 2018 07:53)  POCT Blood Glucose.: 270 mg/dL (17 Nov 2018 21:21)  POCT Blood Glucose.: 189 mg/dL (17 Nov 2018 18:14)  POCT Blood Glucose.: 193 mg/dL (17 Nov 2018 16:36)    I&O's Summary    17 Nov 2018 07:01  -  18 Nov 2018 07:00  --------------------------------------------------------  IN: 676 mL / OUT: 1275 mL / NET: -599 mL    18 Nov 2018 07:01  -  18 Nov 2018 14:31  --------------------------------------------------------  IN: 0 mL / OUT: 300 mL / NET: -300 mL          PHYSICAL EXAM:  GENERAL: NAD, well-developed on 2L NC  HEAD:  Atraumatic, Normocephalic  NECK: Supple, No JVD  CHEST/LUNG: bibasilar crackles, no wheezing  HEART: Regular rate and rhythm; +systolic murmur  ABDOMEN: Soft, Nontender, Nondistended; Bowel sounds present  EXTREMITIES:  2+ Peripheral Pulses, No clubbing, cyanosis, or edema. R ankle with mild tenderness  PSYCH: AAOx3  NEUROLOGY: non-focal  SKIN: No rashes or lesions    LABS:                        8.6    3.67  )-----------( 145      ( 18 Nov 2018 08:19 )             27.4     11-18    146<H>  |  106  |  67<H>  ----------------------------<  137<H>  3.6   |  25  |  2.37<H>    Ca    9.2      18 Nov 2018 07:09        RADIOLOGY & ADDITIONAL TESTS:    tele reviewed: sinus 60-80s    Imaging Personally Reviewed: CXR: FINDINGS/  IMPRESSION:    Small bilateral pleural effusions and interstitial edema are decreased.   No pneumothorax.    The cardiomediastinal silhouette is normal.    Consultant(s) Notes Reviewed:  cards    Care Discussed with Consultants/Other Providers:

## 2018-11-18 NOTE — PROGRESS NOTE ADULT - ASSESSMENT
83M, trilingual [place]ian/[place] speaking, c hx CAD s/p 6 stents (last stent 6 years ago), CHF, CKD (unknown baseline), DM2, HTN, HLD, chronic R lower back pain and chronic suprapubic pain, former smoker, pw hypoxic resp failure 2/2 pulm edema likely 2/2 CHF exacerbation with severe MR, severe AR, mild AS.

## 2018-11-18 NOTE — PROGRESS NOTE ADULT - PROBLEM SELECTOR PLAN 1
R ankle gout flare improving - not resolved  Cont on IV solumedrol 30mg daily for now  Holding off colchicine as per renal rec, CKD

## 2018-11-18 NOTE — PROGRESS NOTE ADULT - SUBJECTIVE AND OBJECTIVE BOX
Patient seen and examined at bedside.   Patient endorses dyspnea when he lays flat  No other new/acute complaints       Medications:  acetaminophen   Tablet .. 650 milliGRAM(s) Oral every 6 hours PRN  aspirin enteric coated 81 milliGRAM(s) Oral daily  buDESOnide 160 MICROgram(s)/formoterol 4.5 MICROgram(s) Inhaler 2 Puff(s) Inhalation two times a day  clopidogrel Tablet 75 milliGRAM(s) Oral daily  dextrose 40% Gel 15 Gram(s) Oral once PRN  dextrose 5%. 1000 milliLiter(s) IV Continuous <Continuous>  dextrose 50% Injectable 12.5 Gram(s) IV Push once  docusate sodium 100 milliGRAM(s) Oral three times a day  doxazosin 2 milliGRAM(s) Oral at bedtime  furosemide    Tablet 40 milliGRAM(s) Oral two times a day  heparin  Injectable 5000 Unit(s) SubCutaneous every 8 hours  hydrALAZINE 100 milliGRAM(s) Oral every 8 hours PRN  influenza   Vaccine 0.5 milliLiter(s) IntraMuscular once  insulin glargine Injectable (LANTUS) 5 Unit(s) SubCutaneous at bedtime  insulin lispro (HumaLOG) corrective regimen sliding scale   SubCutaneous three times a day before meals  insulin lispro (HumaLOG) corrective regimen sliding scale   SubCutaneous at bedtime  methylPREDNISolone sodium succinate Injectable 30 milliGRAM(s) IV Push daily  metoprolol tartrate 50 milliGRAM(s) Oral two times a day  NIFEdipine XL 90 milliGRAM(s) Oral daily  oxyCODONE    IR 5 milliGRAM(s) Oral every 4 hours PRN  pantoprazole    Tablet 40 milliGRAM(s) Oral before breakfast  senna 2 Tablet(s) Oral at bedtime  sertraline 200 milliGRAM(s) Oral daily  simvastatin 20 milliGRAM(s) Oral at bedtime      PAST MEDICAL & SURGICAL HISTORY:  HTN (hypertension)  CHF (congestive heart failure)  T2DM (type 2 diabetes mellitus)  HLD (hyperlipidemia)  No significant past surgical history        Vitals:  T(F): 98 (11-18), Max: 98.1 (11-17)  HR: 78 (11-18) (59 - 78)  BP: 153/73 (11-18) (153/73 - 168/61)  RR: 18 (11-18)  SpO2: 98% (11-18)  I&O's Summary    17 Nov 2018 07:01  -  18 Nov 2018 07:00  --------------------------------------------------------  IN: 676 mL / OUT: 1275 mL / NET: -599 mL    18 Nov 2018 07:01  -  18 Nov 2018 14:13  --------------------------------------------------------  IN: 0 mL / OUT: 300 mL / NET: -300 mL        Physical Exam:  Appearance: No acute distress; well appearing  Eyes: PERRL, EOMI, pink conjunctiva  HENT: Normal oral mucosa  Cardiovascular: RRR, S1, S2, no murmurs, rubs, or gallops; no edema; +JVP ~ 9 cm H20   Respiratory: Bibasilar crackles w/ no wheezing/rhonchi   Gastrointestinal: soft, non-tender, non-distended with normal bowel sounds  Musculoskeletal: No clubbing; no joint deformity   Neurologic: Non-focal  Lymphatic: No lymphadenopathy  Psychiatry: AAOx3, mood & affect appropriate  Skin: No rashes, ecchymoses, or cyanosis                          8.6    3.67  )-----------( 145      ( 18 Nov 2018 08:19 )             27.4     11-18    146<H>  |  106  |  67<H>  ----------------------------<  137<H>  3.6   |  25  |  2.37<H>    Ca    9.2      18 Nov 2018 07:09    Interpretation of Telemetry:  sinus rhythm, HR 60s-80s     < from: Transesophageal Echocardiogram (11.16.18 @ 09:08) >  1. Mitral annular calcification, mildly thickened leaflets  . There is a torn chordae seen associated with the lateral  aspect of the posteriorleaflet  Severe eccentric mitral  regurgitation.Late systolic reversal noted in the pulmonary  veins.  2. Calcified trileaflet aortic valve with decreased  opening. Estimated aortic valve area equals 1.8 sqcm (by  continuity equation), consistent with mild aortic stenosis.  Moderate-severe aortic regurgitation.  3. Severe left atrial enlargement.  No left atrial or left  atrial appendage thrombus.  4. Normal left ventricular systolic function.  5. Normal right ventricular size and function.  6. Normal tricuspid valve. Moderate tricuspid  regurgitation.  7. Normal pulmonic valve. Moderate pulmonic regurgitation.  Estimated pulmonary artery systolic pressure equals 69  mm  Hg, assuming right atrial pressure equals 8 mm Hg,  consistent with severe pulmonary pressures.    < end of copied text >      ASSESSMENT     83M w/ PMHx of CAD (s/p PCI x 6), reported "CHF" (nl LVSF on TTE, possibly valvular HF 2/2 severe MR), CKD (unknown baseline Cr), DM2, HTN, HLD, who was admitted on 11/10 w/ symptoms likely 2/2 valvular HF 2/2 severe MR. 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 The patient also has mod-severe AI, severe pHTN, severe LAE, mod TR, mod PI.   does have clinical evidence of hypervolemia in the setting of severe valvulopathy as stated above, which is concerning of the setting of LHC tomorrow and contrast load     RECS  - cont asa/plavix  - give IV lasix 80 mg x 1 dose, daily weights, I/Os (net goal ~ -1.5-2L)  - cont PO lasix 40 mg BID  - increase Nifedipine to 60 mg BID for better afterload reduction (SBP 170s)   - switch statin to atorvastatin 40 mg daily   - cont hydralazine 100 mg TID, metoprolol tart 50 mg BID      Re-assess volume status tomorrow prior to LHC.  Case discussed w/ Dr. Teetee Kulkarni MD   [facility]

## 2018-11-19 LAB
ALBUMIN SERPL ELPH-MCNC: 3.4 G/DL — SIGNIFICANT CHANGE UP (ref 3.3–5)
ANION GAP SERPL CALC-SCNC: 11 MMOL/L — SIGNIFICANT CHANGE UP (ref 5–17)
BUN SERPL-MCNC: 64 MG/DL — HIGH (ref 7–23)
CALCIUM SERPL-MCNC: 9.3 MG/DL — SIGNIFICANT CHANGE UP (ref 8.4–10.5)
CHLORIDE SERPL-SCNC: 102 MMOL/L — SIGNIFICANT CHANGE UP (ref 96–108)
CO2 SERPL-SCNC: 30 MMOL/L — SIGNIFICANT CHANGE UP (ref 22–31)
CREAT SERPL-MCNC: 2.33 MG/DL — HIGH (ref 0.5–1.3)
CULTURE RESULTS: SIGNIFICANT CHANGE UP
CULTURE RESULTS: SIGNIFICANT CHANGE UP
GLUCOSE BLDC GLUCOMTR-MCNC: 149 MG/DL — HIGH (ref 70–99)
GLUCOSE BLDC GLUCOMTR-MCNC: 157 MG/DL — HIGH (ref 70–99)
GLUCOSE BLDC GLUCOMTR-MCNC: 171 MG/DL — HIGH (ref 70–99)
GLUCOSE BLDC GLUCOMTR-MCNC: 268 MG/DL — HIGH (ref 70–99)
GLUCOSE SERPL-MCNC: 142 MG/DL — HIGH (ref 70–99)
HCT VFR BLD CALC: 26.8 % — LOW (ref 39–50)
HGB BLD-MCNC: 8.5 G/DL — LOW (ref 13–17)
MAGNESIUM SERPL-MCNC: 2.5 MG/DL — SIGNIFICANT CHANGE UP (ref 1.6–2.6)
MCHC RBC-ENTMCNC: 29.3 PG — SIGNIFICANT CHANGE UP (ref 27–34)
MCHC RBC-ENTMCNC: 31.7 GM/DL — LOW (ref 32–36)
MCV RBC AUTO: 92.4 FL — SIGNIFICANT CHANGE UP (ref 80–100)
PHOSPHATE SERPL-MCNC: 3.6 MG/DL — SIGNIFICANT CHANGE UP (ref 2.5–4.5)
PLATELET # BLD AUTO: 159 K/UL — SIGNIFICANT CHANGE UP (ref 150–400)
POTASSIUM SERPL-MCNC: 3.5 MMOL/L — SIGNIFICANT CHANGE UP (ref 3.5–5.3)
POTASSIUM SERPL-SCNC: 3.5 MMOL/L — SIGNIFICANT CHANGE UP (ref 3.5–5.3)
RBC # BLD: 2.9 M/UL — LOW (ref 4.2–5.8)
RBC # FLD: 14.8 % — HIGH (ref 10.3–14.5)
SODIUM SERPL-SCNC: 143 MMOL/L — SIGNIFICANT CHANGE UP (ref 135–145)
SPECIMEN SOURCE: SIGNIFICANT CHANGE UP
SPECIMEN SOURCE: SIGNIFICANT CHANGE UP
WBC # BLD: 3.98 K/UL — SIGNIFICANT CHANGE UP (ref 3.8–10.5)
WBC # FLD AUTO: 3.98 K/UL — SIGNIFICANT CHANGE UP (ref 3.8–10.5)

## 2018-11-19 PROCEDURE — 76937 US GUIDE VASCULAR ACCESS: CPT | Mod: 26,GC

## 2018-11-19 PROCEDURE — 99231 SBSQ HOSP IP/OBS SF/LOW 25: CPT | Mod: GC

## 2018-11-19 PROCEDURE — 93456 R HRT CORONARY ARTERY ANGIO: CPT | Mod: 26,GC

## 2018-11-19 PROCEDURE — 99233 SBSQ HOSP IP/OBS HIGH 50: CPT | Mod: GC

## 2018-11-19 PROCEDURE — 99233 SBSQ HOSP IP/OBS HIGH 50: CPT

## 2018-11-19 RX ADMIN — Medication 60 MILLIGRAM(S): at 05:06

## 2018-11-19 RX ADMIN — Medication 50 MILLIGRAM(S): at 05:06

## 2018-11-19 RX ADMIN — Medication 600 MILLIGRAM(S): at 21:53

## 2018-11-19 RX ADMIN — PANTOPRAZOLE SODIUM 40 MILLIGRAM(S): 20 TABLET, DELAYED RELEASE ORAL at 05:06

## 2018-11-19 RX ADMIN — Medication 50 MILLIGRAM(S): at 21:54

## 2018-11-19 RX ADMIN — Medication 600 MILLIGRAM(S): at 08:26

## 2018-11-19 RX ADMIN — CLOPIDOGREL BISULFATE 75 MILLIGRAM(S): 75 TABLET, FILM COATED ORAL at 12:26

## 2018-11-19 RX ADMIN — HEPARIN SODIUM 5000 UNIT(S): 5000 INJECTION INTRAVENOUS; SUBCUTANEOUS at 05:06

## 2018-11-19 RX ADMIN — Medication 30 MILLIGRAM(S): at 05:06

## 2018-11-19 RX ADMIN — Medication 100 MILLIGRAM(S): at 21:54

## 2018-11-19 RX ADMIN — BUDESONIDE AND FORMOTEROL FUMARATE DIHYDRATE 2 PUFF(S): 160; 4.5 AEROSOL RESPIRATORY (INHALATION) at 21:53

## 2018-11-19 RX ADMIN — Medication 1: at 08:26

## 2018-11-19 RX ADMIN — Medication 60 MILLIGRAM(S): at 21:54

## 2018-11-19 RX ADMIN — SIMVASTATIN 20 MILLIGRAM(S): 20 TABLET, FILM COATED ORAL at 21:54

## 2018-11-19 RX ADMIN — Medication 81 MILLIGRAM(S): at 12:26

## 2018-11-19 RX ADMIN — Medication 3: at 12:25

## 2018-11-19 RX ADMIN — BUDESONIDE AND FORMOTEROL FUMARATE DIHYDRATE 2 PUFF(S): 160; 4.5 AEROSOL RESPIRATORY (INHALATION) at 05:06

## 2018-11-19 RX ADMIN — SERTRALINE 200 MILLIGRAM(S): 25 TABLET, FILM COATED ORAL at 12:26

## 2018-11-19 RX ADMIN — SENNA PLUS 2 TABLET(S): 8.6 TABLET ORAL at 21:54

## 2018-11-19 RX ADMIN — Medication 2 MILLIGRAM(S): at 21:54

## 2018-11-19 RX ADMIN — INSULIN GLARGINE 5 UNIT(S): 100 INJECTION, SOLUTION SUBCUTANEOUS at 22:18

## 2018-11-19 NOTE — PROGRESS NOTE ADULT - SUBJECTIVE AND OBJECTIVE BOX
Patient is a 83y old  Male who presents with a chief complaint of respiratory distress, SOB, chest pain (19 Nov 2018 09:52)        SUBJECTIVE / OVERNIGHT EVENTS:  Feels ok. tired. poor sleep.  Right ankle and back pain improved.   afebrile. no SOB or chest pain.     MEDICATIONS  (STANDING):  acetylcysteine  Oral Solution 600 milliGRAM(s) Oral every 12 hours  aspirin enteric coated 81 milliGRAM(s) Oral daily  buDESOnide 160 MICROgram(s)/formoterol 4.5 MICROgram(s) Inhaler 2 Puff(s) Inhalation two times a day  clopidogrel Tablet 75 milliGRAM(s) Oral daily  dextrose 5%. 1000 milliLiter(s) (50 mL/Hr) IV Continuous <Continuous>  dextrose 50% Injectable 12.5 Gram(s) IV Push once  docusate sodium 100 milliGRAM(s) Oral three times a day  doxazosin 2 milliGRAM(s) Oral at bedtime  heparin  Injectable 5000 Unit(s) SubCutaneous every 8 hours  influenza   Vaccine 0.5 milliLiter(s) IntraMuscular once  insulin glargine Injectable (LANTUS) 5 Unit(s) SubCutaneous at bedtime  insulin lispro (HumaLOG) corrective regimen sliding scale   SubCutaneous three times a day before meals  insulin lispro (HumaLOG) corrective regimen sliding scale   SubCutaneous at bedtime  methylPREDNISolone sodium succinate Injectable 30 milliGRAM(s) IV Push daily  metoprolol tartrate 50 milliGRAM(s) Oral two times a day  NIFEdipine XL 60 milliGRAM(s) Oral <User Schedule>  pantoprazole    Tablet 40 milliGRAM(s) Oral before breakfast  senna 2 Tablet(s) Oral at bedtime  sertraline 200 milliGRAM(s) Oral daily  simvastatin 20 milliGRAM(s) Oral at bedtime    MEDICATIONS  (PRN):  acetaminophen   Tablet .. 650 milliGRAM(s) Oral every 6 hours PRN Temp greater or equal to 38C (100.4F)  dextrose 40% Gel 15 Gram(s) Oral once PRN Blood Glucose LESS THAN 70 milliGRAM(s)/deciliter  hydrALAZINE 100 milliGRAM(s) Oral every 8 hours PRN Systolic blood pressure > 170  oxyCODONE    IR 5 milliGRAM(s) Oral every 4 hours PRN Severe Pain (7 - 10)      Vital Signs Last 24 Hrs  T(C): 36.4 (19 Nov 2018 11:27), Max: 36.8 (19 Nov 2018 04:46)  T(F): 97.5 (19 Nov 2018 11:27), Max: 98.2 (19 Nov 2018 04:46)  HR: 58 (19 Nov 2018 12:17) (53 - 72)  BP: 141/53 (19 Nov 2018 12:17) (136/64 - 178/66)  BP(mean): --  RR: 18 (19 Nov 2018 11:27) (16 - 18)  SpO2: 100% (19 Nov 2018 12:17) (98% - 100%)  CAPILLARY BLOOD GLUCOSE      POCT Blood Glucose.: 268 mg/dL (19 Nov 2018 11:57)  POCT Blood Glucose.: 171 mg/dL (19 Nov 2018 07:45)  POCT Blood Glucose.: 175 mg/dL (18 Nov 2018 21:25)  POCT Blood Glucose.: 214 mg/dL (18 Nov 2018 16:45)    I&O's Summary    18 Nov 2018 07:01  -  19 Nov 2018 07:00  --------------------------------------------------------  IN: 360 mL / OUT: 2550 mL / NET: -2190 mL    19 Nov 2018 07:01  -  19 Nov 2018 13:10  --------------------------------------------------------  IN: 240 mL / OUT: 0 mL / NET: 240 mL          PHYSICAL EXAM  GENERAL: NAD, well-developed  HEAD:  Atraumatic, Normocephalic  EYES: EOMI, conjunctiva and sclera clear  NECK: Supple, No JVD  CHEST/LUNG: bibasilar crackles.; No wheeze  HEART: Regular rate and rhythm; systolic murmur  ABDOMEN: Soft, Nontender, Nondistended; Bowel sounds present  EXTREMITIES:  No clubbing, cyanosis, or edema. right ankle without erythema or swelling. nontender  PSYCH: AAOx3  SKIN: No rashes or lesions    LABS:                        8.5    3.98  )-----------( 159      ( 19 Nov 2018 09:05 )             26.8     11-19    143  |  102  |  64<H>  ----------------------------<  142<H>  3.5   |  30  |  2.33<H>    Ca    9.3      19 Nov 2018 06:00  Phos  3.6     11-19  Mg     2.5     11-19    TPro  x   /  Alb  3.4  /  TBili  x   /  DBili  x   /  AST  x   /  ALT  x   /  AlkPhos  x   11-19                RADIOLOGY & ADDITIONAL TESTS:    Imaging Personally Reviewed:  Consultant(s) Notes Reviewed:    Care Discussed with Consultants/Other Providers:

## 2018-11-19 NOTE — PROGRESS NOTE ADULT - PROBLEM SELECTOR PLAN 1
R ankle gout flare improved. steroid taper as per rheum   Holding off colchicine as per renal rec, CKD

## 2018-11-19 NOTE — PROGRESS NOTE ADULT - SUBJECTIVE AND OBJECTIVE BOX
Patient seen in follow up for CKD. Cardiac cath is scheduled tentatively for today, conversant, not dyspneic at rest.      MEDICATIONS  (STANDING):  aspirin enteric coated 81 milliGRAM(s) Oral daily  buDESOnide 160 MICROgram(s)/formoterol 4.5 MICROgram(s) Inhaler 2 Puff(s) Inhalation two times a day  clopidogrel Tablet 75 milliGRAM(s) Oral daily  dextrose 5%. 1000 milliLiter(s) (50 mL/Hr) IV Continuous <Continuous>  dextrose 50% Injectable 12.5 Gram(s) IV Push once  docusate sodium 100 milliGRAM(s) Oral three times a day  doxazosin 2 milliGRAM(s) Oral at bedtime  furosemide    Tablet 40 milliGRAM(s) Oral two times a day  heparin  Injectable 5000 Unit(s) SubCutaneous every 8 hours  influenza   Vaccine 0.5 milliLiter(s) IntraMuscular once  insulin glargine Injectable (LANTUS) 5 Unit(s) SubCutaneous at bedtime  insulin lispro (HumaLOG) corrective regimen sliding scale   SubCutaneous three times a day before meals  insulin lispro (HumaLOG) corrective regimen sliding scale   SubCutaneous at bedtime  methylPREDNISolone sodium succinate Injectable 30 milliGRAM(s) IV Push daily  metoprolol tartrate 50 milliGRAM(s) Oral two times a day  NIFEdipine XL 60 milliGRAM(s) Oral <User Schedule>  pantoprazole    Tablet 40 milliGRAM(s) Oral before breakfast  senna 2 Tablet(s) Oral at bedtime  sertraline 200 milliGRAM(s) Oral daily  simvastatin 20 milliGRAM(s) Oral at bedtime    MEDICATIONS  (PRN):  acetaminophen   Tablet .. 650 milliGRAM(s) Oral every 6 hours PRN Temp greater or equal to 38C (100.4F)  dextrose 40% Gel 15 Gram(s) Oral once PRN Blood Glucose LESS THAN 70 milliGRAM(s)/deciliter  hydrALAZINE 100 milliGRAM(s) Oral every 8 hours PRN Systolic blood pressure > 170  oxyCODONE    IR 5 milliGRAM(s) Oral every 4 hours PRN Severe Pain (7 - 10)    T(C): 36.7 (11-18-18 @ 11:57), Max: 36.8 (11-17-18 @ 04:24)  HR: 78 (11-18-18 @ 11:57) (54 - 78)  BP: 153/73 (11-18-18 @ 11:57) (143/55 - 168/61)  RR: 18 (11-18-18 @ 11:57) (18 - 18)  SpO2: 98% (11-18-18 @ 11:57) (96% - 99%)  Wt(kg): --  I&O's Detail    17 Nov 2018 07:01  -  18 Nov 2018 07:00  --------------------------------------------------------  IN:    Oral Fluid: 676 mL  Total IN: 676 mL    OUT:    Voided: 1275 mL  Total OUT: 1275 mL    Total NET: -599 mL      18 Nov 2018 07:01  -  18 Nov 2018 15:26  --------------------------------------------------------  IN:  Total IN: 0 mL    OUT:    Voided: 300 mL  Total OUT: 300 mL    Total NET: -300 mL      PHYSICAL EXAM:  General: NAD  Respiratory: b/l air entry, crackles at bases  Cardiovascular: S1 S2 reg, SM present  Gastrointestinal: soft, no bladder distension  Extremities: no edema    CBC Full  -  ( 18 Nov 2018 08:19 )  WBC Count : 3.67 K/uL  Hemoglobin : 8.6 g/dL  Hematocrit : 27.4 %  Platelet Count - Automated : 145 K/uL  Mean Cell Volume : 92.6 fl  Mean Cell Hemoglobin : 29.1 pg  Mean Cell Hemoglobin Concentration : 31.4 gm/dL  Auto Neutrophil # : x  Auto Lymphocyte # : x  Auto Monocyte # : x  Auto Eosinophil # : x  Auto Basophil # : x  Auto Neutrophil % : x  Auto Lymphocyte % : x  Auto Monocyte % : x  Auto Eosinophil % : x  Auto Basophil % : x    11-18    146<H>  |  106  |  67<H>  ----------------------------<  137<H>  3.6   |  25  |  2.37<H>    Ca    9.2      18 Nov 2018 07:09      Sodium, Serum: 146 (11-18 @ 07:09)  Sodium, Serum: 143 (11-17 @ 06:12)  Sodium, Serum: 143 (11-16 @ 06:12)    Creatinine, Serum: 2.37 (11-18 @ 07:09)  Creatinine, Serum: 2.33 (11-17 @ 06:12)  Creatinine, Serum: 2.43 (11-16 @ 06:12)    Potassium, Serum: 3.6 (11-18 @ 07:09)  Potassium, Serum: 3.5 (11-17 @ 06:12)  Potassium, Serum: 3.4 (11-16 @ 06:12)    Hemoglobin: 8.6 (11-18 @ 08:19)  Hemoglobin: 8.1 (11-17 @ 08:08)  Hemoglobin: 8.7 (11-16 @ 07:42) Patient seen in follow up for CKD. Cardiac cath today, conversant, not dyspneic at rest.    Vital Signs Last 24 Hrs  T(C): 36.4 (11-19-18 @ 11:27), Max: 36.8 (11-19-18 @ 04:46)  T(F): 97.5 (11-19-18 @ 11:27), Max: 98.2 (11-19-18 @ 04:46)  HR: 58 (11-19-18 @ 12:17) (53 - 72)  BP: 141/53 (11-19-18 @ 12:17) (136/64 - 178/66)  RR: 18 (11-19-18 @ 11:27) (16 - 18)  SpO2: 100% (11-19-18 @ 12:17) (98% - 100%)    PHYSICAL EXAM:  General: NAD  Respiratory: b/l air entry, crackles at bases  Cardiovascular: S1 S2 reg, SM present  Gastrointestinal: soft, no bladder distension  Extremities: no edema                        8.5    3.98  )-----------( 159      ( 19 Nov 2018 09:05 )             26.8     19 Nov 2018 06:00    143    |  102    |  64     ----------------------------<  142    3.5     |  30     |  2.33     Ca    9.3        19 Nov 2018 06:00  Phos  3.6       19 Nov 2018 06:00  Mg     2.5       19 Nov 2018 06:00    TPro  x      /  Alb  3.4    /  TBili  x      /  DBili  x      /  AST  x      /  ALT  x      /  AlkPhos  x      19 Nov 2018 06:00    LIVER FUNCTIONS - ( 19 Nov 2018 06:00 )  Alb: 3.4 g/dL / Pro: x     / ALK PHOS: x     / ALT: x     / AST: x     / GGT: x           acetaminophen   Tablet .. 650 milliGRAM(s) Oral every 6 hours PRN  acetylcysteine  Oral Solution 600 milliGRAM(s) Oral every 12 hours  aspirin enteric coated 81 milliGRAM(s) Oral daily  buDESOnide 160 MICROgram(s)/formoterol 4.5 MICROgram(s) Inhaler 2 Puff(s) Inhalation two times a day  clopidogrel Tablet 75 milliGRAM(s) Oral daily  dextrose 40% Gel 15 Gram(s) Oral once PRN  dextrose 5%. 1000 milliLiter(s) IV Continuous <Continuous>  dextrose 50% Injectable 12.5 Gram(s) IV Push once  docusate sodium 100 milliGRAM(s) Oral three times a day  doxazosin 2 milliGRAM(s) Oral at bedtime  heparin  Injectable 5000 Unit(s) SubCutaneous every 8 hours  hydrALAZINE 100 milliGRAM(s) Oral every 8 hours PRN  influenza   Vaccine 0.5 milliLiter(s) IntraMuscular once  insulin glargine Injectable (LANTUS) 5 Unit(s) SubCutaneous at bedtime  insulin lispro (HumaLOG) corrective regimen sliding scale   SubCutaneous three times a day before meals  insulin lispro (HumaLOG) corrective regimen sliding scale   SubCutaneous at bedtime  metoprolol tartrate 50 milliGRAM(s) Oral two times a day  NIFEdipine XL 60 milliGRAM(s) Oral <User Schedule>  oxyCODONE    IR 5 milliGRAM(s) Oral every 4 hours PRN  pantoprazole    Tablet 40 milliGRAM(s) Oral before breakfast  senna 2 Tablet(s) Oral at bedtime  sertraline 200 milliGRAM(s) Oral daily  simvastatin 20 milliGRAM(s) Oral at bedtime    A/P:  CM, CHF, severe MR  CKD, baseline Cr ~ 2.1 per daughter  Cath today  Cr stable  Hold Lasix  Avoid ACE, ARB  BMP in am  D/w daughter at bedside

## 2018-11-19 NOTE — PROGRESS NOTE ADULT - PROBLEM SELECTOR PLAN 4
- 2/2 pulm edema which is improving  - cont diuresis as above  - trial and monitor off oxygen, off Bipap.   - CT chest showed b/l pleural effusions, pulmonary edema on admission.   - hold off antibiotics, as pt does not endorse s/s active infection/URI/PNA symptoms

## 2018-11-19 NOTE — DIETITIAN INITIAL EVALUATION ADULT. - OTHER INFO
Patient seen for Length Of Stay on 4MON. Reports UBW as 190-200lbs which is consistent with current weight of 194lbs. Since admission reports fair appetite, completing % of meals. RD offered to obtain food preferences but pt declined, assisted pt with menu selection. Denies any acute GI distress, last BM 11/18 per pt report. Pt declined need for carbohydrate consistent diet education but was amendable to low sodium diet education.

## 2018-11-19 NOTE — DIETITIAN INITIAL EVALUATION ADULT. - ADHERENCE
Pt noted with with T2DM, checks BG 1x per day with values between 115-130mg/dlL, HbA1c: 4.7% suggesting good glycemic control. Pt also soda and juice in the diet and does not add salt to foods.

## 2018-11-19 NOTE — PROGRESS NOTE ADULT - SUBJECTIVE AND OBJECTIVE BOX
JANE VILLEDA  [id number]    INTERVAL HPI/OVERNIGHT EVENTS:    Pt states his ankle pain is better- able to ambulate more distances now  No other new joint pain or swelling    MEDICATIONS  (STANDING):  acetylcysteine  Oral Solution 600 milliGRAM(s) Oral every 12 hours  aspirin enteric coated 81 milliGRAM(s) Oral daily  buDESOnide 160 MICROgram(s)/formoterol 4.5 MICROgram(s) Inhaler 2 Puff(s) Inhalation two times a day  clopidogrel Tablet 75 milliGRAM(s) Oral daily  dextrose 5%. 1000 milliLiter(s) (50 mL/Hr) IV Continuous <Continuous>  dextrose 50% Injectable 12.5 Gram(s) IV Push once  docusate sodium 100 milliGRAM(s) Oral three times a day  doxazosin 2 milliGRAM(s) Oral at bedtime  heparin  Injectable 5000 Unit(s) SubCutaneous every 8 hours  influenza   Vaccine 0.5 milliLiter(s) IntraMuscular once  insulin glargine Injectable (LANTUS) 5 Unit(s) SubCutaneous at bedtime  insulin lispro (HumaLOG) corrective regimen sliding scale   SubCutaneous three times a day before meals  insulin lispro (HumaLOG) corrective regimen sliding scale   SubCutaneous at bedtime  methylPREDNISolone sodium succinate Injectable 30 milliGRAM(s) IV Push daily  metoprolol tartrate 50 milliGRAM(s) Oral two times a day  NIFEdipine XL 60 milliGRAM(s) Oral <User Schedule>  pantoprazole    Tablet 40 milliGRAM(s) Oral before breakfast  senna 2 Tablet(s) Oral at bedtime  sertraline 200 milliGRAM(s) Oral daily  simvastatin 20 milliGRAM(s) Oral at bedtime    MEDICATIONS  (PRN):  acetaminophen   Tablet .. 650 milliGRAM(s) Oral every 6 hours PRN Temp greater or equal to 38C (100.4F)  dextrose 40% Gel 15 Gram(s) Oral once PRN Blood Glucose LESS THAN 70 milliGRAM(s)/deciliter  hydrALAZINE 100 milliGRAM(s) Oral every 8 hours PRN Systolic blood pressure > 170  oxyCODONE    IR 5 milliGRAM(s) Oral every 4 hours PRN Severe Pain (7 - 10)      Allergies    No Known Allergies    Intolerances      Vital Signs Last 24 Hrs  T(C): 36.4 (19 Nov 2018 11:27), Max: 36.8 (19 Nov 2018 04:46)  T(F): 97.5 (19 Nov 2018 11:27), Max: 98.2 (19 Nov 2018 04:46)  HR: 58 (19 Nov 2018 12:17) (53 - 72)  BP: 141/53 (19 Nov 2018 12:17) (136/64 - 178/66)  BP(mean): --  RR: 18 (19 Nov 2018 11:27) (16 - 18)  SpO2: 100% (19 Nov 2018 12:17) (98% - 100%)    Physical Exam:  General: No apparent distress  MSK: right ankle and first MTP with improved erythema, swelling with mild TTP  and increased ROM compared to last week's exam  No other joint abnormality appreciated      LABS:                        8.5    3.98  )-----------( 159      ( 19 Nov 2018 09:05 )             26.8     11-19    143  |  102  |  64<H>  ----------------------------<  142<H>  3.5   |  30  |  2.33<H>    Ca    9.3      19 Nov 2018 06:00  Phos  3.6     11-19  Mg     2.5     11-19    TPro  x   /  Alb  3.4  /  TBili  x   /  DBili  x   /  AST  x   /  ALT  x   /  AlkPhos  x   11-19            RADIOLOGY & ADDITIONAL TESTS:  < from: Xray Lumbar Spine AP + Lateral (11.16.18 @ 22:59) >  CLINICAL INFORMATION: Low back pain     IMPRESSION:     Vertebral body heights are maintained. Lumbar dextroscoliosis. Alignment   is otherwise normal. Multilevel spondylosis characterized by varying   levels of disc height loss and marginal osteophyte formation. Mild   bilateral sacroiliac joint arthrosis.    < from: Xray Hip 3-4 Views, Bilateral (11.16.18 @ 22:58) >  IMPRESSION:     No fracture or dislocation. Preserved hip joint spaces.

## 2018-11-19 NOTE — PROGRESS NOTE ADULT - PROBLEM SELECTOR PLAN 1
Mitraclip evaluation in progress  Cardiac cath this afternoon  MARBIN under review for final determination of feasibility of mitraclip. If deemed anatomically suitable, will schedule clip as outpt.    [id number]

## 2018-11-19 NOTE — DIETITIAN INITIAL EVALUATION ADULT. - NS AS NUTRI INTERV ED CONTENT
Pt declined diet education on carbohydrate consistent diet. provided diet education for heart failure nutrition therapy. discussed importance of avoiding salt and high sodium containing foods. patient made aware of need to monitor and restrict fluid intake, and importance/purpose of daily weights./Purpose of the nutrition education

## 2018-11-19 NOTE — DIETITIAN INITIAL EVALUATION ADULT. - PROBLEM SELECTOR PLAN 4
- unknown baseline Cr  - cont jimenez for now  - diuresis as above  - urine lytes  - renal u/s. also to eval right flank pain.  - percocet PRN for chronic back pain

## 2018-11-19 NOTE — DIETITIAN INITIAL EVALUATION ADULT. - ENERGY NEEDS
Ht: 70 inches , Wt: 195.8lbs, BMI: 28.2kg/m2, IBW: 166lbs +/- 10%, %IBW: 118%  Edema: +1 right/left foot, Skin: free of pressure injuries per nursing flow sheets    Pertinent Information: 83M, trilingual Wolof/Romanian speaking, c hx CAD s/p 6 stents (last stent 6 years ago), CHF, CKD (unknown baseline), DM2, HTN, HLD, chronic R lower back pain and chronic suprapubic pain, former smoker, presents with 3 days of increasing SOB, leg swelling, and chest pain.

## 2018-11-19 NOTE — DIETITIAN INITIAL EVALUATION ADULT. - PROBLEM SELECTOR PLAN 8
- pt's bmp glc is 120s.  - pt states he has been giving himself variable lantus, but usually around 7U a night. but he didn't give himself anything lantus last night.  - check a1c, tsh, lipids

## 2018-11-19 NOTE — PROGRESS NOTE ADULT - ASSESSMENT
83M, trilingual [place]ian speaking, c hx CAD s/p 6 stents (last stent 6 years ago), CHF, CKD (unknown baseline), DM2, HTN, HLD, chronic R lower back pain and chronic suprapubic pain, former smoker, pw hypoxic resp failure 2/2 pulm edema likely 2/2 CHF exacerbation with severe MR, severe AR, mild AS.

## 2018-11-19 NOTE — PROGRESS NOTE ADULT - SUBJECTIVE AND OBJECTIVE BOX
Cardiology Progress Note    HPI:  83M, trilingual Portuguese/Kyrgyz speaking, c hx CAD s/p 6 stents (last stent 6 years ago), CHF, CKD (unknown baseline), DM2, HTN, HLD, chronic R lower back pain and chronic suprapubic pain, former smoker, presents with 3 days of increasing SOB, leg swelling, and chest pain.    History obtained from patient. At baseline, pt ambulates with a walker, and has significant difficulty ambulating up a flight of stairs. He has been taking all of his medications except the lasix for the past 4 days, because he was urinating too often. Pt [place] in [facility] and [place] to [facility] in [place], but since his wife passed away 1.5 months ago, has been living with his daughter Mary Verdin in [place]. Pt was last hospitalized about 6-7 months ago with the same SOB. Over the past 3 days, reports increasing SOB, respiratory distress, leg swelling, some right chest pain that radiates to b/l arms, and some right flank pain that started yesterday. Denies fevers, chills, URI symptoms. Not on O2 at home. He has had 4 falls in 2018, but not recently. BM yesterday.    VS: Tm 98.8, P 105, /71, R 32, 85% RA when I saw the patient. 100% on bipap  In the ED, received azithro, ceftri, lasix 80IV, tylenol, NTG    ISTOP Reference #: [id number]  Rx Written	Rx Dispensed	Drug	Quantity	Days Supply	Prescriber Name  10/08/2018	10/09/2018	oxycodone-acetaminophen 5-325 mg tab	60	30	Anthony Arellano MD  08/28/2018	09/10/2018	oxycodone-acetaminophen 5-325 mg tab	60	30	Anthony Arellano MD  08/29/2018	08/30/2018	lorazepam 1 mg tablet	60	30	Allan oMnreal (10 Nov 2018 04:47)      Interval events: Pt noted stable resp sx but still sob. Pt denied chest pain.    Tele:    Medications:  acetaminophen   Tablet .. 650 milliGRAM(s) Oral every 6 hours PRN  acetylcysteine  Oral Solution 600 milliGRAM(s) Oral every 12 hours  aspirin enteric coated 81 milliGRAM(s) Oral daily  buDESOnide 160 MICROgram(s)/formoterol 4.5 MICROgram(s) Inhaler 2 Puff(s) Inhalation two times a day  clopidogrel Tablet 75 milliGRAM(s) Oral daily  dextrose 40% Gel 15 Gram(s) Oral once PRN  dextrose 5%. 1000 milliLiter(s) IV Continuous <Continuous>  dextrose 50% Injectable 12.5 Gram(s) IV Push once  docusate sodium 100 milliGRAM(s) Oral three times a day  doxazosin 2 milliGRAM(s) Oral at bedtime  heparin  Injectable 5000 Unit(s) SubCutaneous every 8 hours  hydrALAZINE 100 milliGRAM(s) Oral every 8 hours PRN  influenza   Vaccine 0.5 milliLiter(s) IntraMuscular once  insulin glargine Injectable (LANTUS) 5 Unit(s) SubCutaneous at bedtime  insulin lispro (HumaLOG) corrective regimen sliding scale   SubCutaneous three times a day before meals  insulin lispro (HumaLOG) corrective regimen sliding scale   SubCutaneous at bedtime  methylPREDNISolone sodium succinate Injectable 30 milliGRAM(s) IV Push daily  metoprolol tartrate 50 milliGRAM(s) Oral two times a day  NIFEdipine XL 60 milliGRAM(s) Oral <User Schedule>  oxyCODONE    IR 5 milliGRAM(s) Oral every 4 hours PRN  pantoprazole    Tablet 40 milliGRAM(s) Oral before breakfast  senna 2 Tablet(s) Oral at bedtime  sertraline 200 milliGRAM(s) Oral daily  simvastatin 20 milliGRAM(s) Oral at bedtime      Review of Systems:  Constitutional: [ ] Fever [ ] Chills [ ] Fatigue [ ] Weight change   HEENT: [ ] Blurred vision [ ] Eye Pain [ ] Headache [ ] Runny nose [ ] Sore Throat   Respiratory: [ ] Cough [ ] Wheezing [ ] Shortness of breath  Cardiovascular: [ ] Chest Pain [ ] Palpitations [ ] FLOREZ [ ] PND [ ] Orthopnea  Gastrointestinal: [ ] Abdominal Pain [ ] Diarrhea [ ] Constipation [ ] Hemorrhoids [ ] Nausea [ ] Vomiting  Genitourinary: [ ] Nocturia [ ] Dysuria [ ] Incontinence  Extremities: [ ] Swelling [ ] Joint Pain  Neurologic: [ ] Focal deficit [ ] Paresthesias [ ] Syncope  Lymphatic: [ ] Swelling [ ] Lymphadenopathy   Skin: [ ] Rash [ ] Ecchymoses [ ] Wounds [ ] Lesions  Psychiatry: [ ] Depression [ ] Suicidal/Homicidal Ideation [ ] Anxiety [ ] Sleep Disturbances  [ ] 10 point review of systems is otherwise negative except as mentioned above            [ ]Unable to obtain    Vitals:  T(C): 36.8 (11-19-18 @ 04:46), Max: 36.8 (11-19-18 @ 04:46)  HR: 57 (11-19-18 @ 04:46) (57 - 78)  BP: 153/63 (11-19-18 @ 04:46) (151/63 - 178/66)  BP(mean): --  RR: 18 (11-19-18 @ 04:46) (18 - 18)  SpO2: 98% (11-19-18 @ 04:46) (98% - 99%)  Wt(kg): --  Daily     Daily   I&O's Summary    18 Nov 2018 07:01  -  19 Nov 2018 07:00  --------------------------------------------------------  IN: 360 mL / OUT: 2550 mL / NET: -2190 mL    19 Nov 2018 07:01  -  19 Nov 2018 09:52  --------------------------------------------------------  IN: 240 mL / OUT: 0 mL / NET: 240 mL        Physical Exam:  NAD  PERRL, EOMI  Normal oral [place] NC/AT  S1, S2, RRR, No m/r/g appreciated, No edema, no elevation in JVP  Clear to auscultation bilaterally  Soft, Non-tender, Non-distended, BS+  No clubbing, No joint deformity   Non-focal  No lymphadenopathy  AAOx3, Mood & affect appropriate  No rashes, No ecchymoses, No cyanosis  Procedural Access Site: No hematoma, Non-tender to palpation, 2+ pulse , No bruit, No Ecchymosis    Labs:                        8.5    3.98  )-----------( 159      ( 19 Nov 2018 09:05 )             26.8     11-19    143  |  102  |  64<H>  ----------------------------<  142<H>  3.5   |  30  |  2.33<H>    Ca    9.3      19 Nov 2018 06:00  Phos  3.6     11-19  Mg     2.5     11-19    TPro  x   /  Alb  3.4  /  TBili  x   /  DBili  x   /  AST  x   /  ALT  x   /  AlkPhos  x   11-19                  New results/imaging: Cardiology Progress Note    HPI:  83M, trilingual Nepalese/Bengali speaking, c hx CAD s/p 6 stents (last stent 6 years ago), CHF, CKD (unknown baseline), DM2, HTN, HLD, chronic R lower back pain and chronic suprapubic pain, former smoker, presents with 3 days of increasing SOB, leg swelling, and chest pain.    History obtained from patient. At baseline, pt ambulates with a walker, and has significant difficulty ambulating up a flight of stairs. He has been taking all of his medications except the lasix for the past 4 days, because he was urinating too often. Pt normally lives in [facility] and goes to [facility] in [place], but since his wife passed away 1.5 months ago, has been living with his daughter Mary Verdin in [place]. Pt was last hospitalized about 6-7 months ago with the same SOB. Over the past 3 days, reports increasing SOB, respiratory distress, leg swelling, some right chest pain that radiates to b/l arms, and some right flank pain that started yesterday. Denies fevers, chills, URI symptoms. Not on O2 at home. He has had 4 falls in 2018, but not recently. BM yesterday.    VS: Tm 98.8, P 105, /71, R 32, 85% RA when I saw the patient. 100% on bipap  In the ED, received azithro, ceftri, lasix 80IV, tylenol, NTG    ISTOP Reference #: [id number]  Rx Written	Rx Dispensed	Drug	Quantity	Days Supply	Prescriber Name  10/08/2018	10/09/2018	oxycodone-acetaminophen 5-325 mg tab	60	30	Anthony Arellano MD  08/28/2018	09/10/2018	oxycodone-acetaminophen 5-325 mg tab	60	30	Anthony Arellano MD  08/29/2018	08/30/2018	lorazepam 1 mg tablet	60	30	Allan Monreal (10 Nov 2018 04:47)      Interval events: Pt noted stable resp sx but still sob. Pt denied chest pain.    Tele:    Medications:  acetaminophen   Tablet .. 650 milliGRAM(s) Oral every 6 hours PRN  acetylcysteine  Oral Solution 600 milliGRAM(s) Oral every 12 hours  aspirin enteric coated 81 milliGRAM(s) Oral daily  buDESOnide 160 MICROgram(s)/formoterol 4.5 MICROgram(s) Inhaler 2 Puff(s) Inhalation two times a day  clopidogrel Tablet 75 milliGRAM(s) Oral daily  dextrose 40% Gel 15 Gram(s) Oral once PRN  dextrose 5%. 1000 milliLiter(s) IV Continuous <Continuous>  dextrose 50% Injectable 12.5 Gram(s) IV Push once  docusate sodium 100 milliGRAM(s) Oral three times a day  doxazosin 2 milliGRAM(s) Oral at bedtime  heparin  Injectable 5000 Unit(s) SubCutaneous every 8 hours  hydrALAZINE 100 milliGRAM(s) Oral every 8 hours PRN  influenza   Vaccine 0.5 milliLiter(s) IntraMuscular once  insulin glargine Injectable (LANTUS) 5 Unit(s) SubCutaneous at bedtime  insulin lispro (HumaLOG) corrective regimen sliding scale   SubCutaneous three times a day before meals  insulin lispro (HumaLOG) corrective regimen sliding scale   SubCutaneous at bedtime  methylPREDNISolone sodium succinate Injectable 30 milliGRAM(s) IV Push daily  metoprolol tartrate 50 milliGRAM(s) Oral two times a day  NIFEdipine XL 60 milliGRAM(s) Oral <User Schedule>  oxyCODONE    IR 5 milliGRAM(s) Oral every 4 hours PRN  pantoprazole    Tablet 40 milliGRAM(s) Oral before breakfast  senna 2 Tablet(s) Oral at bedtime  sertraline 200 milliGRAM(s) Oral daily  simvastatin 20 milliGRAM(s) Oral at bedtime      Review of Systems:  Constitutional: [ ] Fever [ ] Chills [ ] Fatigue [ ] Weight change   HEENT: [ ] Blurred vision [ ] Eye Pain [ ] Headache [ ] Runny nose [ ] Sore Throat   Respiratory: [ ] Cough [ ] Wheezing [ ] Shortness of breath  Cardiovascular: [ ] Chest Pain [ ] Palpitations [ ] FLOREZ [ ] PND [ ] Orthopnea  Gastrointestinal: [ ] Abdominal Pain [ ] Diarrhea [ ] Constipation [ ] Hemorrhoids [ ] Nausea [ ] Vomiting  Genitourinary: [ ] Nocturia [ ] Dysuria [ ] Incontinence  Extremities: [ ] Swelling [ ] Joint Pain  Neurologic: [ ] Focal deficit [ ] Paresthesias [ ] Syncope  Lymphatic: [ ] Swelling [ ] Lymphadenopathy   Skin: [ ] Rash [ ] Ecchymoses [ ] Wounds [ ] Lesions  Psychiatry: [ ] Depression [ ] Suicidal/Homicidal Ideation [ ] Anxiety [ ] Sleep Disturbances  [ ] 10 point review of systems is otherwise negative except as mentioned above            [ ]Unable to obtain    Vitals:  T(C): 36.8 (11-19-18 @ 04:46), Max: 36.8 (11-19-18 @ 04:46)  HR: 57 (11-19-18 @ 04:46) (57 - 78)  BP: 153/63 (11-19-18 @ 04:46) (151/63 - 178/66)  BP(mean): --  RR: 18 (11-19-18 @ 04:46) (18 - 18)  SpO2: 98% (11-19-18 @ 04:46) (98% - 99%)  Wt(kg): --  Daily     Daily   I&O's Summary    18 Nov 2018 07:01  -  19 Nov 2018 07:00  --------------------------------------------------------  IN: 360 mL / OUT: 2550 mL / NET: -2190 mL    19 Nov 2018 07:01  -  19 Nov 2018 09:52  --------------------------------------------------------  IN: 240 mL / OUT: 0 mL / NET: 240 mL        Physical Exam:  NAD  PERRL, EOMI  Normal oral mucosa NC/AT  S1, S2, RRR, gr iii/vi holosystolic medium pitch murmur apex, no edema, no elevation in JVP  Clear to auscultation bilaterally  Soft, Non-tender, Non-distended, BS+  No clubbing, No joint deformity   Non-focal  No lymphadenopathy  AAOx3, Mood & affect appropriate  No rashes, No ecchymoses, No cyanosis  Procedural Access Site: No hematoma, Non-tender to palpation, 2+ pulse , No bruit, No Ecchymosis    Labs:                        8.5    3.98  )-----------( 159      ( 19 Nov 2018 09:05 )             26.8     11-19    143  |  102  |  64<H>  ----------------------------<  142<H>  3.5   |  30  |  2.33<H>    Ca    9.3      19 Nov 2018 06:00  Phos  3.6     11-19  Mg     2.5     11-19    TPro  x   /  Alb  3.4  /  TBili  x   /  DBili  x   /  AST  x   /  ALT  x   /  AlkPhos  x   11-19        New results/imaging:

## 2018-11-19 NOTE — PROVIDER CONTACT NOTE (OTHER) - ASSESSMENT
temp 101.2, patient c/o vomiting and right ankle pain
Pt aox3 asleep vitals stable denies chest sob and palpitation
VSS, pt verbalized pain to right lower leg.
alert, vs as recorded

## 2018-11-19 NOTE — DIETITIAN INITIAL EVALUATION ADULT. - ORAL INTAKE PTA
good/Pt reports overall good PO intake and appetite, does note appetite does sometimes vary. Typically consumes 3 meals per day consisting of a variety of foods. Denies any micronutrient supplementation. Pt denies chewing/swallowing difficulty, nausea, vomiting, diarrhea, constipation.

## 2018-11-19 NOTE — PROGRESS NOTE ADULT - SUBJECTIVE AND OBJECTIVE BOX
HPI/Interval Hx    Sitting OOB-Chair, without complaints. Awaiting cardiac cath this afternoon. Clip evaluation in progress    MEDICATIONS  (STANDING):  acetylcysteine  Oral Solution 600 milliGRAM(s) Oral every 12 hours  aspirin enteric coated 81 milliGRAM(s) Oral daily  buDESOnide 160 MICROgram(s)/formoterol 4.5 MICROgram(s) Inhaler 2 Puff(s) Inhalation two times a day  clopidogrel Tablet 75 milliGRAM(s) Oral daily  dextrose 5%. 1000 milliLiter(s) (50 mL/Hr) IV Continuous <Continuous>  dextrose 50% Injectable 12.5 Gram(s) IV Push once  docusate sodium 100 milliGRAM(s) Oral three times a day  doxazosin 2 milliGRAM(s) Oral at bedtime  heparin  Injectable 5000 Unit(s) SubCutaneous every 8 hours  influenza   Vaccine 0.5 milliLiter(s) IntraMuscular once  insulin glargine Injectable (LANTUS) 5 Unit(s) SubCutaneous at bedtime  insulin lispro (HumaLOG) corrective regimen sliding scale   SubCutaneous three times a day before meals  insulin lispro (HumaLOG) corrective regimen sliding scale   SubCutaneous at bedtime  methylPREDNISolone sodium succinate Injectable 30 milliGRAM(s) IV Push daily  metoprolol tartrate 50 milliGRAM(s) Oral two times a day  NIFEdipine XL 60 milliGRAM(s) Oral <User Schedule>  pantoprazole    Tablet 40 milliGRAM(s) Oral before breakfast  senna 2 Tablet(s) Oral at bedtime  sertraline 200 milliGRAM(s) Oral daily  simvastatin 20 milliGRAM(s) Oral at bedtime    MEDICATIONS  (PRN):  acetaminophen   Tablet .. 650 milliGRAM(s) Oral every 6 hours PRN Temp greater or equal to 38C (100.4F)  dextrose 40% Gel 15 Gram(s) Oral once PRN Blood Glucose LESS THAN 70 milliGRAM(s)/deciliter  hydrALAZINE 100 milliGRAM(s) Oral every 8 hours PRN Systolic blood pressure > 170  oxyCODONE    IR 5 milliGRAM(s) Oral every 4 hours PRN Severe Pain (7 - 10)      PAST MEDICAL & SURGICAL HISTORY:  HTN (hypertension)  CHF (congestive heart failure)  T2DM (type 2 diabetes mellitus)  HLD (hyperlipidemia)  No significant past surgical history      Review of Systems  CONSTITUTIONAL: No weakness, fevers or chills, (+) fatigue  EYES/ENT: No visual changes;  No vertigo or throat pain   NECK: No pain or stiffness  RESPIRATORY: No cough, wheezing, hemoptysis; No shortness of breath at rest  CARDIOVASCULAR: No chest pain or palpitations, PND, orthopnea, LE edema, (+) exertional dyspnea  GASTROINTESTINAL: No abdominal or epigastric pain. No nausea, vomiting, or hematemesis; No diarrhea or constipation. No melena or hematochezia.  GENITOURINARY: No dysuria, frequency or hematuria  NEUROLOGICAL: No numbness or weakness  SKIN: No itching, burning, rashes, or lesions   All other review of systems is negative unless indicated above.    Physical Exam  General: A/ox 3, No acute Distress  Neck: Supple, NO JVD  Cardiac: S1 S2, II/VI LUSB murmur  Pulmonary: CTAB, Breathing unlabored, No Rhonchi/Rales/Wheezing, diminished at bases bilaterally  Abdomen: Soft, Non -tender, +BS x 4 quads  Extremities: No Rashes, No edema  Neuro: A/o x 3, No focal deficits  Vital Signs Last 24 Hrs  T(C): 36.4 (19 Nov 2018 11:27), Max: 36.8 (19 Nov 2018 04:46)  T(F): 97.5 (19 Nov 2018 11:27), Max: 98.2 (19 Nov 2018 04:46)  HR: 58 (19 Nov 2018 12:17) (53 - 72)  BP: 141/53 (19 Nov 2018 12:17) (136/64 - 178/66)  BP(mean): --  RR: 18 (19 Nov 2018 11:27) (16 - 18)  SpO2: 100% (19 Nov 2018 12:17) (98% - 100%)                        8.5    3.98  )-----------( 159      ( 19 Nov 2018 09:05 )             26.8     11-19    143  |  102  |  64<H>  ----------------------------<  142<H>  3.5   |  30  |  2.33<H>    Ca    9.3      19 Nov 2018 06:00  Phos  3.6     11-19  Mg     2.5     11-19    TPro  x   /  Alb  3.4  /  TBili  x   /  DBili  x   /  AST  x   /  ALT  x   /  AlkPhos  x   11-19      Telemetry: NSR 50-70    EKG  < from: 12 Lead ECG (11.09.18 @ 21:26) >  Ventricular Rate 104 BPM    Atrial Rate 104 BPM    P-R Interval 174 ms    QRS Duration 104 ms    Q-T Interval 346 ms    QTC Calculation(Bezet) 454 ms    P Axis 69 degrees    R Axis -11 degrees    T Axis 74 degrees    Diagnosis Line SINUS TACHYCARDIA  NONSPECIFIC ST ABNORMALITY  ABNORMAL ECG    Confirmed by ATTENDING, ED ([id number]),  IAM RUSS ([id number]) on 11/10/2018 5:27:27 AM    < end of copied text >    < from: Transesophageal Echocardiogram (11.16.18 @ 09:08) >  Patient name: JANE VILLEDA  YOB: 1935   Age: 83 (M)   MR#: [id number]  Study Date: 11/16/2018  Location: [address] [place], [place] [id number]: Ana Arreaga M.D.  Study quality: Technically fair  Referring Physician: Loren Benson MD  Height: 175 cm  Weight: 89 kg  BSA: 2.1 m2  ------------------------------------------------------------------------  PROCEDURE: Transesophageal and transthoracic  echocardiograms with 2-D, M-Mode and complete spectral and  color flow Doppler were performed.  Informed consent was  first obtained for MARBIN. The patient was sedated - see  anesthesia record.  The procedure was monitored with  automatic blood pressure monitoring, ECG tracings and pulse  oximetry.  The transesophageal probe was placed in the  esophagus posterior to the heart without complications.  INDICATION: Nonrheumatic mitral (valve) insufficiency  (I34.0)  ------------------------------------------------------------------------  Observations:  Mitral Valve: Mitral annular calcification, mildly  thickened leaflets . There is a torn chordae seen  associated with the lateral aspect of the posterior leaflet   Severe eccentric mitral regurgitation.Late systolic  reversal noted in the pulmonary veins.  Aortic Valve/Aorta: Calcified trileafletaortic valve with  decreased opening. Estimated aortic valve area equals 1.8  sqcm (by continuity equation), consistent with mild aortic  stenosis. Moderate-severe aortic regurgitation.  Mild atheroma noted in aortic arch/descending aorta.  Left Atrium: Severe left atrial enlargement.  No left  atrial or left atrial appendage thrombus.  Left Ventricle: Normal left ventricular systolic function.  Normal left ventricular internal dimensions and wall  thickness.  Right Heart: Normal right atrium. Normal right ventricular  size and function. Normal tricuspid valve. Moderate  tricuspid regurgitation. Normal pulmonic valve. Moderate  pulmonic regurgitation.  Pericardium/Pleura: Normal pericardium with no pericardial  effusion.  Hemodynamic: Estimated right atrial pressure is 8 mm Hg.  Estimated right ventricular systolic pressure equals 69 mm  Hg, assuming right atrial pressure equals 8 mm Hg,  consistent with severe pulmonary hypertension. Contrast  injection demonstrates no evidence of a patent foramen  ovale.  ------------------------------------------------------------------------  Conclusions:  1. Mitral annular calcification, mildly thickened leaflets  . There is a torn chordae seen associated with the lateral  aspect of the posteriorleaflet  Severe eccentric mitral  regurgitation.Late systolic reversal noted in the pulmonary  veins.  2. Calcified trileaflet aortic valve with decreased  opening. Estimated aortic valve area equals 1.8 sqcm (by  continuity equation), consistent with mild aortic stenosis.  Moderate-severe aortic regurgitation.  3. Severe left atrial enlargement.  No left atrial or left  atrial appendage thrombus.  4. Normal left ventricular systolic function.  5. Normal right ventricular size and function.  6. Normal tricuspid valve. Moderate tricuspid  regurgitation.  7. Normal pulmonic valve. Moderate pulmonic regurgitation.  Estimated pulmonary artery systolic pressure equals 69  mm  Hg, assuming right atrial pressure equals 8 mm Hg,  consistent with severe pulmonary pressures.  ------------------------------------------------------------------------  Confirmed on  11/16/2018 - 14:46:12 by Jammie Randolph M.D.  ------------------------------------------------------------------------    < end of copied text >      Other

## 2018-11-19 NOTE — PROGRESS NOTE ADULT - ASSESSMENT
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 Walls  Rheumatology Fellow  [phone]

## 2018-11-19 NOTE — PROGRESS NOTE ADULT - ASSESSMENT
A/P: 83M w/ PMHx of CAD (s/p PCI x 6), reported "CHF" (nl LVSF on TTE, possibly valvular HF 2/2 severe MR), CKD, DM2, HTN, HLD admitted on 11/10 with symptoms likely 2/2 valvular HF 2/2 severe MR.     - clinically stable but still sob  - MARBIN on 11/16 confirming primary MR, mod-severe AI, severe pHTN, severe LAE  - c/w aspirin, plavix, lasix, nifedipine, hydral, metop, statin  - c/w medical management and assess volume status, strict i/o's  - will not be a good candidate for LCH with cr 2.33  - follow-up with structural team regarding mitral valve     Rodolfo Brown, #[id number]  Cardiology fellow A/P: 83M w/ PMHx of CAD (s/p PCI x 6), reported "CHF" (nl LVSF on TTE, valvular HF 2/2 severe MR), CKD, DM2, HTN, HLD admitted on 11/10 with symptoms likely 2/2 valvular HF 2/2 severe MR.     - clinically stable but still sob  - MARBIN on 11/16 confirming primary MR, mod-severe AI, severe pHTN, severe LAE  - c/w aspirin, plavix, lasix, nifedipine, hydral, metop, statin  - c/w medical management and assess volume status, strict i/o's  - will not be a good candidate for LCH with cr 2.33  - follow-up with structural team regarding mitral valve     Rodolfo Brown, #[id number]  Cardiology fellow

## 2018-11-20 VITALS — OXYGEN SATURATION: 97 % | RESPIRATION RATE: 18 BRPM

## 2018-11-20 DIAGNOSIS — I34.0 NONRHEUMATIC MITRAL (VALVE) INSUFFICIENCY: ICD-10-CM

## 2018-11-20 LAB
ANION GAP SERPL CALC-SCNC: 11 MMOL/L — SIGNIFICANT CHANGE UP (ref 5–17)
BUN SERPL-MCNC: 62 MG/DL — HIGH (ref 7–23)
CALCIUM SERPL-MCNC: 9.4 MG/DL — SIGNIFICANT CHANGE UP (ref 8.4–10.5)
CHLORIDE SERPL-SCNC: 104 MMOL/L — SIGNIFICANT CHANGE UP (ref 96–108)
CO2 SERPL-SCNC: 28 MMOL/L — SIGNIFICANT CHANGE UP (ref 22–31)
CREAT SERPL-MCNC: 2.32 MG/DL — HIGH (ref 0.5–1.3)
GLUCOSE BLDC GLUCOMTR-MCNC: 193 MG/DL — HIGH (ref 70–99)
GLUCOSE BLDC GLUCOMTR-MCNC: 314 MG/DL — HIGH (ref 70–99)
GLUCOSE SERPL-MCNC: 207 MG/DL — HIGH (ref 70–99)
HCT VFR BLD CALC: 26.7 % — LOW (ref 39–50)
HGB BLD-MCNC: 8.3 G/DL — LOW (ref 13–17)
MAGNESIUM SERPL-MCNC: 2.6 MG/DL — SIGNIFICANT CHANGE UP (ref 1.6–2.6)
MCHC RBC-ENTMCNC: 29.1 PG — SIGNIFICANT CHANGE UP (ref 27–34)
MCHC RBC-ENTMCNC: 31.1 GM/DL — LOW (ref 32–36)
MCV RBC AUTO: 93.7 FL — SIGNIFICANT CHANGE UP (ref 80–100)
PLATELET # BLD AUTO: 168 K/UL — SIGNIFICANT CHANGE UP (ref 150–400)
POTASSIUM SERPL-MCNC: 3.5 MMOL/L — SIGNIFICANT CHANGE UP (ref 3.5–5.3)
POTASSIUM SERPL-SCNC: 3.5 MMOL/L — SIGNIFICANT CHANGE UP (ref 3.5–5.3)
RBC # BLD: 2.85 M/UL — LOW (ref 4.2–5.8)
RBC # FLD: 14.7 % — HIGH (ref 10.3–14.5)
SODIUM SERPL-SCNC: 143 MMOL/L — SIGNIFICANT CHANGE UP (ref 135–145)
WBC # BLD: 4.69 K/UL — SIGNIFICANT CHANGE UP (ref 3.8–10.5)
WBC # FLD AUTO: 4.69 K/UL — SIGNIFICANT CHANGE UP (ref 3.8–10.5)

## 2018-11-20 PROCEDURE — 97110 THERAPEUTIC EXERCISES: CPT

## 2018-11-20 PROCEDURE — 84484 ASSAY OF TROPONIN QUANT: CPT

## 2018-11-20 PROCEDURE — 83540 ASSAY OF IRON: CPT

## 2018-11-20 PROCEDURE — 82962 GLUCOSE BLOOD TEST: CPT

## 2018-11-20 PROCEDURE — 83880 ASSAY OF NATRIURETIC PEPTIDE: CPT

## 2018-11-20 PROCEDURE — 93312 ECHO TRANSESOPHAGEAL: CPT

## 2018-11-20 PROCEDURE — 97162 PT EVAL MOD COMPLEX 30 MIN: CPT

## 2018-11-20 PROCEDURE — 99233 SBSQ HOSP IP/OBS HIGH 50: CPT | Mod: GC

## 2018-11-20 PROCEDURE — 84550 ASSAY OF BLOOD/URIC ACID: CPT

## 2018-11-20 PROCEDURE — 99239 HOSP IP/OBS DSCHRG MGMT >30: CPT

## 2018-11-20 PROCEDURE — 93456 R HRT CORONARY ARTERY ANGIO: CPT

## 2018-11-20 PROCEDURE — 83010 ASSAY OF HAPTOGLOBIN QUANT: CPT

## 2018-11-20 PROCEDURE — 71250 CT THORAX DX C-: CPT

## 2018-11-20 PROCEDURE — 84156 ASSAY OF PROTEIN URINE: CPT

## 2018-11-20 PROCEDURE — 85652 RBC SED RATE AUTOMATED: CPT

## 2018-11-20 PROCEDURE — 83615 LACTATE (LD) (LDH) ENZYME: CPT

## 2018-11-20 PROCEDURE — 84165 PROTEIN E-PHORESIS SERUM: CPT

## 2018-11-20 PROCEDURE — 87186 SC STD MICRODIL/AGAR DIL: CPT

## 2018-11-20 PROCEDURE — 82550 ASSAY OF CK (CPK): CPT

## 2018-11-20 PROCEDURE — 80048 BASIC METABOLIC PNL TOTAL CA: CPT

## 2018-11-20 PROCEDURE — C1769: CPT

## 2018-11-20 PROCEDURE — 76937 US GUIDE VASCULAR ACCESS: CPT

## 2018-11-20 PROCEDURE — 72070 X-RAY EXAM THORAC SPINE 2VWS: CPT

## 2018-11-20 PROCEDURE — 74018 RADEX ABDOMEN 1 VIEW: CPT

## 2018-11-20 PROCEDURE — 73610 X-RAY EXAM OF ANKLE: CPT

## 2018-11-20 PROCEDURE — 82553 CREATINE MB FRACTION: CPT

## 2018-11-20 PROCEDURE — 96374 THER/PROPH/DIAG INJ IV PUSH: CPT | Mod: XU

## 2018-11-20 PROCEDURE — 87040 BLOOD CULTURE FOR BACTERIA: CPT

## 2018-11-20 PROCEDURE — C1894: CPT

## 2018-11-20 PROCEDURE — 85610 PROTHROMBIN TIME: CPT

## 2018-11-20 PROCEDURE — 97116 GAIT TRAINING THERAPY: CPT

## 2018-11-20 PROCEDURE — 83735 ASSAY OF MAGNESIUM: CPT

## 2018-11-20 PROCEDURE — 84540 ASSAY OF URINE/UREA-N: CPT

## 2018-11-20 PROCEDURE — 87086 URINE CULTURE/COLONY COUNT: CPT

## 2018-11-20 PROCEDURE — 86334 IMMUNOFIX E-PHORESIS SERUM: CPT

## 2018-11-20 PROCEDURE — 80061 LIPID PANEL: CPT

## 2018-11-20 PROCEDURE — C1887: CPT

## 2018-11-20 PROCEDURE — 76775 US EXAM ABDO BACK WALL LIM: CPT

## 2018-11-20 PROCEDURE — 80053 COMPREHEN METABOLIC PANEL: CPT

## 2018-11-20 PROCEDURE — 86880 COOMBS TEST DIRECT: CPT

## 2018-11-20 PROCEDURE — 72100 X-RAY EXAM L-S SPINE 2/3 VWS: CPT

## 2018-11-20 PROCEDURE — 83036 HEMOGLOBIN GLYCOSYLATED A1C: CPT

## 2018-11-20 PROCEDURE — 83550 IRON BINDING TEST: CPT

## 2018-11-20 PROCEDURE — 85730 THROMBOPLASTIN TIME PARTIAL: CPT

## 2018-11-20 PROCEDURE — 83690 ASSAY OF LIPASE: CPT

## 2018-11-20 PROCEDURE — 96375 TX/PRO/DX INJ NEW DRUG ADDON: CPT | Mod: XU

## 2018-11-20 PROCEDURE — 85027 COMPLETE CBC AUTOMATED: CPT

## 2018-11-20 PROCEDURE — 82150 ASSAY OF AMYLASE: CPT

## 2018-11-20 PROCEDURE — 82570 ASSAY OF URINE CREATININE: CPT

## 2018-11-20 PROCEDURE — 73522 X-RAY EXAM HIPS BI 3-4 VIEWS: CPT

## 2018-11-20 PROCEDURE — 84100 ASSAY OF PHOSPHORUS: CPT

## 2018-11-20 PROCEDURE — 84155 ASSAY OF PROTEIN SERUM: CPT

## 2018-11-20 PROCEDURE — 84300 ASSAY OF URINE SODIUM: CPT

## 2018-11-20 PROCEDURE — 80069 RENAL FUNCTION PANEL: CPT

## 2018-11-20 PROCEDURE — 86901 BLOOD TYPING SEROLOGIC RH(D): CPT

## 2018-11-20 PROCEDURE — 86140 C-REACTIVE PROTEIN: CPT

## 2018-11-20 PROCEDURE — 94660 CPAP INITIATION&MGMT: CPT

## 2018-11-20 PROCEDURE — 94640 AIRWAY INHALATION TREATMENT: CPT

## 2018-11-20 PROCEDURE — 83605 ASSAY OF LACTIC ACID: CPT

## 2018-11-20 PROCEDURE — 81003 URINALYSIS AUTO W/O SCOPE: CPT

## 2018-11-20 PROCEDURE — 93306 TTE W/DOPPLER COMPLETE: CPT

## 2018-11-20 PROCEDURE — 93970 EXTREMITY STUDY: CPT

## 2018-11-20 PROCEDURE — 84443 ASSAY THYROID STIM HORMONE: CPT

## 2018-11-20 PROCEDURE — 71045 X-RAY EXAM CHEST 1 VIEW: CPT

## 2018-11-20 PROCEDURE — 86900 BLOOD TYPING SEROLOGIC ABO: CPT

## 2018-11-20 PROCEDURE — 51702 INSERT TEMP BLADDER CATH: CPT

## 2018-11-20 PROCEDURE — 84145 PROCALCITONIN (PCT): CPT

## 2018-11-20 PROCEDURE — 86850 RBC ANTIBODY SCREEN: CPT

## 2018-11-20 PROCEDURE — 99291 CRITICAL CARE FIRST HOUR: CPT | Mod: 25

## 2018-11-20 PROCEDURE — 81001 URINALYSIS AUTO W/SCOPE: CPT

## 2018-11-20 RX ORDER — INSULIN GLARGINE 100 [IU]/ML
8 INJECTION, SOLUTION SUBCUTANEOUS
Qty: 0 | Refills: 0 | COMMUNITY

## 2018-11-20 RX ORDER — FUROSEMIDE 40 MG
1 TABLET ORAL
Qty: 0 | Refills: 0 | COMMUNITY

## 2018-11-20 RX ORDER — INSULIN GLARGINE 100 [IU]/ML
5 INJECTION, SOLUTION SUBCUTANEOUS
Qty: 0 | Refills: 0 | COMMUNITY
Start: 2018-11-20

## 2018-11-20 RX ORDER — POTASSIUM CHLORIDE 20 MEQ
1 PACKET (EA) ORAL
Qty: 0 | Refills: 0 | COMMUNITY

## 2018-11-20 RX ADMIN — Medication 60 MILLIGRAM(S): at 06:16

## 2018-11-20 RX ADMIN — Medication 50 MILLIGRAM(S): at 06:16

## 2018-11-20 RX ADMIN — Medication 600 MILLIGRAM(S): at 10:53

## 2018-11-20 RX ADMIN — Medication 30 MILLIGRAM(S): at 06:16

## 2018-11-20 RX ADMIN — HEPARIN SODIUM 5000 UNIT(S): 5000 INJECTION INTRAVENOUS; SUBCUTANEOUS at 06:17

## 2018-11-20 RX ADMIN — SERTRALINE 200 MILLIGRAM(S): 25 TABLET, FILM COATED ORAL at 12:08

## 2018-11-20 RX ADMIN — HEPARIN SODIUM 5000 UNIT(S): 5000 INJECTION INTRAVENOUS; SUBCUTANEOUS at 14:34

## 2018-11-20 RX ADMIN — Medication 100 MILLIGRAM(S): at 06:16

## 2018-11-20 RX ADMIN — Medication 1: at 08:30

## 2018-11-20 RX ADMIN — BUDESONIDE AND FORMOTEROL FUMARATE DIHYDRATE 2 PUFF(S): 160; 4.5 AEROSOL RESPIRATORY (INHALATION) at 06:17

## 2018-11-20 RX ADMIN — Medication 81 MILLIGRAM(S): at 12:07

## 2018-11-20 RX ADMIN — PANTOPRAZOLE SODIUM 40 MILLIGRAM(S): 20 TABLET, DELAYED RELEASE ORAL at 06:17

## 2018-11-20 RX ADMIN — CLOPIDOGREL BISULFATE 75 MILLIGRAM(S): 75 TABLET, FILM COATED ORAL at 12:07

## 2018-11-20 RX ADMIN — Medication 4: at 12:08

## 2018-11-20 NOTE — PROGRESS NOTE ADULT - PROBLEM SELECTOR PROBLEM 5
RAMÍREZ (acute kidney injury)
Essential hypertension
Leg swelling
RAMÍREZ (acute kidney injury)
Coronary artery disease involving native coronary artery of native heart, angina presence unspecified
RAMÍREZ (acute kidney injury)

## 2018-11-20 NOTE — PROGRESS NOTE ADULT - PROBLEM SELECTOR PROBLEM 10
Advanced care planning/counseling discussion

## 2018-11-20 NOTE — PROGRESS NOTE ADULT - PROBLEM SELECTOR PROBLEM 8
Type 2 diabetes mellitus with complication, with long-term current use of insulin
Advanced care planning/counseling discussion
Type 2 diabetes mellitus with complication, with long-term current use of insulin
Benign prostatic hyperplasia without lower urinary tract symptoms
Type 2 diabetes mellitus with complication, with long-term current use of insulin

## 2018-11-20 NOTE — CONSULT NOTE ADULT - CONSULT REQUESTED DATE/TIME
11-Nov-2018 13:26
14-Nov-2018 14:38
14-Nov-2018 18:43
15-Nov-2018 13:26
16-Nov-2018 13:37
20-Nov-2018 15:18
14-Nov-2018 16:28

## 2018-11-20 NOTE — PROGRESS NOTE ADULT - ATTENDING COMMENTS
Agree with plan as outlined above.  Needs to be able to lie flat for [facility]. Diurese and re-assess tomorrow.
Davion Dean  Pager: [phone]. If no response or past 5 pm call [phone].     Will sign off, please call with questions.
Davion Dean  Pager: [phone]. If no response or past 5 pm call [phone].
Patient seen and examined at bedside. Agree with assessment and plan as stated above. Please call [phone] for any questions or concerns
83 year old man with torn chordae, partial flail mitral leaflet, severe mitral regurgitation, consistent murmur. Volume status optimized, congestive heart failure compensated, evaluating for mitral procedure. Coronary angiography completed and no indication for interventions. Anticipate discharge to home and return as arranged by [facility].
Patient interviewed and examined.  Chart reviewed and note edited where appropriate.  Case discussed with fellow.  Agree w/ Assessment and Plan as outlined.    Ryan Zapata MD [facility]  Spectra:  [phone]  Office: [phone]
Patient interviewed and examined. I was physically present for the essential portions of the E/M service provided.  Chart reviewed and note edited where appropriate.  Case discussed with fellow.  Agree w/ Assessment and Plan as outlined.    Ryan Zapata MD [facility]  Spectra:  [phone]  Office: [phone]
Patient seen and examined at bedside. Agree with assessment and plan as stated above. Please call [phone] for any questions or concerns
83 year old man with torn chordae, partial flail mitral leaflet, severe mitral regurgitation, consistent murmur. Volume status optimized, congestive heart failure compensated, evaluating for mitral procedure.
Agree with plan as outlined above.  Is able to lie flat on current lasix regimen
D/c planning home today  D/c time 40 mins

## 2018-11-20 NOTE — CHART NOTE - NSCHARTNOTEFT_GEN_A_CORE
83M, trilingual Lao/Marshallese speaking, c hx CAD s/p 6 stents (last stent 6 years ago), CHF, CKD (unknown baseline), DM2, HTN, HLD, chronic R lower back pain and chronic suprapubic pain, former smoker, presents with 3 days of increasing SOB, leg swelling, and chest pain.      Reviewed labs, cxr.     Patient admitted to tele with fluid over load / Pulmonary edema started on iv diuresis.   Follow up CT chest. Monitor renal function.   RAMÍREZ on CKD- Unknown baseline creatinine.   Continue with Diuresis, lower Lasix to 40 mg iv BID, follow up renal sono given patient on jimenez.   Continue with tele monitoring, hold off Bipap for now, continue with Oxygen NC.
Discussed with Dr. Arreola regarding Lasix dose in the setting of reduced renal function - to continue to hold until seen by renal in 1 week and to resume per renal/PMD    [id number]
MEDICINE PA     MEDICINE PA    Notified by RN patient with temperature . Seen and examined patient at bedside. + episode of emesis. +burning with urination. Patient is alert, NAD. Denies HA, CP, SOB, productive cough,  abd pain.    VITAL SIGNS:  T(C): 37.1 (11-14-18 @ 00:02), Max: 38.4 (11-13-18 @ 20:33)  HR: 67 (11-14-18 @ 00:02) (67 - 86)  BP: 155/57 (11-14-18 @ 00:02) (155/56 - 184/69)  RR: 18 (11-14-18 @ 00:02) (16 - 18)  SpO2: 98% (11-14-18 @ 00:02) (94% - 98%)    PHYSICAL EXAM:  Constitutional: AOx3. NAD. +jaundice   Neuro:  Grossly intact   Cardiovascular: +S1 S2. RRR.  No murmurs.  trace R LE edema   Respiratory:  Even, unlabored.  Clear lungs bilaterally. No wheezing, rhonchi, or crackles.  Gastrointestinal: +BS X4 active. Soft. Nontender. Nondistended   Extremities/Vascular: +2 pulses bilaterally.   Skin:  +warm to touch. slightly jaundice/no scleral icterus     MEDICATIONS:    aspirin enteric coated 81 milliGRAM(s) Oral daily  buDESOnide 160 MICROgram(s)/formoterol 4.5 MICROgram(s) Inhaler 2 Puff(s) Inhalation two times a day  clopidogrel Tablet 75 milliGRAM(s) Oral daily  dextrose 5%. 1000 milliLiter(s) (50 mL/Hr) IV Continuous <Continuous>  dextrose 50% Injectable 12.5 Gram(s) IV Push once  docusate sodium 100 milliGRAM(s) Oral three times a day  doxazosin 2 milliGRAM(s) Oral at bedtime  furosemide    Tablet 40 milliGRAM(s) Oral two times a day  heparin  Injectable 5000 Unit(s) SubCutaneous every 8 hours  influenza   Vaccine 0.5 milliLiter(s) IntraMuscular once  insulin lispro (HumaLOG) corrective regimen sliding scale   SubCutaneous three times a day before meals  insulin lispro (HumaLOG) corrective regimen sliding scale   SubCutaneous at bedtime  metoprolol tartrate 50 milliGRAM(s) Oral two times a day  NIFEdipine XL 90 milliGRAM(s) Oral daily  pantoprazole    Tablet 40 milliGRAM(s) Oral before breakfast  senna 2 Tablet(s) Oral at bedtime  sertraline 200 milliGRAM(s) Oral daily  simvastatin 20 milliGRAM(s) Oral at bedtime      LABORATORY:                  9.5    4.37  )-----------( 113      ( 13 Nov 2018 09:03 )             29.3     MICROBIOLOGY:     RADIOLOGY:  CT Chest: IMPRESSION: Small bilateral pleural effusions with pulmonary edema.    ASSESSMENT/PLAN:   HPI: 83M, trilingual Chadian/Armenian speaking, c hx CAD s/p 6 stents (last stent 6 years ago), CHF, CKD (unknown baseline), DM2, HTN, HLD, chronic R lower back pain and chronic suprapubic pain, former smoker, admitted with 3 days of increasing SOB, leg swelling, and chest pain. Being treated for CHF exacerbation. Acutely presenting with fever.     1)  Fever  - Tylenol / Cooling measures prn for Pyrexia  - BC x2, UA/UC ordered  - Abdominal XR ordered. RLE XR pending   - Would start broad spectrum abx if recurrent fever.     2)  Jaundice   - CMP, LFTs ordered   - Further imaging per clinical course   - Will endorse to primary team in AM. Attending to follow.     Adri Frost PA-C   Department of Medicine  Spectra [phone]

## 2018-11-20 NOTE — PROGRESS NOTE ADULT - PROBLEM SELECTOR PROBLEM 1
Acute respiratory failure with hypoxia
Acute systolic congestive heart failure
Fever, unspecified fever cause
Gout attack
Severe mitral valve regurgitation
Acute systolic congestive heart failure
Fever, unspecified fever cause
Fever, unspecified fever cause

## 2018-11-20 NOTE — PROGRESS NOTE ADULT - PROBLEM SELECTOR PROBLEM 6
Coronary artery disease involving native coronary artery of native heart, angina presence unspecified
Type 2 diabetes mellitus with complication, with long-term current use of insulin
Coronary artery disease involving native coronary artery of native heart, angina presence unspecified
Essential hypertension

## 2018-11-20 NOTE — PROGRESS NOTE ADULT - REASON FOR ADMISSION
respiratory distress, SOB, chest pain

## 2018-11-20 NOTE — PROGRESS NOTE ADULT - ASSESSMENT
83M, trilingual St [place]ian/Swazi speaking, c hx CAD s/p 6 stents (last stent 6 years ago), CHF, CKD (unknown baseline), DM2, HTN, HLD, chronic R lower back pain and chronic suprapubic pain, former smoker, pw hypoxic resp failure 2/2 pulm edema likely 2/2 CHF exacerbation with severe MR, severe AR, mild AS.

## 2018-11-20 NOTE — PROGRESS NOTE ADULT - SUBJECTIVE AND OBJECTIVE BOX
Patient seen in follow up for CKD. Cardiac cath today, conversant, not dyspneic at rest.    Vital Signs Last 24 Hrs  T(C): 36.4 (11-19-18 @ 11:27), Max: 36.8 (11-19-18 @ 04:46)  T(F): 97.5 (11-19-18 @ 11:27), Max: 98.2 (11-19-18 @ 04:46)  HR: 58 (11-19-18 @ 12:17) (53 - 72)  BP: 141/53 (11-19-18 @ 12:17) (136/64 - 178/66)  RR: 18 (11-19-18 @ 11:27) (16 - 18)  SpO2: 100% (11-19-18 @ 12:17) (98% - 100%)    PHYSICAL EXAM:  General: NAD  Respiratory: b/l air entry, crackles at bases  Cardiovascular: S1 S2 reg, SM present  Gastrointestinal: soft, no bladder distension  Extremities: no edema                        8.5    3.98  )-----------( 159      ( 19 Nov 2018 09:05 )             26.8     19 Nov 2018 06:00    143    |  102    |  64     ----------------------------<  142    3.5     |  30     |  2.33     Ca    9.3        19 Nov 2018 06:00  Phos  3.6       19 Nov 2018 06:00  Mg     2.5       19 Nov 2018 06:00    TPro  x      /  Alb  3.4    /  TBili  x      /  DBili  x      /  AST  x      /  ALT  x      /  AlkPhos  x      19 Nov 2018 06:00    LIVER FUNCTIONS - ( 19 Nov 2018 06:00 )  Alb: 3.4 g/dL / Pro: x     / ALK PHOS: x     / ALT: x     / AST: x     / GGT: x           acetaminophen   Tablet .. 650 milliGRAM(s) Oral every 6 hours PRN  acetylcysteine  Oral Solution 600 milliGRAM(s) Oral every 12 hours  aspirin enteric coated 81 milliGRAM(s) Oral daily  buDESOnide 160 MICROgram(s)/formoterol 4.5 MICROgram(s) Inhaler 2 Puff(s) Inhalation two times a day  clopidogrel Tablet 75 milliGRAM(s) Oral daily  dextrose 40% Gel 15 Gram(s) Oral once PRN  dextrose 5%. 1000 milliLiter(s) IV Continuous <Continuous>  dextrose 50% Injectable 12.5 Gram(s) IV Push once  docusate sodium 100 milliGRAM(s) Oral three times a day  doxazosin 2 milliGRAM(s) Oral at bedtime  heparin  Injectable 5000 Unit(s) SubCutaneous every 8 hours  hydrALAZINE 100 milliGRAM(s) Oral every 8 hours PRN  influenza   Vaccine 0.5 milliLiter(s) IntraMuscular once  insulin glargine Injectable (LANTUS) 5 Unit(s) SubCutaneous at bedtime  insulin lispro (HumaLOG) corrective regimen sliding scale   SubCutaneous three times a day before meals  insulin lispro (HumaLOG) corrective regimen sliding scale   SubCutaneous at bedtime  metoprolol tartrate 50 milliGRAM(s) Oral two times a day  NIFEdipine XL 60 milliGRAM(s) Oral <User Schedule>  oxyCODONE    IR 5 milliGRAM(s) Oral every 4 hours PRN  pantoprazole    Tablet 40 milliGRAM(s) Oral before breakfast  senna 2 Tablet(s) Oral at bedtime  sertraline 200 milliGRAM(s) Oral daily  simvastatin 20 milliGRAM(s) Oral at bedtime    A/P:  CM, CHF, severe MR  CKD, baseline Cr ~ 2.1 per daughter  Cath today  Cr stable  Hold Lasix  Avoid ACE, ARB  BMP in am  D/w daughter at bedside Patient seen in follow up for CKD. Conversant, not dyspneic at rest.    Vital Signs Last 24 Hrs  T(C): 36.6 (11-20-18 @ 11:13), Max: 36.6 (11-19-18 @ 21:15)  T(F): 97.8 (11-20-18 @ 11:13), Max: 97.9 (11-19-18 @ 21:15)  HR: 69 (11-20-18 @ 11:13) (63 - 72)  BP: 156/70 (11-20-18 @ 11:13) (107/52 - 175/71)  RR: 18 (11-20-18 @ 11:13) (18 - 18)  SpO2: 97% (11-20-18 @ 11:13) (96% - 100%)    PHYSICAL EXAM:  General: NAD  Respiratory: b/l air entry, decr BS at bases  Cardiovascular: S1 S2, SM present  Gastrointestinal: soft, NT, ND  Extremities: no edema                                8.3    4.69  )-----------( 168      ( 20 Nov 2018 09:11 )             26.7     20 Nov 2018 06:21    143    |  104    |  62     ----------------------------<  207    3.5     |  28     |  2.32     Ca    9.4        20 Nov 2018 06:21  Phos  3.6       19 Nov 2018 06:00  Mg     2.6       20 Nov 2018 06:21    TPro  x      /  Alb  3.4    /  TBili  x      /  DBili  x      /  AST  x      /  ALT  x      /  AlkPhos  x      19 Nov 2018 06:00    LIVER FUNCTIONS - ( 19 Nov 2018 06:00 )  Alb: 3.4 g/dL / Pro: x     / ALK PHOS: x     / ALT: x     / AST: x     / GGT: x              acetaminophen   Tablet .. 650 milliGRAM(s) Oral every 6 hours PRN  aspirin enteric coated 81 milliGRAM(s) Oral daily  buDESOnide 160 MICROgram(s)/formoterol 4.5 MICROgram(s) Inhaler 2 Puff(s) Inhalation two times a day  clopidogrel Tablet 75 milliGRAM(s) Oral daily  dextrose 40% Gel 15 Gram(s) Oral once PRN  dextrose 5%. 1000 milliLiter(s) IV Continuous <Continuous>  dextrose 50% Injectable 12.5 Gram(s) IV Push once  docusate sodium 100 milliGRAM(s) Oral three times a day  doxazosin 2 milliGRAM(s) Oral at bedtime  heparin  Injectable 5000 Unit(s) SubCutaneous every 8 hours  hydrALAZINE 100 milliGRAM(s) Oral every 8 hours PRN  influenza   Vaccine 0.5 milliLiter(s) IntraMuscular once  insulin glargine Injectable (LANTUS) 5 Unit(s) SubCutaneous at bedtime  insulin lispro (HumaLOG) corrective regimen sliding scale   SubCutaneous three times a day before meals  insulin lispro (HumaLOG) corrective regimen sliding scale   SubCutaneous at bedtime  metoprolol tartrate 50 milliGRAM(s) Oral two times a day  NIFEdipine XL 60 milliGRAM(s) Oral <User Schedule>  oxyCODONE    IR 5 milliGRAM(s) Oral every 4 hours PRN  pantoprazole    Tablet 40 milliGRAM(s) Oral before breakfast  predniSONE   Tablet 30 milliGRAM(s) Oral daily  senna 2 Tablet(s) Oral at bedtime  sertraline 200 milliGRAM(s) Oral daily  simvastatin 20 milliGRAM(s) Oral at bedtime    A/P:    CM, CHF, severe MR  CKD, baseline Cr ~ 2.1 per daughter  S/p Cath 11/19  Cr stable today  Hold Lasix  Avoid ACE, ARB  No nephrotoxins  BMP in am  No objection to d/c from renal pov as long as Cr stable in am

## 2018-11-20 NOTE — PROGRESS NOTE ADULT - PROBLEM SELECTOR PLAN 5
stable. monitor  monitor outpt.   - renal u/s with only simple cyst without obstruction.
- check LE duplex
- cont pt's home BP meds at reduced doses  - cont metoprolol 50 BID + nifedpine 90 xl + hydralazine 100 PRN
RAMÍREZ on CKD, per family baseline around 2s  - renal u/s with only simple cyst without obstruction  - follow up with renal outpt.
RAMÍREZ on CKD, per family baseline around 2s  - renal u/s with only simple cyst without obstruction  - renal following, monitor bmp daioly
RAMÍREZ vs RAMÍREZ on CKD, unknown baseline but stable  - renal u/s with only simple cyst without obstruction  - renal following, monitor bmp daioly
RAMÍREZ vs RAMÍREZ on CKD, unknown baseline but stable  - renal u/s with only simple cyst without obstruction  - renal following, monitor bmp daioly
stable. monitor outpt.   - renal u/s with only simple cyst without obstruction.
- cont asa, plavix as above + zocor  - ischemic eval once clinically improved
stable. monitor outpt.   - renal u/s with only simple cyst without obstruction.

## 2018-11-20 NOTE — CONSULT NOTE ADULT - SUBJECTIVE AND OBJECTIVE BOX
HPI:  83M trilingual Swedish/Portuguese speaking, c hx CAD s/p 6 stents (last stent 6 years ago), CHF, CKD (unknown baseline), DM2, HTN, HLD, chronic R lower back pain and chronic suprapubic pain, former smoker, presents with 3 days of increasing SOB, leg swelling, and chest pain.    History obtained from patient. At baseline, pt ambulates with a walker, and has significant difficulty ambulating up a flight of stairs. He has been taking all of his medications except the lasix for the past 4 days, because he was urinating too often. [place] [facility] [place] [facility] in [place], but since his wife passed away 1.5 months ago, has been living with his daughter Mary Verdin in [place]. Pt was last hospitalized about 6-7 months ago with the same SOB. Over the past 3 days, reports increasing SOB, respiratory distress, leg swelling, some right chest pain that radiates to b/l arms, and some right flank pain that started yesterday. Denies fevers, chills, URI symptoms. Not on O2 at home. He has had 4 falls in 2018, but not recently. BM yesterday.    VS: Tm 98.8, P 105, /71, R 32, 85% RA when I saw the patient. 100% on bipap  In the ED, received azithro, ceftri, lasix 80IV, tylenol, NTG    ISTOP Reference #: [id number]  Rx Written	Rx Dispensed	Drug	Quantity	Days Supply	Prescriber Name  10/08/2018	10/09/2018	oxycodone-acetaminophen 5-325 mg tab	60	30	Anthony Arellano MD  08/28/2018	09/10/2018	oxycodone-acetaminophen 5-325 mg tab	60	30	Anthony Arellano MD  08/29/2018	08/30/2018	lorazepam 1 mg tablet	60	30	Allan Monreal (10 Nov 2018 04:47)    Patient was diuresed with IV diuresis and now on PO diuretics. Reports that he is intermittently SOB still however patient's family reports that he gets SOB when he is anxious. No CP. No LE edema.      PMH:   HTN (hypertension)  CHF (congestive heart failure)  T2DM (type 2 diabetes mellitus)  HLD (hyperlipidemia)      PSH:   No significant past surgical history      Medications:   aspirin enteric coated 81 milliGRAM(s) Oral daily  buDESOnide 160 MICROgram(s)/formoterol 4.5 MICROgram(s) Inhaler 2 Puff(s) Inhalation two times a day  clopidogrel Tablet 75 milliGRAM(s) Oral daily  dextrose 40% Gel 15 Gram(s) Oral once PRN  dextrose 5%. 1000 milliLiter(s) IV Continuous <Continuous>  dextrose 50% Injectable 12.5 Gram(s) IV Push once  docusate sodium 100 milliGRAM(s) Oral three times a day  doxazosin 2 milliGRAM(s) Oral at bedtime  furosemide    Tablet 40 milliGRAM(s) Oral two times a day  heparin  Injectable 5000 Unit(s) SubCutaneous every 8 hours  hydrALAZINE 100 milliGRAM(s) Oral every 8 hours PRN  influenza   Vaccine 0.5 milliLiter(s) IntraMuscular once  insulin lispro (HumaLOG) corrective regimen sliding scale   SubCutaneous three times a day before meals  insulin lispro (HumaLOG) corrective regimen sliding scale   SubCutaneous at bedtime  metoprolol tartrate 50 milliGRAM(s) Oral two times a day  NIFEdipine XL 90 milliGRAM(s) Oral daily  oxyCODONE    IR 5 milliGRAM(s) Oral every 4 hours PRN  pantoprazole    Tablet 40 milliGRAM(s) Oral before breakfast  senna 2 Tablet(s) Oral at bedtime  sertraline 200 milliGRAM(s) Oral daily  simvastatin 20 milliGRAM(s) Oral at bedtime      Allergies:  No Known Allergies      FAMILY HISTORY:  No pertinent family history in first degree relatives: No hx of CAD in his children      Social History:  Smoking History:  Alcohol Use:  Drug Use:    Review of Systems:  REVIEW OF SYSTEMS:  CONSTITUTIONAL: No weakness, fevers or chills  EYES/ENT: No visual changes;  No dysphagia  NECK: No pain or stiffness  RESPIRATORY: No cough, wheezing, hemoptysis; +shortness of breath  CARDIOVASCULAR: No chest pain or palpitations; No lower extremity edema  GASTROINTESTINAL: No abdominal or epigastric pain. No nausea, vomiting, or hematemesis; No diarrhea or constipation. No melena or hematochezia.  BACK: No back pain  GENITOURINARY: No dysuria, frequency or hematuria  NEUROLOGICAL: No numbness or weakness  SKIN: No itching, burning, rashes, or lesions   All other review of systems is negative unless indicated above.    Physical Exam:  T(F): 99.2 (11-14), Max: 101.2 (11-13)  HR: 59 (11-14) (59 - 86)  BP: 134/57 (11-14) (134/57 - 184/69)  RR: 20 (11-14)  SpO2: 91% (11-14)  GENERAL: No acute distress, well-developed  HEAD:  Atraumatic, Normocephalic  ENT: EOMI, PERRLA, conjunctiva and sclera clear, Neck supple, No JVD, moist mucosa  CHEST/LUNG: Clear to auscultation bilaterally  HEART: Regular rate and rhythm; holosystolic murmur at apex that envelopes S2  ABDOMEN: Soft, Nontende  EXTREMITIES:  No edema  NEUROLOGY: non-focal, cranial nerves intact    Cardiovascular Diagnostic Testing:  TTE:  Conclusions:  1. Severe anteroseptal mitral regurgitation.  2. Calcified trileaflet aortic valve with decreased  opening. Peak transaortic valve gradient equals 27 mm Hg,  mean transaortic valve gradient equals 13 mm Hg, estimated  aortic valve area equals 1.7 sqcm (by continuity equation),  aortic valve velocity time integral equals 52 cm,  consistent with mild aortic stenosis.  3. Normal left ventricular internal dimensions and wall  thickness.  4. Hyperdynamic left ventricular systolic function.  5. Estimated pulmonary artery systolic pressure equals 66  mm Hg, assuming right atrial pressure equals 8 mm Hg,  consistent with severe pulmonary pressures.  *** No previous Echo exam.      Labs: Personally reviewed                        10.1   4.5   )-----------( 110      ( 14 Nov 2018 11:24 )             30.9     11-14    142  |  100  |  52<H>  ----------------------------<  134<H>  3.4<L>   |  28  |  2.45<H>    Ca    9.6      14 Nov 2018 06:01  Phos  4.4     11-13  Mg     2.3     11-13    TPro  7.1  /  Alb  3.8  /  TBili  0.5  /  DBili  x   /  AST  9<L>  /  ALT  6<L>  /  AlkPhos  50  11-14          Serum Pro-Brain Natriuretic Peptide: 4122 pg/mL (11-09 @ 22:34)      Hemoglobin A1C, Whole Blood: 4.7 % (11-10 @ 08:04)    Thyroid Stimulating Hormone, Serum: 1.22 uIU/mL (11-10 @ 08:06)
83M, trilingual Latvian/Emirati speaking, c hx CAD s/p 6 stents (last stent 6 years ago), CHF, CKD (unknown baseline), DM2, HTN, HLD, chronic R lower back pain and chronic suprapubic pain, former smoker, presents with 3 days of increasing SOB, leg swelling, and chest pain.    History obtained from patient. At baseline, pt ambulates with a walker, and has significant difficulty ambulating up a flight of stairs. He has been taking all of his medications except the lasix for the past 4 days, because he was urinating too often. Pt normally lives in [facility] and goes to [facility] in [place], but since his wife passed away 1.5 months ago, has been living with his daughter Mary Verdin in [place]. Pt was last hospitalized about 6-7 months ago with the same SOB. Over the past 3 days, reports increasing SOB, respiratory distress, leg swelling, some right chest pain that radiates to b/l arms, and some right flank pain that started yesterday. Denies fevers, chills, URI symptoms. Not on O2 at home. He has had 4 falls in 2018, but not recently.     Called to evaluate pt for Mitraclip for severe MRMargaret    STS: MVR 7.79%         MVrepair 5.41%	          PAST MEDICAL & SURGICAL HISTORY:  HTN (hypertension)  CHF (congestive heart failure)  T2DM (type 2 diabetes mellitus)  HLD (hyperlipidemia)  No significant past surgical history      No Known Allergies    aspirin enteric coated 81 milliGRAM(s) Oral daily  buDESOnide 160 MICROgram(s)/formoterol 4.5 MICROgram(s) Inhaler 2 Puff(s) Inhalation two times a day  clopidogrel Tablet 75 milliGRAM(s) Oral daily  dextrose 5%. 1000 milliLiter(s) IV Continuous <Continuous>  dextrose 50% Injectable 12.5 Gram(s) IV Push once  docusate sodium 100 milliGRAM(s) Oral three times a day  doxazosin 2 milliGRAM(s) Oral at bedtime  heparin  Injectable 5000 Unit(s) SubCutaneous every 8 hours  influenza   Vaccine 0.5 milliLiter(s) IntraMuscular once  insulin glargine Injectable (LANTUS) 5 Unit(s) SubCutaneous at bedtime  insulin lispro (HumaLOG) corrective regimen sliding scale   SubCutaneous three times a day before meals  insulin lispro (HumaLOG) corrective regimen sliding scale   SubCutaneous at bedtime  metoprolol tartrate 50 milliGRAM(s) Oral two times a day  NIFEdipine XL 60 milliGRAM(s) Oral <User Schedule>  pantoprazole    Tablet 40 milliGRAM(s) Oral before breakfast  predniSONE   Tablet 30 milliGRAM(s) Oral daily  senna 2 Tablet(s) Oral at bedtime  sertraline 200 milliGRAM(s) Oral daily  simvastatin 20 milliGRAM(s) Oral at bedtime      T(C): 36.6 (11-20-18 @ 11:13), Max: 36.6 (11-19-18 @ 21:15)  HR: 69 (11-20-18 @ 11:13) (63 - 72)  BP: 156/70 (11-20-18 @ 11:13) (107/52 - 175/71)  RR: 18 (11-20-18 @ 13:22) (18 - 18)  SpO2: 97% (11-20-18 @ 13:22) (96% - 100%)  Wt(kg): --    11-19 @ 07:01  -  11-20 @ 07:00  --------------------------------------------------------  IN: 840 mL / OUT: 1875 mL / NET: -1035 mL    11-20 @ 07:01  -  11-20 @ 15:18  --------------------------------------------------------  IN: 720 mL / OUT: 850 mL / NET: -130 mL      REVIEW OF SYSTEMS:    CONSTITUTIONAL: No weakness, fevers or chills  EYES/ENT: No visual changes;  No vertigo or throat pain   NECK: No pain or stiffness  RESPIRATORY: No cough, wheezing, hemoptysis; + SOB off O2  CARDIOVASCULAR: No chest pain or palpitations  GASTROINTESTINAL: No abdominal or epigastric pain. No nausea, vomiting, or hematemesis; No diarrhea or constipation. No melena or hematochezia.  GENITOURINARY: No dysuria, frequency or hematuria  NEUROLOGICAL: No numbness or weakness  SKIN: No itching, rashes      Physical Exam:  Gen: NAD, sitting comfortably in chair with O2 NC in place  Pulmonary: cta b/l, good inspiratory effort, good air entry  Cardiac: s1s2, RRR, IV/VI systolic murmur  Gastrointestinal: soft, nontender, +BS  Extremities: warm, venous stasis color changes, +edema  Vascular: no JVD, good distal pulses b/l  Neurological: A&Ox3, nonfocal      Laboratory:                        8.3    4.69  )-----------( 168      ( 20 Nov 2018 09:11 )             26.7    11-20    143  |  104  |  62<H>  ----------------------------<  207<H>  3.5   |  28  |  2.32<H>    Ca    9.4      20 Nov 2018 06:21  Phos  3.6     11-19  Mg     2.6     11-20    TPro  x   /  Alb  3.4  /  TBili  x   /  DBili  x   /  AST  x   /  ALT  x   /  AlkPhos  x   11-19         < from: Transesophageal Echocardiogram (11.16.18 @ 09:08) >  Mitral Valve: Mitral annular calcification, mildly  thickened leaflets . There is a torn chordae seen  associated with the lateral aspect of the posterior leaflet   Severe eccentric mitral regurgitation.Late systolic  reversal noted in the pulmonary veins.  Aortic Valve/Aorta: Calcified trileafletaortic valve with  decreased opening. Estimated aortic valve area equals 1.8  sqcm (by continuity equation), consistent with mild aortic  stenosis. Moderate-severe aortic regurgitation.  Mild atheroma noted in aortic arch/descending aorta.  Left Atrium: Severe left atrial enlargement.  No left  atrial or left atrial appendage thrombus.  Left Ventricle: Normal left ventricular systolic function.  Normal left ventricular internal dimensions and wall  thickness.  Right Heart: Normal right atrium. Normal right ventricular  size and function. Normal tricuspid valve. Moderate  tricuspid regurgitation. Normal pulmonic valve. Moderate  pulmonic regurgitation.  Pericardium/Pleura: Normal pericardium with no pericardial  effusion.  Hemodynamic: Estimated right atrial pressure is 8 mm Hg.  Estimated right ventricular systolic pressure equals 69 mm  Hg, assuming right atrial pressure equals 8 mm Hg,  consistent with severe pulmonary hypertension. Contrast  injection demonstrates no evidence of a patent foramen  ovale.  ------------------------------------------------------------------------  Conclusions:  1. Mitral annular calcification, mildly thickened leaflets  . There is a torn chordae seen associated with the lateral  aspect of the posteriorleaflet  Severe eccentric mitral  regurgitation.Late systolic reversal noted in the pulmonary  veins.  2. Calcified trileaflet aortic valve with decreased  opening. Estimated aortic valve area equals 1.8 sqcm (by  continuity equation), consistent with mild aortic stenosis.  Moderate-severe aortic regurgitation.  3. Severe left atrial enlargement.  No left atrial or left  atrial appendage thrombus.  4. Normal left ventricular systolic function.  5. Normal right ventricular size and function.  6. Normal tricuspid valve. Moderate tricuspid  regurgitation.  7. Normal pulmonic valve. Moderate pulmonic regurgitation.  Estimated pulmonary artery systolic pressure equals 69  mm  Hg, assuming right atrial pressure equals 8 mm Hg,  consistent with severe pulmonary pressures.  ------------------------------------------------------------------------  Confirmed on  11/16/2018 - 14:46:12 by Jammie Randolph M.D.    < end of copied text >          < from: Cardiac Cath Lab - Adult (11.19.18 @ 19:45) >  CORONARY VESSELS: The coronary circulation is rightdominant.  LM:   --  LM: Angiography showed mild atherosclerosis with no flow limiting  lesions.  LAD:   --  Proximal LAD: There was a 10 % stenosis at the site of a prior  stent.  --  Mid LAD: There was a 40 % stenosis.  CX:   --  Circumflex: Angiography showed mild atherosclerosis with no flow  limiting lesions.  --  OM2: There was a 10 % stenosis at the site of a prior stent.  RCA:   --  Ostial RCA: There was a 20 % stenosis at the site of a prior  stent.  --  Mid RCA: Angiography showed moderate atherosclerosis.  COMPLICATIONS: There were no complications.  DIAGNOSTIC RECOMMENDATIONS: Non obstructive coronary artery disease.  Continue aggressive medical management per primary team.  INTERVENTIONAL RECOMMENDATIONS: Non obstructive coronaryartery disease.  Continue aggressive medical management per primary team.  Prepared and signed by  Chang Smart M.D.
83y Male    HPI:  The patient is an 84 y/o male, with PMHx of CHF, HLD, HTN, DM Type 2, Anxiety, CKD and CAD (PCI 6 years ago). He presented to the hospital for increasing CP, SOB and pedal edema. The patient states that he has been having worsening SOB and CP, sometimes even at rest. I spoke with his daughter Mary over the phone ([phone]). She confirms the same, however she states that his wife  7 weeks ago, and has also been having increasing anxiety. He does have daily pedal edema, as he has not been taking his Lasix as prescribed. He performs minimal physical activity due to his symptoms, limited to walking across a room, and this with the assistance of a walker. He utilizes 2 pillows for sleeping. Since his wife's death, he has moved in with his daughter, and she states that he is able to perform his own ADL's. He has known about his Mitral Valve leaking for a couple of years, but it was a mild leak when it was first diagnosed. His last Cardiac Catheterization was 6 years ago, where he had stents placed.          PAST MEDICAL & SURGICAL HISTORY:  HTN (hypertension)  CHF (congestive heart failure)  T2DM (type 2 diabetes mellitus)  HLD (hyperlipidemia)  No significant past surgical history      No Known Allergies    aspirin enteric coated 81 milliGRAM(s) Oral daily  buDESOnide 160 MICROgram(s)/formoterol 4.5 MICROgram(s) Inhaler 2 Puff(s) Inhalation two times a day  clopidogrel Tablet 75 milliGRAM(s) Oral daily  dextrose 5%. 1000 milliLiter(s) IV Continuous <Continuous>  dextrose 50% Injectable 12.5 Gram(s) IV Push once  docusate sodium 100 milliGRAM(s) Oral three times a day  doxazosin 2 milliGRAM(s) Oral at bedtime  furosemide    Tablet 40 milliGRAM(s) Oral two times a day  heparin  Injectable 5000 Unit(s) SubCutaneous every 8 hours  influenza   Vaccine 0.5 milliLiter(s) IntraMuscular once  insulin glargine Injectable (LANTUS) 5 Unit(s) SubCutaneous at bedtime  insulin lispro (HumaLOG) corrective regimen sliding scale   SubCutaneous three times a day before meals  insulin lispro (HumaLOG) corrective regimen sliding scale   SubCutaneous at bedtime  methylPREDNISolone sodium succinate Injectable 40 milliGRAM(s) IV Push daily  metoprolol tartrate 50 milliGRAM(s) Oral two times a day  NIFEdipine XL 90 milliGRAM(s) Oral daily  pantoprazole    Tablet 40 milliGRAM(s) Oral before breakfast  senna 2 Tablet(s) Oral at bedtime  sertraline 200 milliGRAM(s) Oral daily  simvastatin 20 milliGRAM(s) Oral at bedtime      T(C): 36.5 (18 @ 11:37), Max: 36.8 (11-15-18 @ 23:59)  HR: 61 (18 @ 11:37) (58 - 77)  BP: 121/56 (18 @ 11:37) (121/56 - 164/81)  RR: 19 (18 @ 11:37) (18 - 19)  SpO2: 97% (18 @ 11:37) (97% - 99%)  Wt(kg): --    11-15 @ 07:01  -   @ 07:00  --------------------------------------------------------  IN: 410 mL / OUT: 1775 mL / NET: -1365 mL     @ 07:01  -   @ 13:37  --------------------------------------------------------  IN: 0 mL / OUT: 500 mL / NET: -500 mL        Review of Symptoms:  General: Mild fatigue, weakness  Respiratory: + SOB  Cardiac: + CP  Gastrointestinal: Unremarkable  Extremities: + Pedal edema, + Lower extremity weakness  Vascular: Denies  Neurological: Denies  Endocrine: H/O Diabetes      Physical Exam:  Gen: A/O x3  Pulmonary: Mild crackles at B/L Bases  Cardiac: S1S2, RRR, + IV/VI JELLY  ECG: SR  Gastrointestinal: Soft, NT/ND, + Bowel Sounds  Extremities: Trace edema, + Venous Stasis color changes  Vascular: 1+ Pulses B/L, No Bruits, No JVD  Neurological: Non Focal      Laboratory:                        8.7    4.19  )-----------( 129      ( 2018 07:42 )             26.4    11-    143  |  100  |  64<H>  ----------------------------<  148<H>  3.4<L>   |  26  |  2.43<H>    Ca    9.5      2018 06:12    Pro-BNP: Serum Pro-Brain Natriuretic Peptide (18 @ 22:34)    Serum Pro-Brain Natriuretic Peptide: 4122 pg/mL      Transthoracic Echo:    < from: Transthoracic Echocardiogram (18 @ 14:30) >  EF (Visual Estimate): 75 %  Doppler Peak Velocity (m/sec): AoV=2.6  ------------------------------------------------------------------------  Observations:  Mitral Valve: Tethered mitral valve leaflets with normal  opening. Severe anteroseptal mitral regurgitation.  Aortic Valve/Aorta: Calcified trileaflet aortic valve with  decreased opening. Peak transaortic valve gradient equals  27 mm Hg, mean transaortic valve gradient equals 13 mm Hg,  estimated aortic valve area equals 1.7 sqcm (by continuity  equation), aortic valve velocity time integral equals 52  cm, consistent with mild aortic stenosis. Mild aortic  regurgitation.  Peak left ventricular outflow tract  gradient equals 4 mm Hg, mean gradient is equal to 2 mm Hg,  LVOT velocity time integral equals 23 cm.  Aortic Root: 3.6 cm.  LVOT diameter: 2.2 cm.  Left Atrium: Severely dilated left atrium.  LA volume index  = 50 cc/m2.  Left Ventricle: Hyperdynamic left ventricular systolic  function. Normal left ventricular internal dimensions and  wall thickness. Normal diastolic function  Right Heart: Normal right atrium. Normal right ventricular  size and function. Normal tricuspid valve. Mild tricuspid  regurgitation. Normal pulmonic valve. Mild-moderate  pulmonic regurgitation.  Pericardium/Pleura: Normal pericardium with no pericardial  effusion.  Hemodynamic: Estimated right atrial pressure is 8 mm Hg.  Estimated right ventricular systolic pressure equals 66 mm  Hg, assuming right atrial pressure equals 8 mm Hg,  consistent with severe pulmonary hypertension.    < end of copied text >    TransEsophageal Echo:       Awaiting final results. Reviewed with Dr. 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
[facility]  [id number]    HISTORY OF PRESENT ILLNESS:    82 yo M with hx of HTN, CHF, admitted for CHF exacerbation, now with acute R ankle pain.  Pt states he has chronic right sided low back pain with radiation down his legs.  Reports at baseline he has difficulty with ambulation due to low back and ankle pain  but the current presentation of ankle pain is worse than baseline.  Denies any previous history of joint swelling or arthrocentesis  Andreas any rash or any other complaints on ROS.    PAST MEDICAL & SURGICAL HISTORY:  HTN (hypertension)  CHF (congestive heart failure)  T2DM (type 2 diabetes mellitus)  HLD (hyperlipidemia)  No significant past surgical history      Review of Systems:  Gen:  No fevers/chills, weight loss  HEENT: No blurry vision, no difficulty swallowing  CVS: No chest pain/palpitations  Resp: No SOB/wheezing  GI: No N/V/C/D/abdominal pain  MSK: right ankle pain, unable to walk due to pain  Skin: No new rashes  Neuro: No headaches    MEDICATIONS  (STANDING):  aspirin enteric coated 81 milliGRAM(s) Oral daily  buDESOnide 160 MICROgram(s)/formoterol 4.5 MICROgram(s) Inhaler 2 Puff(s) Inhalation two times a day  clopidogrel Tablet 75 milliGRAM(s) Oral daily  dextrose 5%. 1000 milliLiter(s) (50 mL/Hr) IV Continuous <Continuous>  dextrose 50% Injectable 12.5 Gram(s) IV Push once  docusate sodium 100 milliGRAM(s) Oral three times a day  doxazosin 2 milliGRAM(s) Oral at bedtime  furosemide    Tablet 40 milliGRAM(s) Oral two times a day  heparin  Injectable 5000 Unit(s) SubCutaneous every 8 hours  influenza   Vaccine 0.5 milliLiter(s) IntraMuscular once  insulin lispro (HumaLOG) corrective regimen sliding scale   SubCutaneous three times a day before meals  insulin lispro (HumaLOG) corrective regimen sliding scale   SubCutaneous at bedtime  methylPREDNISolone sodium succinate Injectable 20 milliGRAM(s) IV Push daily  metoprolol tartrate 50 milliGRAM(s) Oral two times a day  NIFEdipine XL 90 milliGRAM(s) Oral daily  pantoprazole    Tablet 40 milliGRAM(s) Oral before breakfast  senna 2 Tablet(s) Oral at bedtime  sertraline 200 milliGRAM(s) Oral daily  simvastatin 20 milliGRAM(s) Oral at bedtime    MEDICATIONS  (PRN):  acetaminophen   Tablet .. 650 milliGRAM(s) Oral every 6 hours PRN Temp greater or equal to 38C (100.4F)  dextrose 40% Gel 15 Gram(s) Oral once PRN Blood Glucose LESS THAN 70 milliGRAM(s)/deciliter  hydrALAZINE 100 milliGRAM(s) Oral every 8 hours PRN Systolic blood pressure > 170  oxyCODONE    IR 5 milliGRAM(s) Oral every 4 hours PRN Severe Pain (7 - 10)      Allergies    No Known Allergies    Intolerances      FAMILY HISTORY:  No pertinent family history in first degree relatives: No hx of CAD in his children      Vital Signs Last 24 Hrs  T(C): 36.8 (15 Nov 2018 11:28), Max: 38.2 (2018 18:52)  T(F): 98.2 (15 Nov 2018 11:28), Max: 100.8 (2018 18:52)  HR: 58 (15 Nov 2018 11:28) (58 - 74)  BP: 129/61 (15 Nov 2018 11:28) (129/61 - 170/67)  BP(mean): --  RR: 18 (15 Nov 2018 11:28) (16 - 18)  SpO2: 96% (15 Nov 2018 11:28) (90% - 99%)    Daily     Daily     Physical Exam:  General: No apparent distress  HEENT: EOMI, MMM  CVS: +S1/S2, RRR  Resp: +crackles at bases b/l  MSK: right ankle and first MTP with erythema, swelling and TTP   ankle ROM present but with tremendous pain with movement  No other joint abnormality appreciated  Neuro: AAOx3  Skin: no rash, no tophi     LABS:                        8.6    4.01  )-----------( 123      ( 15 Nov 2018 07:56 )             27.1     11-15    139  |  98  |  55<H>  ----------------------------<  155<H>  3.3<L>   |  28  |  2.44<H>    Ca    9.5      15 Nov 2018 06:20    TPro  6.7  /  Alb  3.7  /  TBili  x   /  DBili  x   /  AST  x   /  ALT  x   /  AlkPhos  x   11-14      Urinalysis Basic - ( 2018 12:56 )    Color: Light Yellow / Appearance: Clear / S.016 / pH: x  Gluc: x / Ketone: Negative  / Bili: Negative / Urobili: Negative   Blood: x / Protein: 30 mg/dL / Nitrite: Negative   Leuk Esterase: Negative / RBC: 4 /hpf / WBC 1 /hpf   Sq Epi: x / Non Sq Epi: 0 /hpf / Bacteria: Negative        RADIOLOGY & ADDITIONAL STUDIES:  < from: Xray Ankle Complete 3 Views, Right (11.13.18 @ 14:53) >    EXAM: AP, lateral and oblique views of the right ankle.    COMPARISON: None      IMPRESSION:   There is no acute fracture or dislocation. Joint alignment is intact.   There is a small tibiotalar joint effusion. Soft tissues are intact.
Nephrology Consultation: MD RONAL CastrejonJANE  83y    HPI:  83M, trilingual Latvian/Frisian speaking, c hx CAD s/p 6 stents, CHF, CKD, DM2, HTN, HLD, chronic R lower back pain and chronic suprapubic pain, former smoker, presents with 3 days of increasing SOB, leg swelling, and chest pain. He has been taking all of his medications except the lasix for the past 4 days, because he was urinating too often. Pt [place] in [facility] and goes to [facility] in [place], but since his wife passed away 1.5 months ago, has been living with his daughter Mary Verdin in [place]. Pt was last hospitalized about 6-7 months ago with the same SOB. Over the past 3 days, reports increasing SOB, respiratory distress, leg swelling, some right chest pain that radiates to b/l arms, and some right flank pain that started yesterday. Denies fevers, chills, URI symptoms. Has CKD with a baseline creatinine of arund 2.0 as per the family and has been seeing a nephrologist in [facility]. Denies any use fo NSAIDS. Denies any fever, chills, head ache, N/V/D. Denies any use of ACEI/ARB.        PAST MEDICAL & SURGICAL HISTORY:  HTN (hypertension)  CHF (congestive heart failure)  T2DM (type 2 diabetes mellitus)  HLD (hyperlipidemia)  No significant past surgical history      Allergies    No Known Allergies    Intolerances        Home Medications:  Aspirin Enteric Coated 81 mg oral delayed release tablet: 1 tab(s) orally once a day (10 Nov 2018 13:33)  Ativan 1 mg oral tablet: 1 tab(s) orally once a day (at bedtime), As Needed (10 Nov 2018 13:33)  Breo Ellipta 100 mcg-25 mcg/inh inhalation powder: 1 puff(s) inhaled once a day (10 Nov 2018 13:33)  doxazosin 2 mg oral tablet: 1 tab(s) orally once a day (at bedtime) (10 Nov 2018 13:33)  GlipiZIDE XL 5 mg oral tablet, extended release: 1 tab(s) orally once a day (10 Nov 2018 13:33)  hydrALAZINE: 125 milligram(s) orally 3 times a day (10 Nov 2018 13:33)  Lantus 100 units/mL subcutaneous solution: 8 unit(s) subcutaneous once a day (at bedtime) (10 Nov 2018 13:33)  Lasix 80 mg oral tablet: 1 tab(s) orally once a day (10 Nov 2018 13:33)  metoprolol succinate 100 mg oral tablet, extended release: 1 tab(s) orally once a day (10 Nov 2018 13:33)  NIFEdipine 60 mg oral tablet, extended release: 2 tab(s) orally once a day (10 Nov 2018 13:33)  pantoprazole 40 mg oral delayed release tablet: 1 tab(s) orally once a day (10 Nov 2018 13:33)  Percocet 5/325 oral tablet: 1 tab(s) orally 2 times a day, As Needed (10 Nov 2018 13:33)  Plavix 75 mg oral tablet: 1 tab(s) orally once a day (10 Nov 2018 13:33)  potassium chloride 20 mEq oral tablet, extended release: 1 tab(s) orally 3 times a day (10 Nov 2018 13:33)  Senna 8.6 mg oral tablet: 2 tab(s) orally once a day (at bedtime) (10 Nov 2018 13:33)  sertraline 100 mg oral tablet: 2 tab(s) orally once a day (10 Nov 2018 13:33)  Zocor 20 mg oral tablet: 1 tab(s) orally once a day (at bedtime) (10 Nov 2018 13:33)        FAMILY HISTORY:  No pertinent family history in first degree relatives: No hx of CAD in his children      SOCIAL HISTORY:    REVIEW OF SYSTEMS:    Constitutional: No fever, weight loss or fatigue  Eyes: No eye pain, visual disturbances, or discharge  ENT:  No difficulty hearing, tinnitus, vertigo; No sinus or throat pain  Neck: No pain or stiffness  Breasts: No pain, masses or nipple discharge  Respiratory: No cough, wheezing, chills or hemoptysis  Cardiovascular: No chest pain, palpitations, shortness of breath, dizziness or leg swelling  Gastrointestinal: No abdominal or epigastric pain. No nausea, vomiting or hematemesis; No diarrhea or constipation. No melena or hematochezia.  Genitourinary: No dysuria, frequency, hematuria or incontinence  Rectal: No pain, hemorrhoids or incontinence  Neurological: No headaches, memory loss, loss of strength, numbness or tremors  Skin: No itching, burning, rashes or lesions   Lymph Nodes: No enlarged glands  Endocrine: No heat or cold intolerance; No hair loss  Musculoskeletal: No joint pain or swelling; No muscle, back or extremity pain  Psychiatric: No depression, anxiety, mood swings or difficulty sleeping  Heme/Lymph: No easy bruising or bleeding gums  Allergy and Immunologic: No hives or eczema    aspirin enteric coated 81 milliGRAM(s) Oral daily  buDESOnide 160 MICROgram(s)/formoterol 4.5 MICROgram(s) Inhaler 2 Puff(s) Inhalation two times a day  clopidogrel Tablet 75 milliGRAM(s) Oral daily  dextrose 40% Gel 15 Gram(s) Oral once PRN  dextrose 5%. 1000 milliLiter(s) IV Continuous <Continuous>  dextrose 50% Injectable 12.5 Gram(s) IV Push once  docusate sodium 100 milliGRAM(s) Oral three times a day  doxazosin 2 milliGRAM(s) Oral at bedtime  furosemide   Injectable 60 milliGRAM(s) IV Push two times a day  heparin  Injectable 5000 Unit(s) SubCutaneous every 8 hours  hydrALAZINE 100 milliGRAM(s) Oral every 8 hours PRN  influenza   Vaccine 0.5 milliLiter(s) IntraMuscular once  insulin lispro (HumaLOG) corrective regimen sliding scale   SubCutaneous three times a day before meals  insulin lispro (HumaLOG) corrective regimen sliding scale   SubCutaneous at bedtime  metoprolol tartrate 50 milliGRAM(s) Oral two times a day  NIFEdipine XL 90 milliGRAM(s) Oral daily  oxyCODONE    5 mG/acetaminophen 325 mG 1 Tablet(s) Oral every 6 hours PRN  pantoprazole    Tablet 40 milliGRAM(s) Oral before breakfast  senna 2 Tablet(s) Oral at bedtime  sertraline 200 milliGRAM(s) Oral daily  simvastatin 20 milliGRAM(s) Oral at bedtime      Vital Signs Last 24 Hrs  T(C): 36.6 (2018 11:17), Max: 37.4 (2018 04:16)  T(F): 97.9 (2018 11:17), Max: 99.3 (2018 04:16)  HR: 58 (2018 11:17) (57 - 71)  BP: 173/61 (2018 11:17) (151/68 - 185/75)  BP(mean): --  RR: 17 (2018 11:17) (2 - 18)  SpO2: 98% (2018 11:17) (94% - 100%)    PHYSICAL EXAM:    Constitutional: NAD, well-groomed, well-developed  HEENT: PERRLA, EOMI, Normal Hearing, MMM  Neck: No LAD, No JVD  Back: Normal spine flexure, No CVA tenderness  Respiratory: CTAB/L   Cardiovascular: S1 and S2, RRR, no M/G/R  Gastrointestinal: BS+, soft, NT/ND  Extremities: two plus peripheral edema  Vascular: 2+ peripheral pulses  Neurological: A/O x 3, no focal deficits  Skin: No rashes      LABS:                        9.5    3.55  )-----------( 104      ( 2018 08:17 )             29.1     -    145  |  105  |  44<H>  ----------------------------<  137<H>  2.9<LL>   |  27  |  2.47<H>    Ca    9.1      2018 05:28  Phos  4.4     11-10  Mg     2.3     -10    TPro  7.0  /  Alb  4.0  /  TBili  0.7  /  DBili  x   /  AST  11  /  ALT  7<L>  /  AlkPhos  59  11-10    PT/INR - ( 10 Nov 2018 05:51 )   PT: 12.8 sec;   INR: 1.12 ratio         PTT - ( 10 Nov 2018 05:51 )  PTT:25.3 sec  Urinalysis Basic - ( 2018 23:40 )    Color: Colorless / Appearance: Clear / S.007 / pH: x  Gluc: x / Ketone: Negative  / Bili: Negative / Urobili: Negative   Blood: x / Protein: Negative / Nitrite: Negative   Leuk Esterase: Negative / RBC: x / WBC x   Sq Epi: x / Non Sq Epi: x / Bacteria: x        MICROBIOLOGY:  RECENT CULTURES:  11-10 .Urine Catheterized XXXX XXXX   No growth          RADIOLOGY & ADDITIONAL STUDIES:    < from: CT Chest No Cont (11.10.18 @ 13:13) >    FINDINGS:    CHEST:     LUNGS AND LARGE AIRWAYS: Patent central airways.  Interlobular septal   thickening and scattered predominantly upper lobe groundglass opacities   consistentwith pulmonary edema. Mild bibasilar compressive atelectasis.  PLEURA: Small bilateral pleural effusions, right greater than left.  VESSELS: Atherosclerotic changes of the aorta and coronary arteries.  HEART: Cardiomegaly. No pericardial effusion.   MEDIASTINUM AND FINN: No lymphadenopathy.  CHEST WALL AND LOWER NECK: Within normal limits.  VISUALIZED UPPER ABDOMEN: Bilateral renal cysts. Additional hyperdense   bilateral renal foci are likely to represent hemorrhagic cysts. Mild   bilateral adrenal gland thickening..  BONES: Degenerative changes.     IMPRESSION:     Small bilateral pleural effusions with pulmonary edema.
Patient is a 83y old  Male who presents with a chief complaint of respiratory distress, SOB, chest pain (2018 16:26)      HPI:  83M, trilingual Slovak/Frisian speaking, c hx CAD s/p 6 stents (last stent 6 years ago), CHF, CKD (unknown baseline), DM2, HTN, HLD, chronic R lower back pain and chronic suprapubic pain, former smoker, presents with 3 days of increasing SOB, leg swelling, and chest pain.    History obtained from patient. At baseline, pt ambulates with a walker, and has significant difficulty ambulating up a flight of stairs. He has been taking all of his medications except the lasix for the past 4 days, because he was urinating too often. Pt normally lives in [facility] and goes to [facility] in [place], but since his wife passed away 1.5 months ago, has been living with his daughter Mary Verdin in [place]. Pt was last hospitalized about 6-7 months ago with the same SOB. Over the past 3 days, reports increasing SOB, respiratory distress, leg swelling, some right chest pain that radiates to b/l arms, and some right flank pain that started yesterday. Denies fevers, chills, URI symptoms. Not on O2 at home. He has had 4 falls in 2018, but not recently. BM yesterday.    VS: Tm 98.8, P 105, /71, R 32, 85% RA when I saw the patient. 100% on bipap  In the ED, received azithro, ceftri, lasix 80IV, tylenol, NTG  Above reviewed:   Pt was diagnosed with CHF exacerbation, has been treated for it. Yesterday started complaining of rt ankle pain, excruciating, unable to move the ankle with swelling and warmth. Overnight was febrile and so ID consulted for input. Pt currently still with pain but improved in comparison to yesterday. Still warmth and swelling but able to move it a little. Denies any other complains except constipation and some cough. Yesterday he was nauseous but resolved today.       PAST MEDICAL & SURGICAL HISTORY:  HTN (hypertension)  CHF (congestive heart failure)  T2DM (type 2 diabetes mellitus)  HLD (hyperlipidemia)  No significant past surgical history      REVIEW OF SYSTEMS    General: Some chills.  + Fevers    Skin: No rash  	  Ophthalmologic: Denies any visual complaints, discharge, redness.  	  ENMT: No nasal congestion or throat pain.     Respiratory and Thorax: Some cough, sputum. Shortness of breath improved.   	  Cardiovascular: No chest pain, palpitations.    Gastrointestinal: Resolved nausea, no abdominal pain or diarrhea.    Genitourinary: No dysuria, frequency. No flank pain.    Musculoskeletal: As per HPI.     Neurological:  No extremity weakness.    Psychiatric: No hallucinations	    Endocrine: No recent weight gain or loss. No abnormal heat/cold intolerance    Allergic/Immunologic: No hives or rash         Social history:  , lives with daughter. Former smoker.       FAMILY HISTORY:  Father with DM, HTN,  of MI.       Allergies  No Known Allergies        Antimicrobials:        Vital Signs Last 24 Hrs  T(C): 37.3 (2018 12:47), Max: 38.4 (2018 20:33)  T(F): 99.2 (2018 12:47), Max: 101.2 (2018 20:33)  HR: 74 (2018 17:18) (59 - 86)  BP: 150/55 (2018 17:18) (134/57 - 172/76)  RR: 20 (2018 12:47) (18 - 20)  SpO2: 91% (2018 12:47) (91% - 98%)      PHYSICAL EXAM: Patient in no acute distress.    Constitutional: Comfortable. Awake and alert    Eyes: No discharge or conjunctival injection    ENMT: No thrush. No pharyngeal exudate or erythema.    Neck: Supple,    Respiratory: Good air entry bilaterally with scattered rales.     Cardiovascular: S1 S2 wnl, + murmur.     Gastrointestinal: Soft BS(+) no tenderness, non distended.    Genitourinary: No CVA tenderness     Extremities: No edema. Chronic skin changes b/l lower extremities.     Vascular: peripheral pulses felt    Neurological: No grossly focal deficits.    Skin: No rash     Musculoskeletal: Rt ankle swelling with some warmth, ROM limited due to pain, + tenderness, no erythema.     Psychiatric: Affect normal.                              10.1   4.5   )-----------( 110      ( 2018 11:24 )             30.9       11-14    142  |  100  |  52<H>  ----------------------------<  134<H>  3.4<L>   |  28  |  2.45<H>    Ca    9.6      2018 06:01  Phos  4.4     -  Mg     2.3     -    TPro  7.1  /  Alb  3.8  /  TBili  0.5  /  DBili  x   /  AST  9<L>  /  ALT  6<L>  /  AlkPhos  50  11-14      Urinalysis (18 @ 12:56)    Nitrite: Negative    Leukocyte Esterase Concentration: Negative  Urine Microscopic-Add On (NC) (18 @ 12:56)    Red Blood Cell - Urine: 4 /hpf    White Blood Cell - Urine: 1 /hpf    Hyaline Casts: 0 /lpf    Bacteria: Negative    Epithelial Cells: 0 /hpf      Culture - Urine (11.10.18 @ 03:00)    Specimen Source: .Urine Catheterized    Culture Results:   No growth        Radiology: Imaging reviewed personally [ x]  < from: Xray Abdomen 1 View PORTABLE -Urgent (18 @ 23:47) >  IMPRESSION:   Nonobstructive bowel gas pattern.       < from: Xray Ankle Complete 3 Views, Right (18 @ 14:53) >  IMPRESSION:   There is no acute fracture or dislocation. Joint alignment is intact.   There is a small tibiotalar joint effusion. Soft tissues are intact.      < from: US Renal (18 @ 10:46) >  IMPRESSION:     No hydronephrosis or calculus.      < from: VA Duplex Lower Ext Vein Scan, Bilat (18 @ 10:08) >  IMPRESSION:     No evidence of bilateral lower extremity deep venous thrombosis.      < from: CT Chest No Cont (11.10.18 @ 13:13) >  IMPRESSION:     Small bilateral pleural effusions with pulmonary edema.      < from: Xray Chest 1 View- PORTABLE-Urgent (18 @ 22:08) >  IMPRESSION:   Mild pulmonary edema with trace bilateral pleural effusions.
Patient seen and evaluated at bedside    Chief Complaint:    HPI:  83M, trilingual Dutch/Lithuanian speaking, c hx CAD s/p 6 stents (last stent 6 years ago), CHF, CKD (unknown baseline), DM2, HTN, HLD, chronic R lower back pain and chronic suprapubic pain, former smoker, presents with 3 days of increasing SOB, leg swelling, and chest pain.    History obtained from patient. At baseline, pt ambulates with a walker, and has significant difficulty ambulating up a flight of stairs. He has been taking all of his medications except the lasix for the past 4 days, because he was urinating too often. [place] [facility] [facility] in [place], but since his wife passed away 1.5 months ago, has been living with his daughter Mary Verdin in [place]. Pt was last hospitalized about 6-7 months ago with the same SOB. Over the past 3 days, reports increasing SOB, respiratory distress, leg swelling, some right chest pain that radiates to b/l arms, and some right flank pain that started yesterday. Denies fevers, chills, URI symptoms. Not on O2 at home. He has had 4 falls in 2018, but not recently. BM yesterday.    VS: Tm 98.8, P 105, /71, R 32, 85% RA when I saw the patient. 100% on bipap  In the ED, received azithro, ceftri, lasix 80IV, tylenol, NTG    ISTOP Reference #: [id number]  Rx Written	Rx Dispensed	Drug	Quantity	Days Supply	Prescriber Name  10/08/2018	10/09/2018	oxycodone-acetaminophen 5-325 mg tab	60	30	Anthony Arellano MD  08/28/2018	09/10/2018	oxycodone-acetaminophen 5-325 mg tab	60	30	Anthony Arellano MD  08/29/2018	08/30/2018	lorazepam 1 mg tablet	60	30	Allan Monreal (10 Nov 2018 04:47)      PMH:   HTN (hypertension)  CHF (congestive heart failure)  T2DM (type 2 diabetes mellitus)  HLD (hyperlipidemia)      PSH:   No significant past surgical history      Medications:   aspirin enteric coated 81 milliGRAM(s) Oral daily  buDESOnide 160 MICROgram(s)/formoterol 4.5 MICROgram(s) Inhaler 2 Puff(s) Inhalation two times a day  clopidogrel Tablet 75 milliGRAM(s) Oral daily  dextrose 40% Gel 15 Gram(s) Oral once PRN  dextrose 5%. 1000 milliLiter(s) IV Continuous <Continuous>  dextrose 50% Injectable 12.5 Gram(s) IV Push once  docusate sodium 100 milliGRAM(s) Oral three times a day  doxazosin 2 milliGRAM(s) Oral at bedtime  furosemide    Tablet 40 milliGRAM(s) Oral two times a day  heparin  Injectable 5000 Unit(s) SubCutaneous every 8 hours  hydrALAZINE 100 milliGRAM(s) Oral every 8 hours PRN  influenza   Vaccine 0.5 milliLiter(s) IntraMuscular once  insulin lispro (HumaLOG) corrective regimen sliding scale   SubCutaneous three times a day before meals  insulin lispro (HumaLOG) corrective regimen sliding scale   SubCutaneous at bedtime  metoprolol tartrate 50 milliGRAM(s) Oral two times a day  NIFEdipine XL 90 milliGRAM(s) Oral daily  oxyCODONE    IR 5 milliGRAM(s) Oral every 4 hours PRN  pantoprazole    Tablet 40 milliGRAM(s) Oral before breakfast  senna 2 Tablet(s) Oral at bedtime  sertraline 200 milliGRAM(s) Oral daily  simvastatin 20 milliGRAM(s) Oral at bedtime      Allergies:  No Known Allergies      FAMILY HISTORY:  No pertinent family history in first degree relatives: No hx of CAD in his children      Social History:  Smoking History:  Alcohol Use:  Drug Use:    Review of Systems:  REVIEW OF SYSTEMS:  CONSTITUTIONAL: No weakness, fevers or chills  EYES/ENT: No visual changes;  No dysphagia  NECK: No pain or stiffness  RESPIRATORY: No cough, wheezing, hemoptysis; No shortness of breath  CARDIOVASCULAR: No chest pain or palpitations; No lower extremity edema  GASTROINTESTINAL: No abdominal or epigastric pain. No nausea, vomiting, or hematemesis; No diarrhea or constipation. No melena or hematochezia.  BACK: No back pain  GENITOURINARY: No dysuria, frequency or hematuria  NEUROLOGICAL: No numbness or weakness  SKIN: No itching, burning, rashes, or lesions   All other review of systems is negative unless indicated above.    Physical Exam:  T(F): 99.2 (11-14), Max: 101.2 (11-13)  HR: 59 (11-14) (59 - 86)  BP: 134/57 (11-14) (134/57 - 184/69)  RR: 20 (11-14)  SpO2: 91% (11-14)  GENERAL: No acute distress, well-developed  HEAD:  Atraumatic, Normocephalic  ENT: EOMI, PERRLA, conjunctiva and sclera clear, Neck supple, No JVD, moist mucosa  CHEST/LUNG: Clear to auscultation bilaterally; No wheeze, equal breath sounds bilaterally   BACK: No spinal tenderness  HEART: Regular rate and rhythm; No murmurs, rubs, or gallops  ABDOMEN: Soft, Nontender, Nondistended; Bowel sounds present  EXTREMITIES:  No clubbing, cyanosis, or edema  PSYCH: Nl behavior, nl affect  NEUROLOGY: AAOx3, non-focal, cranial nerves intact  SKIN: Normal color, No rashes or lesions  LINES:    Cardiovascular Diagnostic Testing:    ECG: Personally reviewed    Echo:    Stress Testing:    Cath:    Interpretation of Telemetry:    Imaging:    Labs: Personally reviewed                        10.1   4.5   )-----------( 110      ( 14 Nov 2018 11:24 )             30.9     11-14    142  |  100  |  52<H>  ----------------------------<  134<H>  3.4<L>   |  28  |  2.45<H>    Ca    9.6      14 Nov 2018 06:01  Phos  4.4     11-13  Mg     2.3     11-13    TPro  7.1  /  Alb  3.8  /  TBili  0.5  /  DBili  x   /  AST  9<L>  /  ALT  6<L>  /  AlkPhos  50  11-14          Serum Pro-Brain Natriuretic Peptide: 4122 pg/mL (11-09 @ 22:34)      Hemoglobin A1C, Whole Blood: 4.7 % (11-10 @ 08:04)    Thyroid Stimulating Hormone, Serum: 1.22 uIU/mL (11-10 @ 08:06)

## 2018-11-20 NOTE — CONSULT NOTE ADULT - PROBLEM SELECTOR RECOMMENDATION 9
- consulted to evaluate MR for suitability of Mitraclip  - it appears the anatomy is suitable for Mitraclip  - also noted on MARBIN: pt has moderate-severe AI.  We will need to discuss this AI in light of the severe MR  - cath shows nonobstructive CAD

## 2018-11-20 NOTE — PROGRESS NOTE ADULT - SUBJECTIVE AND OBJECTIVE BOX
Cardiology Progress Note    Interval: Pt resting comfortably in bed. Noted mild SOB but overall improving. No chest pain.    Tele: Sinus 50-60s    HPI:  83M, trilingual Montserratian/Georgian speaking, c hx CAD s/p 6 stents (last stent 6 years ago), CHF, CKD (unknown baseline), DM2, HTN, HLD, chronic R lower back pain and chronic suprapubic pain, former smoker, presents with 3 days of increasing SOB, leg swelling, and chest pain.    History obtained from patient. At baseline, pt ambulates with a walker, and has significant difficulty ambulating up a flight of stairs. He has been taking all of his medications except the lasix for the past 4 days, because he was urinating too often. Pt normally lives in [facility] and goes to [facility] in [place], but since his wife passed away 1.5 months ago, has been living with his daughter Mary Verdin in [place]. Pt was last hospitalized about 6-7 months ago with the same SOB. Over the past 3 days, reports increasing SOB, respiratory distress, leg swelling, some right chest pain that radiates to b/l arms, and some right flank pain that started yesterday. Denies fevers, chills, URI symptoms. Not on O2 at home. He has had 4 falls in 2018, but not recently. BM yesterday.    VS: Tm 98.8, P 105, /71, R 32, 85% RA when I saw the patient. 100% on bipap  In the ED, received azithro, ceftri, lasix 80IV, tylenol, NTG    ISTOP Reference #: [id number]  Rx Written	Rx Dispensed	Drug	Quantity	Days Supply	Prescriber Name  10/08/2018	10/09/2018	oxycodone-acetaminophen 5-325 mg tab	60	30	Anthony Arellano MD  2018	09/10/2018	oxycodone-acetaminophen 5-325 mg tab	60	30	Anthony Arellano MD  2018	lorazepam 1 mg tablet	60	30	Allan Monreal (10 Nov 2018 04:47)      Medications:  acetaminophen   Tablet .. 650 milliGRAM(s) Oral every 6 hours PRN  acetylcysteine  Oral Solution 600 milliGRAM(s) Oral every 12 hours  aspirin enteric coated 81 milliGRAM(s) Oral daily  buDESOnide 160 MICROgram(s)/formoterol 4.5 MICROgram(s) Inhaler 2 Puff(s) Inhalation two times a day  clopidogrel Tablet 75 milliGRAM(s) Oral daily  dextrose 40% Gel 15 Gram(s) Oral once PRN  dextrose 5%. 1000 milliLiter(s) IV Continuous <Continuous>  dextrose 50% Injectable 12.5 Gram(s) IV Push once  docusate sodium 100 milliGRAM(s) Oral three times a day  doxazosin 2 milliGRAM(s) Oral at bedtime  heparin  Injectable 5000 Unit(s) SubCutaneous every 8 hours  hydrALAZINE 100 milliGRAM(s) Oral every 8 hours PRN  influenza   Vaccine 0.5 milliLiter(s) IntraMuscular once  insulin glargine Injectable (LANTUS) 5 Unit(s) SubCutaneous at bedtime  insulin lispro (HumaLOG) corrective regimen sliding scale   SubCutaneous three times a day before meals  insulin lispro (HumaLOG) corrective regimen sliding scale   SubCutaneous at bedtime  metoprolol tartrate 50 milliGRAM(s) Oral two times a day  NIFEdipine XL 60 milliGRAM(s) Oral <User Schedule>  oxyCODONE    IR 5 milliGRAM(s) Oral every 4 hours PRN  pantoprazole    Tablet 40 milliGRAM(s) Oral before breakfast  predniSONE   Tablet 30 milliGRAM(s) Oral daily  senna 2 Tablet(s) Oral at bedtime  sertraline 200 milliGRAM(s) Oral daily  simvastatin 20 milliGRAM(s) Oral at bedtime      Review of Systems:  Constitutional: [ ] Fever [ ] Chills [ ] Fatigue [ ] Weight change   HEENT: [ ] Blurred vision [ ] Eye Pain [ ] Headache [ ] Runny nose [ ] Sore Throat   Respiratory: [ ] Cough [ ] Wheezing [x] Shortness of breath  Cardiovascular: [ ] Chest Pain [ ] Palpitations [x] FLOREZ [ ] PND [ ] Orthopnea  Gastrointestinal: [ ] Abdominal Pain [ ] Diarrhea [ ] Constipation [ ] Hemorrhoids [ ] Nausea [ ] Vomiting  Genitourinary: [ ] Nocturia [ ] Dysuria [ ] Incontinence  Extremities: [ ] Swelling [ ] Joint Pain  Neurologic: [ ] Focal deficit [ ] Paresthesias [ ] Syncope  Lymphatic: [ ] Swelling [ ] Lymphadenopathy   Skin: [ ] Rash [ ] Ecchymoses [ ] Wounds [ ] Lesions  Psychiatry: [ ] Depression [ ] Suicidal/Homicidal Ideation [ ] Anxiety [ ] Sleep Disturbances  [ ] 10 point review of systems is otherwise negative except as mentioned above            [ ]Unable to obtain    Vitals:  T(C): 36.2 (18 @ 04:17), Max: 36.6 (18 @ 21:15)  HR: 68 (18 @ 04:17) (53 - 72)  BP: 162/74 (18 @ 04:17) (107/52 - 175/71)  BP(mean): --  RR: 18 (18 @ 04:17) (16 - 18)  SpO2: 99% (18 @ 04:17) (96% - 100%)  Wt(kg): --  Daily     Daily Weight in k.2 (2018 11:45)  I&O's Summary    2018 07:01  -  2018 07:00  --------------------------------------------------------  IN: 840 mL / OUT: 1875 mL / NET: -1035 mL        Physical Exam:  General: NAD  Eye: PERRL, EOMI  HENT: Normal oral mucosa NC/AT  CV: Normal S1/S2, RRR, holosystolic murmur, no edema, elevation in JVP  Resp: Normal respiratory effort on nasal cannula, coarse breath sounds, no wheezing, no crackles  Abd: Soft, Non-tender, Non-distended, BS+  Ext: No clubbing, No joint deformity   Neuro: Non-focal, motor and sensory intact  Lymph: No lymphadenopathy  Psych: AAOx3, Mood & affect appropriate  Skin: No rashes, No ecchymoses, No cyanosis    Labs:                        8.5    3.98  )-----------( 159      ( 2018 09:05 )             26.8         143  |  104  |  62<H>  ----------------------------<  207<H>  3.5   |  28  |  2.32<H>    Ca    9.4      2018 06:21  Phos  3.6       Mg     2.6         TPro  x   /  Alb  3.4  /  TBili  x   /  DBili  x   /  AST  x   /  ALT  x   /  AlkPhos  x                     New results/imaging: Cardiology Progress Note    Interval: Pt resting comfortably in bed. Noted mild SOB but overall improving. No chest pain.    Tele: Sinus 50-60s    HPI:  83M, trilingual Fijian/Tanzanian speaking, c hx CAD s/p 6 stents (last stent 6 years ago), CHF, CKD (unknown baseline), DM2, HTN, HLD, chronic R lower back pain and chronic suprapubic pain, former smoker, presents with 3 days of increasing SOB, leg swelling, and chest pain.    History obtained from patient. At baseline, pt ambulates with a walker, and has significant difficulty ambulating up a flight of stairs. He has been taking all of his medications except the lasix for the past 4 days, because he was urinating too often. Pt normally lives in [facility] and goes to [facility] in [place], but since his wife passed away 1.5 months ago, has been living with his daughter Mary Verdin in [place]. Pt was last hospitalized about 6-7 months ago with the same SOB. Over the past 3 days, reports increasing SOB, respiratory distress, leg swelling, some right chest pain that radiates to b/l arms, and some right flank pain that started yesterday. Denies fevers, chills, URI symptoms. Not on O2 at home. He has had 4 falls in 2018, but not recently. BM yesterday.    VS: Tm 98.8, P 105, /71, R 32, 85% RA when I saw the patient. 100% on bipap  In the ED, received azithro, ceftri, lasix 80IV, tylenol, NTG    ISTOP Reference #: [id number]  Rx Written	Rx Dispensed	Drug	Quantity	Days Supply	Prescriber Name  10/08/2018	10/09/2018	oxycodone-acetaminophen 5-325 mg tab	60	30	Anthony Arellano MD  2018	09/10/2018	oxycodone-acetaminophen 5-325 mg tab	60	30	Anthony Arellano MD  2018	lorazepam 1 mg tablet	60	30	Allan Monreal (10 Nov 2018 04:47)      Medications:  acetaminophen   Tablet .. 650 milliGRAM(s) Oral every 6 hours PRN  acetylcysteine  Oral Solution 600 milliGRAM(s) Oral every 12 hours  aspirin enteric coated 81 milliGRAM(s) Oral daily  buDESOnide 160 MICROgram(s)/formoterol 4.5 MICROgram(s) Inhaler 2 Puff(s) Inhalation two times a day  clopidogrel Tablet 75 milliGRAM(s) Oral daily  dextrose 40% Gel 15 Gram(s) Oral once PRN  dextrose 5%. 1000 milliLiter(s) IV Continuous <Continuous>  dextrose 50% Injectable 12.5 Gram(s) IV Push once  docusate sodium 100 milliGRAM(s) Oral three times a day  doxazosin 2 milliGRAM(s) Oral at bedtime  heparin  Injectable 5000 Unit(s) SubCutaneous every 8 hours  hydrALAZINE 100 milliGRAM(s) Oral every 8 hours PRN  influenza   Vaccine 0.5 milliLiter(s) IntraMuscular once  insulin glargine Injectable (LANTUS) 5 Unit(s) SubCutaneous at bedtime  insulin lispro (HumaLOG) corrective regimen sliding scale   SubCutaneous three times a day before meals  insulin lispro (HumaLOG) corrective regimen sliding scale   SubCutaneous at bedtime  metoprolol tartrate 50 milliGRAM(s) Oral two times a day  NIFEdipine XL 60 milliGRAM(s) Oral <User Schedule>  oxyCODONE    IR 5 milliGRAM(s) Oral every 4 hours PRN  pantoprazole    Tablet 40 milliGRAM(s) Oral before breakfast  predniSONE   Tablet 30 milliGRAM(s) Oral daily  senna 2 Tablet(s) Oral at bedtime  sertraline 200 milliGRAM(s) Oral daily  simvastatin 20 milliGRAM(s) Oral at bedtime      Review of Systems:  Constitutional: [ ] Fever [ ] Chills [ ] Fatigue [ ] Weight change   HEENT: [ ] Blurred vision [ ] Eye Pain [ ] Headache [ ] Runny nose [ ] Sore Throat   Respiratory: [ ] Cough [ ] Wheezing [x] Shortness of breath  Cardiovascular: [ ] Chest Pain [ ] Palpitations [x] FLOREZ [ ] PND [ ] Orthopnea  Gastrointestinal: [ ] Abdominal Pain [ ] Diarrhea [ ] Constipation [ ] Hemorrhoids [ ] Nausea [ ] Vomiting  Genitourinary: [ ] Nocturia [ ] Dysuria [ ] Incontinence  Extremities: [ ] Swelling [ ] Joint Pain  Neurologic: [ ] Focal deficit [ ] Paresthesias [ ] Syncope  Lymphatic: [ ] Swelling [ ] Lymphadenopathy   Skin: [ ] Rash [ ] Ecchymoses [ ] Wounds [ ] Lesions  Psychiatry: [ ] Depression [ ] Suicidal/Homicidal Ideation [ ] Anxiety [ ] Sleep Disturbances  [x ] 10 point review of systems is otherwise negative except as mentioned above            [ ]Unable to obtain    Vitals:  T(C): 36.2 (18 @ 04:17), Max: 36.6 (18 @ 21:15)  HR: 68 (18 @ 04:17) (53 - 72)  BP: 162/74 (18 @ 04:17) (107/52 - 175/71)  BP(mean): --  RR: 18 (18 @ 04:17) (16 - 18)  SpO2: 99% (18 @ 04:17) (96% - 100%)  Wt(kg): --  Daily     Daily Weight in k.2 (2018 11:45)  I&O's Summary    2018 07:01  -  2018 07:00  --------------------------------------------------------  IN: 840 mL / OUT: 1875 mL / NET: -1035 mL        Physical Exam:  General: NAD  Eye: PERRL, EOMI  HENT: Normal oral mucosa NC/AT  CV: Normal S1/S2, RRR, holosystolic murmur, no edema, elevation in JVP  Resp: Normal respiratory effort on nasal cannula, coarse breath sounds, no wheezing, no crackles  Abd: Soft, Non-tender, Non-distended, BS+  Ext: No clubbing, No joint deformity   Neuro: Non-focal, motor and sensory intact  Lymph: No lymphadenopathy  Psych: AAOx3, Mood & affect appropriate  Skin: No rashes, No ecchymoses, No cyanosis    Labs:                        8.5    3.98  )-----------( 159      ( 2018 09:05 )             26.8         143  |  104  |  62<H>  ----------------------------<  207<H>  3.5   |  28  |  2.32<H>    Ca    9.4      2018 06:21  Phos  3.6       Mg     2.6         TPro  x   /  Alb  3.4  /  TBili  x   /  DBili  x   /  AST  x   /  ALT  x   /  AlkPhos  x                     New results/imaging:

## 2018-11-20 NOTE — PROGRESS NOTE ADULT - PROBLEM SELECTOR PLAN 6
- cont asa, plavix as above + zocor  - ischemic eval once clinically improved
- A1c 4.7  - pt states he has been giving himself variable lantus, but usually around 7U a night.  - monitoring off basal insulin
- cont asa, plavix as above + zocor  - ischemic eval once clinically improved
- cont asa, plavix as above + zocor  - ischemic eval once clinically improved
- cont asa, plavix, zocor  - cardiac cath on monday
- cont asa, plavix, zocor  - cardiac cath on monday
- cont asa, plavix, zocor  - cardiac cath today
- cont asa, plavix, zocor  - s/p cardiac cath without intervention.
- cont asa, plavix as above + zocor  - ischemic eval once clinically improved
- cont pt's home BP meds at reduced doses  - cont metoprolol 50 BID + nifedpine 90 xl + hydralazine 100 PRN

## 2018-11-20 NOTE — PROGRESS NOTE ADULT - PROBLEM SELECTOR PLAN 3
- doubt plaque rupture acs  - resolved, likley from chf exacerbation.  - tele  - cont asa, plavix  - consider further ischemic eval pending resolution of CHF exacerbation  - cont metoprolol, hydralazine prn
Cont lasix to 40mg PO BID with improved symptoms.   LE without any edema and minimal lung findings.  Echo with severe MR which is new compared to old echo from 2015.   D/w card team. MARBIN to eval and will likely need intervention such as MV clip.   - strict I&O monitor renal function.   - cont metoprolol, nifedpine.
Unknown baseline . Diuresis as above. nephrology following.  Successful TOV. monitor outpt.   - renal u/s with only simple cyst without obstruction.   - percocet PRN for chronic back pain
fairly euvolemic but having orthopnea (needs to lay flat for cath cuate)  -CXR shows improvement  -per cards, give IV lasix 80mg x 1 today and c/w po lasix 40mg bid daily  -increase nifedipine to 60mg bid (SBP uncontrolled)  -c/w metoprolol 50mg bid  -Echo with severe MR, AR which is new compared to old echo from 2015  -Structural Heart rec cardiac cath monday with outpatient plans for mitral valve clip  - strict I&O monitor renal function.
fairly euvolemic but having orthopnea (needs to lay flat for cath cuate)  -CXR shows improvement s/p extra IV lasix 80mg x 1 yesterday in anticipation for cath today. cont current dose Lasix now.   -increased nifedipine to 60mg bid (SBP uncontrolled)  -c/w metoprolol 50mg bid  -Echo with severe MR, AR which is new compared to old echo from 2015  -Structural Heart rec cardiac cath today with outpatient plans for mitral valve clip  - strict I&O monitor renal function.
fairly euvolemic,  Echo with severe MR, AR which is new compared to old echo from 2015  Structural  Heart rec cardiac cath monday with outpatient plans for mitral valve clip  - strict I&O monitor renal function.   - cont metoprolol, nifedpine  -Cont lasix to 40mg PO BID,
sec to CHF on admission. now much improved.   - trial and monitor off oxygen, off Bipap.   - CT chest showed b/l pleural effusions, pulmonary edema on admission.   - hold off antibiotics, as pt does not endorse s/s active infection/URI/PNA symptoms
Cont lasix to 40mg PO BID with improved symptoms.   LE without any edema and minimal lung findings.  Echo with severe MR which is new compared to old echo from 2015.   D/w card team. MARBIN to eval and will likely need intervention such as MV clip.   - strict I&O monitor renal function.   - cont metoprolol, nifedpine.
No known trauma.   very sensitive to touch without erythema or swellling.   check xray. may consider colchicine although no h/o gout in the past.
Cont lasix to 40mg PO BID with improved symptoms.   LE without any edema and minimal lung findings.  Echo with severe MR. d/w family who reports h/o leaky valve but mild.   called outpt card office but closed today will try again tomorrow.   Suspect decompensation due to noncompliance with diuretic per patient.   - strict I&O monitor renal function.   - cont metoprolol, nifedpine.  Card felaal called.

## 2018-11-20 NOTE — PROGRESS NOTE ADULT - PROBLEM SELECTOR PLAN 7
- cont pt's home BP meds at reduced doses  - cont metoprolol 50 BID + nifedpine 90 xl + hydralazine 100 PRN
- BP stable  - cont metoprolol 50 BID + nifedpine 90 xl + hydralazine 100 PRN
- cont doxazosin  successful TOV
- cont pt's home BP meds at reduced doses  - cont metoprolol 50 BID + nifedpine 90 xl + hydralazine 100 PRN
- cont pt's home BP meds at reduced doses  - cont metoprolol 50 BID + nifedpine 90 xl + hydralazine 100 PRN
- A1c 4.7  - pt states he has been giving himself variable lantus, but usually around 7U a night.  - monitoring off basal insulin
- cont pt's home BP meds at reduced doses  - cont metoprolol 50 BID + nifedpine 90 xl + hydralazine 100 PRN

## 2018-11-20 NOTE — CONSULT NOTE ADULT - REASON FOR ADMISSION
respiratory distress, SOB, chest pain

## 2018-11-20 NOTE — PROGRESS NOTE ADULT - PROVIDER SPECIALTY LIST ADULT
Cardiology
Hospitalist
Infectious Disease
Infectious Disease
Internal Medicine
Nephrology
Rheumatology
Structural Heart
Nephrology
Cardiology
Rheumatology
Internal Medicine

## 2018-11-20 NOTE — PROGRESS NOTE ADULT - PROBLEM SELECTOR PROBLEM 3
Acute respiratory failure with hypoxia
Acute systolic congestive heart failure
Other chest pain
RAMÍREZ (acute kidney injury)
Acute systolic congestive heart failure
Foot pain, right
Acute systolic congestive heart failure

## 2018-11-20 NOTE — PROGRESS NOTE ADULT - PROBLEM SELECTOR PROBLEM 9
Benign prostatic hyperplasia without lower urinary tract symptoms
Advanced care planning/counseling discussion
Benign prostatic hyperplasia without lower urinary tract symptoms

## 2018-11-20 NOTE — PROGRESS NOTE ADULT - NSHPATTENDINGPLANDISCUSS_GEN_ALL_CORE
Medicine. To reach Cardiology Attending call during weekdays Spectra [phone] or Fellow [phone].
To reach Cardiology Attending call during weekdays Spectra [phone] or Fellow [phone].
NP
ALYSHA Mcginnis
ALYSHA Owens
NP

## 2018-11-20 NOTE — PROGRESS NOTE ADULT - PROBLEM SELECTOR PROBLEM 4
Acute respiratory failure with hypoxia
Coronary artery disease involving native coronary artery of native heart, angina presence unspecified
Mitral valve insufficiency, unspecified etiology
RAMÍREZ (acute kidney injury)
Acute respiratory failure with hypoxia
RAMÍREZ (acute kidney injury)

## 2018-11-20 NOTE — PROGRESS NOTE ADULT - PROBLEM SELECTOR PLAN 2
Diuresis on hold due to recent cath.   -c/w nifedipine to 60mg bid (SBP uncontrolled)  -c/w metoprolol 50mg bid  -Echo with severe MR, AR which is new compared to old echo from 2015  - outpatient plans for mitral valve clip  - strict I&O monitor renal function.  per renal hold lasix until follow up with renal outpt.

## 2018-11-20 NOTE — CONSULT NOTE ADULT - CONSULT REASON
Fever
Possible MitraClip
RAMÍREZ
Severe Mitral Valve Regurgitation
ankle pain
severe MR
severe mitral regurg

## 2018-11-20 NOTE — PROGRESS NOTE ADULT - PROBLEM SELECTOR PLAN 9
- cont doxazosin  successful TOV
- cont doxazosin  -TOV today.
- cont doxazosin  successful TOV
- cont doxazosin,   successful TOV, voiding
- cont doxazosin  successful TOV
- pt requests full code  - pt has 4 children, but wants his daughter Mary Perez to make decisions for him if pt is unable to.  - pt requests full medical management

## 2018-11-20 NOTE — PROGRESS NOTE ADULT - PROBLEM SELECTOR PROBLEM 2
Acute respiratory failure with hypoxia
Acute systolic congestive heart failure
Acute systolic congestive heart failure
Fever, unspecified fever cause
Foot pain, right
Acute respiratory failure with hypoxia
Foot pain, right
Foot pain, right

## 2018-11-20 NOTE — PROGRESS NOTE ADULT - PROBLEM SELECTOR PROBLEM 7
Essential hypertension
Benign prostatic hyperplasia without lower urinary tract symptoms
Essential hypertension
Type 2 diabetes mellitus with complication, with long-term current use of insulin

## 2018-11-20 NOTE — PROGRESS NOTE ADULT - ASSESSMENT
A/P: 83M w/ PMHx of CAD (s/p PCI x 6), reported "CHF" (nl LVSF on TTE, valvular HF 2/2 severe MR), CKD, DM2, HTN, HLD admitted on 11/10 with symptoms likely 2/2 valvular HF 2/2 severe MR.     - clinically stable with improving SOB  - MARBIN on 11/16 confirming primary MR, mod-severe AI, severe pHTN, severe LAE  - c/w aspirin, plavix, lasix, nifedipine, hydral, metop, statin  - c/w medical management and assess volume status, strict i/o's  - trend cr to determine whether pt can get cardiac loi today, consent form done today am  - structural heart to follow-up outpt for faraz Brown, #[id number]  Cardiology fellow

## 2018-11-20 NOTE — PROGRESS NOTE ADULT - SUBJECTIVE AND OBJECTIVE BOX
Patient is a 83y old  Male who presents with a chief complaint of respiratory distress, SOB, chest pain (20 Nov 2018 11:12)      SUBJECTIVE / OVERNIGHT EVENTS:  Feels tired. denies any SOB, CP. right ankle pain improved.  afebrile.  no acute issues overnight.,     MEDICATIONS  (STANDING):  aspirin enteric coated 81 milliGRAM(s) Oral daily  buDESOnide 160 MICROgram(s)/formoterol 4.5 MICROgram(s) Inhaler 2 Puff(s) Inhalation two times a day  clopidogrel Tablet 75 milliGRAM(s) Oral daily  dextrose 5%. 1000 milliLiter(s) (50 mL/Hr) IV Continuous <Continuous>  dextrose 50% Injectable 12.5 Gram(s) IV Push once  docusate sodium 100 milliGRAM(s) Oral three times a day  doxazosin 2 milliGRAM(s) Oral at bedtime  heparin  Injectable 5000 Unit(s) SubCutaneous every 8 hours  influenza   Vaccine 0.5 milliLiter(s) IntraMuscular once  insulin glargine Injectable (LANTUS) 5 Unit(s) SubCutaneous at bedtime  insulin lispro (HumaLOG) corrective regimen sliding scale   SubCutaneous three times a day before meals  insulin lispro (HumaLOG) corrective regimen sliding scale   SubCutaneous at bedtime  metoprolol tartrate 50 milliGRAM(s) Oral two times a day  NIFEdipine XL 60 milliGRAM(s) Oral <User Schedule>  pantoprazole    Tablet 40 milliGRAM(s) Oral before breakfast  predniSONE   Tablet 30 milliGRAM(s) Oral daily  senna 2 Tablet(s) Oral at bedtime  sertraline 200 milliGRAM(s) Oral daily  simvastatin 20 milliGRAM(s) Oral at bedtime    MEDICATIONS  (PRN):  acetaminophen   Tablet .. 650 milliGRAM(s) Oral every 6 hours PRN Temp greater or equal to 38C (100.4F)  dextrose 40% Gel 15 Gram(s) Oral once PRN Blood Glucose LESS THAN 70 milliGRAM(s)/deciliter  hydrALAZINE 100 milliGRAM(s) Oral every 8 hours PRN Systolic blood pressure > 170  oxyCODONE    IR 5 milliGRAM(s) Oral every 4 hours PRN Severe Pain (7 - 10)      Vital Signs Last 24 Hrs  T(C): 36.6 (20 Nov 2018 11:13), Max: 36.6 (19 Nov 2018 21:15)  T(F): 97.8 (20 Nov 2018 11:13), Max: 97.9 (19 Nov 2018 21:15)  HR: 69 (20 Nov 2018 11:13) (63 - 72)  BP: 156/70 (20 Nov 2018 11:13) (107/52 - 175/71)  BP(mean): --  RR: 18 (20 Nov 2018 11:13) (18 - 18)  SpO2: 97% (20 Nov 2018 11:13) (96% - 100%)  CAPILLARY BLOOD GLUCOSE      POCT Blood Glucose.: 314 mg/dL (20 Nov 2018 12:01)  POCT Blood Glucose.: 193 mg/dL (20 Nov 2018 07:39)  POCT Blood Glucose.: 149 mg/dL (19 Nov 2018 21:31)  POCT Blood Glucose.: 157 mg/dL (19 Nov 2018 16:31)    I&O's Summary    19 Nov 2018 07:01  -  20 Nov 2018 07:00  --------------------------------------------------------  IN: 840 mL / OUT: 1875 mL / NET: -1035 mL    20 Nov 2018 07:01  -  20 Nov 2018 12:58  --------------------------------------------------------  IN: 360 mL / OUT: 0 mL / NET: 360 mL          PHYSICAL EXAM  GENERAL: NAD, well-developed  HEAD:  Atraumatic, Normocephalic  EYES: EOMI, conjunctiva and sclera clear  NECK: Supple, No JVD  CHEST/LUNG: CTA .; No wheeze  HEART: Regular rate and rhythm; systolic murmur  ABDOMEN: Soft, Nontender, Nondistended; Bowel sounds present  EXTREMITIES:  No clubbing, cyanosis, or edema. right ankle without erythema or swelling. nontender  PSYCH: AAOx3  SKIN: No rashes or lesions    LABS:                        8.3    4.69  )-----------( 168      ( 20 Nov 2018 09:11 )             26.7     11-20    143  |  104  |  62<H>  ----------------------------<  207<H>  3.5   |  28  |  2.32<H>    Ca    9.4      20 Nov 2018 06:21  Phos  3.6     11-19  Mg     2.6     11-20    TPro  x   /  Alb  3.4  /  TBili  x   /  DBili  x   /  AST  x   /  ALT  x   /  AlkPhos  x   11-19                RADIOLOGY & ADDITIONAL TESTS:    Imaging Personally Reviewed:  Consultant(s) Notes Reviewed:    Care Discussed with Consultants/Other Providers:

## 2018-12-02 ENCOUNTER — INPATIENT (INPATIENT)
Facility: HOSPITAL | Age: 83
LOS: 8 days | Discharge: HOME | DRG: 291 | End: 2018-12-11
Attending: HOSPITALIST | Admitting: HOSPITALIST
Payer: MEDICARE

## 2018-12-02 VITALS
SYSTOLIC BLOOD PRESSURE: 129 MMHG | TEMPERATURE: 98 F | HEART RATE: 82 BPM | RESPIRATION RATE: 23 BRPM | OXYGEN SATURATION: 91 % | DIASTOLIC BLOOD PRESSURE: 52 MMHG

## 2018-12-02 DIAGNOSIS — M54.9 DORSALGIA, UNSPECIFIED: ICD-10-CM

## 2018-12-02 DIAGNOSIS — J81.1 CHRONIC PULMONARY EDEMA: ICD-10-CM

## 2018-12-02 DIAGNOSIS — N18.4 CHRONIC KIDNEY DISEASE, STAGE 4 (SEVERE): ICD-10-CM

## 2018-12-02 DIAGNOSIS — I25.10 ATHEROSCLEROTIC HEART DISEASE OF NATIVE CORONARY ARTERY WITHOUT ANGINA PECTORIS: Chronic | ICD-10-CM

## 2018-12-02 DIAGNOSIS — Z71.89 OTHER SPECIFIED COUNSELING: ICD-10-CM

## 2018-12-02 DIAGNOSIS — Z29.9 ENCOUNTER FOR PROPHYLACTIC MEASURES, UNSPECIFIED: ICD-10-CM

## 2018-12-02 DIAGNOSIS — I10 ESSENTIAL (PRIMARY) HYPERTENSION: ICD-10-CM

## 2018-12-02 DIAGNOSIS — J44.9 CHRONIC OBSTRUCTIVE PULMONARY DISEASE, UNSPECIFIED: ICD-10-CM

## 2018-12-02 DIAGNOSIS — M10.9 GOUT, UNSPECIFIED: ICD-10-CM

## 2018-12-02 DIAGNOSIS — J96.01 ACUTE RESPIRATORY FAILURE WITH HYPOXIA: ICD-10-CM

## 2018-12-02 DIAGNOSIS — I27.20 PULMONARY HYPERTENSION, UNSPECIFIED: ICD-10-CM

## 2018-12-02 DIAGNOSIS — E11.9 TYPE 2 DIABETES MELLITUS WITHOUT COMPLICATIONS: ICD-10-CM

## 2018-12-02 DIAGNOSIS — I50.30 UNSPECIFIED DIASTOLIC (CONGESTIVE) HEART FAILURE: ICD-10-CM

## 2018-12-02 PROBLEM — E78.5 HYPERLIPIDEMIA, UNSPECIFIED: Chronic | Status: ACTIVE | Noted: 2018-11-09

## 2018-12-02 LAB
ALBUMIN SERPL ELPH-MCNC: 3.6 G/DL — SIGNIFICANT CHANGE UP (ref 3.3–5)
ALP SERPL-CCNC: 49 U/L — SIGNIFICANT CHANGE UP (ref 40–120)
ALT FLD-CCNC: 7 U/L — LOW (ref 10–45)
ANION GAP SERPL CALC-SCNC: 17 MMOL/L — SIGNIFICANT CHANGE UP (ref 5–17)
APPEARANCE UR: CLEAR — SIGNIFICANT CHANGE UP
APTT BLD: 25 SEC — LOW (ref 27.5–36.3)
AST SERPL-CCNC: 16 U/L — SIGNIFICANT CHANGE UP (ref 10–40)
BASE EXCESS BLDV CALC-SCNC: -0.2 MMOL/L — SIGNIFICANT CHANGE UP (ref -2–2)
BASOPHILS # BLD AUTO: 0 K/UL — SIGNIFICANT CHANGE UP (ref 0–0.2)
BASOPHILS NFR BLD AUTO: 0.3 % — SIGNIFICANT CHANGE UP (ref 0–2)
BILIRUB SERPL-MCNC: 0.3 MG/DL — SIGNIFICANT CHANGE UP (ref 0.2–1.2)
BILIRUB UR-MCNC: NEGATIVE — SIGNIFICANT CHANGE UP
BUN SERPL-MCNC: 49 MG/DL — HIGH (ref 7–23)
CA-I SERPL-SCNC: 1.24 MMOL/L — SIGNIFICANT CHANGE UP (ref 1.12–1.3)
CALCIUM SERPL-MCNC: 9.4 MG/DL — SIGNIFICANT CHANGE UP (ref 8.4–10.5)
CHLORIDE BLDV-SCNC: 107 MMOL/L — SIGNIFICANT CHANGE UP (ref 96–108)
CHLORIDE SERPL-SCNC: 104 MMOL/L — SIGNIFICANT CHANGE UP (ref 96–108)
CO2 BLDV-SCNC: 25 MMOL/L — SIGNIFICANT CHANGE UP (ref 22–30)
CO2 SERPL-SCNC: 20 MMOL/L — LOW (ref 22–31)
COLOR SPEC: COLORLESS — SIGNIFICANT CHANGE UP
CREAT SERPL-MCNC: 2.16 MG/DL — HIGH (ref 0.5–1.3)
DIFF PNL FLD: NEGATIVE — SIGNIFICANT CHANGE UP
EOSINOPHIL # BLD AUTO: 0.1 K/UL — SIGNIFICANT CHANGE UP (ref 0–0.5)
EOSINOPHIL NFR BLD AUTO: 1.2 % — SIGNIFICANT CHANGE UP (ref 0–6)
GAS PNL BLDV: 139 MMOL/L — SIGNIFICANT CHANGE UP (ref 136–145)
GAS PNL BLDV: SIGNIFICANT CHANGE UP
GAS PNL BLDV: SIGNIFICANT CHANGE UP
GLUCOSE BLDV-MCNC: 122 MG/DL — HIGH (ref 70–99)
GLUCOSE SERPL-MCNC: 132 MG/DL — HIGH (ref 70–99)
GLUCOSE UR QL: NEGATIVE — SIGNIFICANT CHANGE UP
HCO3 BLDV-SCNC: 24 MMOL/L — SIGNIFICANT CHANGE UP (ref 21–29)
HCT VFR BLD CALC: 28.6 % — LOW (ref 39–50)
HCT VFR BLDA CALC: 28 % — LOW (ref 39–50)
HGB BLD CALC-MCNC: 9.1 G/DL — LOW (ref 13–17)
HGB BLD-MCNC: 9.4 G/DL — LOW (ref 13–17)
INR BLD: 1.17 RATIO — HIGH (ref 0.88–1.16)
KETONES UR-MCNC: NEGATIVE — SIGNIFICANT CHANGE UP
LACTATE BLDV-MCNC: 1.3 MMOL/L — SIGNIFICANT CHANGE UP (ref 0.7–2)
LEUKOCYTE ESTERASE UR-ACNC: NEGATIVE — SIGNIFICANT CHANGE UP
LYMPHOCYTES # BLD AUTO: 0.4 K/UL — LOW (ref 1–3.3)
LYMPHOCYTES # BLD AUTO: 7.3 % — LOW (ref 13–44)
MCHC RBC-ENTMCNC: 29.3 PG — SIGNIFICANT CHANGE UP (ref 27–34)
MCHC RBC-ENTMCNC: 33 GM/DL — SIGNIFICANT CHANGE UP (ref 32–36)
MCV RBC AUTO: 88.7 FL — SIGNIFICANT CHANGE UP (ref 80–100)
MONOCYTES # BLD AUTO: 0.4 K/UL — SIGNIFICANT CHANGE UP (ref 0–0.9)
MONOCYTES NFR BLD AUTO: 6.5 % — SIGNIFICANT CHANGE UP (ref 2–14)
NEUTROPHILS # BLD AUTO: 5.1 K/UL — SIGNIFICANT CHANGE UP (ref 1.8–7.4)
NEUTROPHILS NFR BLD AUTO: 84.7 % — HIGH (ref 43–77)
NITRITE UR-MCNC: NEGATIVE — SIGNIFICANT CHANGE UP
NT-PROBNP SERPL-SCNC: 3765 PG/ML — HIGH (ref 0–300)
PCO2 BLDV: 40 MMHG — SIGNIFICANT CHANGE UP (ref 35–50)
PH BLDV: 7.4 — SIGNIFICANT CHANGE UP (ref 7.35–7.45)
PH UR: 6 — SIGNIFICANT CHANGE UP (ref 5–8)
PLATELET # BLD AUTO: 138 K/UL — LOW (ref 150–400)
PO2 BLDV: 84 MMHG — HIGH (ref 25–45)
POTASSIUM BLDV-SCNC: 4.1 MMOL/L — SIGNIFICANT CHANGE UP (ref 3.5–5.3)
POTASSIUM SERPL-MCNC: 4.5 MMOL/L — SIGNIFICANT CHANGE UP (ref 3.5–5.3)
POTASSIUM SERPL-SCNC: 4.5 MMOL/L — SIGNIFICANT CHANGE UP (ref 3.5–5.3)
PROT SERPL-MCNC: 7 G/DL — SIGNIFICANT CHANGE UP (ref 6–8.3)
PROT UR-MCNC: NEGATIVE — SIGNIFICANT CHANGE UP
PROTHROM AB SERPL-ACNC: 13.5 SEC — HIGH (ref 10–12.9)
RAPID RVP RESULT: SIGNIFICANT CHANGE UP
RBC # BLD: 3.22 M/UL — LOW (ref 4.2–5.8)
RBC # FLD: 14.9 % — HIGH (ref 10.3–14.5)
SAO2 % BLDV: 97 % — HIGH (ref 67–88)
SODIUM SERPL-SCNC: 141 MMOL/L — SIGNIFICANT CHANGE UP (ref 135–145)
SP GR SPEC: 1.01 — LOW (ref 1.01–1.02)
TROPONIN T, HIGH SENSITIVITY RESULT: 35 NG/L — SIGNIFICANT CHANGE UP (ref 0–51)
TROPONIN T, HIGH SENSITIVITY RESULT: 36 NG/L — SIGNIFICANT CHANGE UP (ref 0–51)
UROBILINOGEN FLD QL: NEGATIVE — SIGNIFICANT CHANGE UP
WBC # BLD: 6 K/UL — SIGNIFICANT CHANGE UP (ref 3.8–10.5)
WBC # FLD AUTO: 6 K/UL — SIGNIFICANT CHANGE UP (ref 3.8–10.5)

## 2018-12-02 PROCEDURE — 99285 EMERGENCY DEPT VISIT HI MDM: CPT | Mod: 25

## 2018-12-02 PROCEDURE — 99223 1ST HOSP IP/OBS HIGH 75: CPT | Mod: GC

## 2018-12-02 PROCEDURE — 71045 X-RAY EXAM CHEST 1 VIEW: CPT | Mod: 26

## 2018-12-02 PROCEDURE — 93010 ELECTROCARDIOGRAM REPORT: CPT

## 2018-12-02 RX ORDER — FUROSEMIDE 40 MG
80 TABLET ORAL ONCE
Qty: 0 | Refills: 0 | Status: COMPLETED | OUTPATIENT
Start: 2018-12-02 | End: 2018-12-02

## 2018-12-02 RX ORDER — ASPIRIN/CALCIUM CARB/MAGNESIUM 324 MG
81 TABLET ORAL DAILY
Qty: 0 | Refills: 0 | Status: DISCONTINUED | OUTPATIENT
Start: 2018-12-02 | End: 2018-12-11

## 2018-12-02 RX ORDER — FUROSEMIDE 40 MG
40 TABLET ORAL EVERY 12 HOURS
Qty: 0 | Refills: 0 | Status: DISCONTINUED | OUTPATIENT
Start: 2018-12-03 | End: 2018-12-10

## 2018-12-02 RX ORDER — BUDESONIDE AND FORMOTEROL FUMARATE DIHYDRATE 160; 4.5 UG/1; UG/1
2 AEROSOL RESPIRATORY (INHALATION)
Qty: 0 | Refills: 0 | Status: DISCONTINUED | OUTPATIENT
Start: 2018-12-02 | End: 2018-12-11

## 2018-12-02 RX ORDER — CLOPIDOGREL BISULFATE 75 MG/1
75 TABLET, FILM COATED ORAL DAILY
Qty: 0 | Refills: 0 | Status: DISCONTINUED | OUTPATIENT
Start: 2018-12-02 | End: 2018-12-11

## 2018-12-02 RX ORDER — ACETAMINOPHEN 500 MG
650 TABLET ORAL ONCE
Qty: 0 | Refills: 0 | Status: COMPLETED | OUTPATIENT
Start: 2018-12-02 | End: 2018-12-02

## 2018-12-02 RX ORDER — HYDRALAZINE HCL 50 MG
50 TABLET ORAL
Qty: 0 | Refills: 0 | Status: DISCONTINUED | OUTPATIENT
Start: 2018-12-02 | End: 2018-12-06

## 2018-12-02 RX ORDER — SERTRALINE 25 MG/1
100 TABLET, FILM COATED ORAL DAILY
Qty: 0 | Refills: 0 | Status: DISCONTINUED | OUTPATIENT
Start: 2018-12-02 | End: 2018-12-11

## 2018-12-02 RX ORDER — DOXAZOSIN MESYLATE 4 MG
2 TABLET ORAL AT BEDTIME
Qty: 0 | Refills: 0 | Status: DISCONTINUED | OUTPATIENT
Start: 2018-12-02 | End: 2018-12-11

## 2018-12-02 RX ORDER — HEPARIN SODIUM 5000 [USP'U]/ML
5000 INJECTION INTRAVENOUS; SUBCUTANEOUS EVERY 12 HOURS
Qty: 0 | Refills: 0 | Status: DISCONTINUED | OUTPATIENT
Start: 2018-12-02 | End: 2018-12-11

## 2018-12-02 RX ORDER — NIFEDIPINE 30 MG
60 TABLET, EXTENDED RELEASE 24 HR ORAL
Qty: 0 | Refills: 0 | Status: DISCONTINUED | OUTPATIENT
Start: 2018-12-02 | End: 2018-12-06

## 2018-12-02 RX ORDER — INSULIN GLARGINE 100 [IU]/ML
5 INJECTION, SOLUTION SUBCUTANEOUS AT BEDTIME
Qty: 0 | Refills: 0 | Status: DISCONTINUED | OUTPATIENT
Start: 2018-12-02 | End: 2018-12-11

## 2018-12-02 RX ORDER — SIMVASTATIN 20 MG/1
20 TABLET, FILM COATED ORAL AT BEDTIME
Qty: 0 | Refills: 0 | Status: DISCONTINUED | OUTPATIENT
Start: 2018-12-02 | End: 2018-12-11

## 2018-12-02 RX ORDER — SENNA PLUS 8.6 MG/1
2 TABLET ORAL AT BEDTIME
Qty: 0 | Refills: 0 | Status: DISCONTINUED | OUTPATIENT
Start: 2018-12-02 | End: 2018-12-11

## 2018-12-02 RX ORDER — PANTOPRAZOLE SODIUM 20 MG/1
40 TABLET, DELAYED RELEASE ORAL
Qty: 0 | Refills: 0 | Status: DISCONTINUED | OUTPATIENT
Start: 2018-12-02 | End: 2018-12-11

## 2018-12-02 RX ORDER — METOPROLOL TARTRATE 50 MG
50 TABLET ORAL
Qty: 0 | Refills: 0 | Status: DISCONTINUED | OUTPATIENT
Start: 2018-12-02 | End: 2018-12-11

## 2018-12-02 RX ADMIN — Medication 80 MILLIGRAM(S): at 16:52

## 2018-12-02 RX ADMIN — SENNA PLUS 2 TABLET(S): 8.6 TABLET ORAL at 22:50

## 2018-12-02 RX ADMIN — Medication 650 MILLIGRAM(S): at 19:35

## 2018-12-02 RX ADMIN — Medication 2 MILLIGRAM(S): at 22:50

## 2018-12-02 RX ADMIN — INSULIN GLARGINE 5 UNIT(S): 100 INJECTION, SOLUTION SUBCUTANEOUS at 22:50

## 2018-12-02 RX ADMIN — SIMVASTATIN 20 MILLIGRAM(S): 20 TABLET, FILM COATED ORAL at 22:50

## 2018-12-02 NOTE — H&P ADULT - NSHPREVIEWOFSYSTEMS_GEN_ALL_CORE
REVIEW OF SYSTEMS:    CONSTITUTIONAL: No weakness, fevers or chills  EYES/ENT: No visual changes;  No vertigo or throat pain   NECK: No pain or stiffness  RESPIRATORY: No cough, wheezing, hemoptysis; + shortness of breath  CARDIOVASCULAR: No chest pain or palpitations  GASTROINTESTINAL: No abdominal pain; nausea, vomiting;  diarrhea or constipation. No hemetemesis, melena or hematochezia.  GENITOURINARY: No dysuria, frequency or hematuria  NEUROLOGICAL: No numbness or weakness  SKIN: No itching, burning, rashes, or lesions   All other review of systems is negative unless indicated above.

## 2018-12-02 NOTE — H&P ADULT - PMH
Heart failure with preserved ejection fraction    HLD (hyperlipidemia)    HTN (hypertension)    T2DM (type 2 diabetes mellitus)

## 2018-12-02 NOTE — ED PROVIDER NOTE - MEDICAL DECISION MAKING DETAILS
83m extensive pmh p/w sob. Likely pulm edema 2/2 chf ?valvulopathy. Unlikely PE vs acs vs ptx vs pna. Will get labs, imaging, likely a/m 83m extensive pmh p/w sob. Likely pulm edema 2/2 chf ?valvulopathy. Unlikely PE vs acs vs ptx vs pna. Will get labs, imaging, likely a/m  Diane: See attending statement below

## 2018-12-02 NOTE — H&P ADULT - PROBLEM SELECTOR PLAN 7
Cr 2.15 on admission, appears improved from prior admission.   - avoid nephrotoxins  - dose meds for GFR 20s  - currently electrolytes, BUN, serum pH acceptable  - trend BMP in light of ditrishaes

## 2018-12-02 NOTE — ED PROVIDER NOTE - ATTENDING CONTRIBUTION TO CARE
83y M hx of CHF, HTN, DM here with c/o progressively worsening SOB and BL LE swelling. Pt recently admitted 11/10 thru 11/20 for CHF exac. Pt states that he was DC, not on Lasix but his family restarted Lasix 80mg 1 week ago due to recurrence of sx. 83y M hx of CHF, CAD on ASA/Plavix w 6 stents, recent cath w/o obstructive lesions, HTN, DM here with c/o progressively worsening SOB and BL LE swelling, inabilityt to lay flat. Pt recently admitted 11/10 thru 11/20 for CHF exac and "leaky valve". Pt states that he was DC, not on Lasix but his family restarted Lasix 80mg 1 week ago due to recurrence of sx. Not on home oxygen. Seen by cardio Dr Varun Zapata other day who agreed w restart lasix. No fevers. +Nasal congestion. Dry cough. No CP, palp.   Gen: WNWD mild tachypnea   HEENT: NCAT PERRL EOMI normal pharynx  Neck: supple no JVD   CV: RRR, murmur  Lung: crackles BL bases   Abd: +BS soft NTND  Ext: wwp, palp pulses, FROMx4, pitting edema to just below knee BL   Neuro: A&ox3, CN grossly intact  AP: 83y M hx of CHF, HTN, DM here with c/o progressively worsening SOB and BL LE swelling despite restarting lasix at home. Suspect CHf exac, +/- underlying viral component. Check labs, CXR, EKG, dose IV lasix now. 83y M hx of CHF, CAD on ASA/Plavix w 6 stents, recent cath w/o obstructive lesions, HTN, DM here with c/o progressively worsening SOB and BL LE swelling, inabilityt to lay flat. Pt recently admitted 11/10 thru 11/20 for CHF exac and "leaky valve". Pt states that he was DC, not on Lasix but his family restarted Lasix 80mg 1 week ago due to recurrence of sx. Not on home oxygen. Seen by cardio Dr Varun Zapata other day who agreed w restart lasix. No fevers. +Nasal congestion. Dry cough. No CP, palp.   Gen: WNWD mild tachypnea   HEENT: NCAT PERRL EOMI normal pharynx  Neck: supple no JVD   CV: RRR, murmur  Lung: crackles BL bases   Abd: +BS soft NTND  Ext: wwp, palp pulses, FROMx4, pitting edema to just below knee BL   Neuro: A&ox3, CN grossly intact  AP: 83y M hx of CHF, HTN, DM here with c/o progressively worsening SOB and BL LE swelling despite restarting lasix at home. Suspect CHf exac, valvular dz, +/- underlying viral component. Check labs, CXR, EKG, dose IV lasix now.

## 2018-12-02 NOTE — H&P ADULT - PROBLEM SELECTOR PLAN 1
Patient presenting with worsening dypnea, found to have pulm edema in the setting known severe pulm HTN with HFpEF, mitral and valvular disease. No evidence of cardiac ischemia Low suspicion for infection or COPD exacerbation  - currently saturating well on 2-3 L NC  - maintain SpO2 > 92  - s/p lasix 80 x 1 in ED  - monitor I.O  - monitor on tele  - Mg > 2 and K > 4 and phos > 3  - f.u cardiology in AM Patient presenting with worsening dypnea, found to have pulm edema in the setting known severe pulm HTN with HFpEF, mitral and valvular disease. No evidence of cardiac ischemia Low suspicion for infection or COPD exacerbation  - currently saturating well on 2-3 L NC,  - maintain SpO2 > 92  - s/p lasix 80 x 1 in ED  - c/w lasix 40 IV BID  - monitor I.O  - monitor on tele  - Mg > 2 and K > 4 and phos > 3  - f.u cardiology, cardiothoracic surgery in AM Patient presenting with worsening dypnea, found to have pulm edema in the setting known severe pulm HTN with HFpEF, mitral and valvular disease. Patient was not discharged on lasix after recent admission.  No evidence of cardiac ischemia Low suspicion for infection or COPD exacerbation  - currently saturating well on 2-3 L NC,  - maintain SpO2 > 92  - s/p lasix 80 x 1 in ED  - c/w lasix 40 IV BID  - monitor I.O  - monitor on tele  - Mg > 2 and K > 4 and phos > 3  - f.u cardiology, cardiothoracic surgery in AM

## 2018-12-02 NOTE — H&P ADULT - NSHPPHYSICALEXAM_GEN_ALL_CORE
PHYSICAL EXAM:    Vital Signs Last 24 Hrs  T(C): 36.9 (02 Dec 2018 19:36), Max: 37.4 (02 Dec 2018 15:34)  T(F): 98.4 (02 Dec 2018 19:36), Max: 99.3 (02 Dec 2018 15:34)  HR: 73 (02 Dec 2018 19:36) (73 - 82)  BP: 151/52 (02 Dec 2018 19:36) (129/52 - 154/57)  BP(mean): --  RR: 17 (02 Dec 2018 19:36) (17 - 23)  SpO2: 98% (02 Dec 2018 19:36) (90% - 98%)    General: No acute distress.   HEENT: NCAT.  PERRL.  EOMI.  No scleral icterus or injection.  Moist MM.    Neck: Supple.  Full ROM.  + JVD.  No lymphadenopathy.   Heart: RRR.  Normal S1 and S2.  Systolic murmur at apex.   Lungs: Diffuse crackles throughout no wheeze  Abdomen: BS+, soft, NT/ND.  No organomegaly. + mild ecchymosis from prior heparin injection  Skin: Warm and dry.  No rashes.  Extremities: 3+ bilateral pitting edema R > L .  2+ peripheral pulses b/l.  Musculoskeletal: No deformities.  No spinal or paraspinal tenderness.  Neuro: A&Ox3.  CN II-XII intact.  5/5 strength in UE and LE b/l.  Tactile sensation intact in UE and LE b/l.  Cerebellar function intact PHYSICAL EXAM:    Vital Signs Last 24 Hrs  T(C): 36.9 (02 Dec 2018 19:36), Max: 37.4 (02 Dec 2018 15:34)  T(F): 98.4 (02 Dec 2018 19:36), Max: 99.3 (02 Dec 2018 15:34)  HR: 73 (02 Dec 2018 19:36) (73 - 82)  BP: 151/52 (02 Dec 2018 19:36) (129/52 - 154/57)  BP(mean): --  RR: 17 (02 Dec 2018 19:36) (17 - 23)  SpO2: 98% (02 Dec 2018 19:36) (90% - 98%)    General: Elderly male resting resting, mildlyNo acute distress.   HEENT: NCAT.  PERRL.  EOMI.  No scleral icterus or injection.  Moist MM.    Neck: Supple.  Full ROM.  + JVD.  No lymphadenopathy.   Heart: RRR.  Normal S1 and S2.  Systolic murmur at apex.   Lungs: Diffuse crackles throughout no wheeze  Abdomen: BS+, soft, NT/ND.  No organomegaly. + mild ecchymosis from prior heparin injection  Skin: Warm and dry.  No rashes.  Extremities: 3+ bilateral pitting edema R > L; faizan sign positive on R   2+ peripheral pulses b/l.  Musculoskeletal: No deformities.  No spinal or paraspinal tenderness.  Neuro: A&Ox3.  CN II-XII intact.  5/5 strength in UE and LE b/l.

## 2018-12-02 NOTE — H&P ADULT - PROBLEM SELECTOR PLAN 6
Hx of T2DM. Hemoglobin A1C 4.7, overly controlled given age  - FSG and ISS qAC and qHS  - c/w lantus 5 units, titrate down as tolerated Hx of T2DM. Hemoglobin A1C 4.7 11/2018, overly controlled given age  - appears during prior admission to have steroid induced hyperglycemia  - FSG and ISS qAC and qHS  - c/w lantus 5 units, titrate down as tolerated

## 2018-12-02 NOTE — ED ADULT NURSE NOTE - OBJECTIVE STATEMENT
82 yo presents to the ED from home with family at bedside. A&Ox4 c/o SOB. pt recently DC from [facility] 11 days ago. pt has history of CHF, on Lasix at home. reports SOB, even at rest. not on home O2 but was on O2 while in [facility], reports feeling better with O2. pt is 90% on RA but with 3L NC pt 97%. pt is afebrile, 99.3 rectal. BC sent x 2. 20G in R upper arm. VSS, other than O2 sat. pt denies CP, EKG done at bedside. pt placed on CM. no recent travel. no sick contact. increase swelling in legs but "better than it was when he was here last time", as per family. family very active is pt care. pt lives at home with family. able to walk "when he wants to" as per family. on Plavix. type 2 DM, on insulin.

## 2018-12-02 NOTE — ED PROVIDER NOTE - OBJECTIVE STATEMENT
Dr. Contreras:  83y M here with 83M, trilingual Pakistani/Maltese speaking, c hx CAD s/p 6 stents (last stent 6 years ago), CHF, CKD (unknown baseline), DM2, HTN, HLD, chronic R lower back pain and chronic suprapubic pain, former smoker, pw hypoxic resp failure 2/2 pulm edema likely 2/2 CHF exacerbation with severe MR, severe AR, mild AS recently admitted 83M, trilingual Moldovan/Turkmen speaking, c hx CAD s/p 6 stents (last stent 6 years ago), CHF, CKD (unknown baseline), DM2, HTN, HLD, chronic R lower back pain and chronic suprapubic pain, former smoker, pw hypoxic resp failure 2/2 pulm edema likely 2/2 CHF exacerbation with severe MR, severe AR, mild AS recently admitted for pulm edema p/w sob. HAs had sob since discharge last week. Worsening today. +orthopnea, lower extremity swelling. No cp, fevers/chills, cough, recent travel/procedures, no other complaints.

## 2018-12-02 NOTE — H&P ADULT - PROBLEM SELECTOR PLAN 2
Patient with severe pulm HTN likely group 2 given diastolic dysfunction as well as significant mitral and aortic insufficient as well as possible group 3 from known COPD. 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

## 2018-12-02 NOTE — H&P ADULT - HISTORY OF PRESENT ILLNESS
83M with PMH HTN, HLD, T2DM, CAD s/p 6 stents (last stent 6 years ago), HFpEF (EF 75%, and CKD stage 4, presents with shortness of breath for ___ days.     He endorses dyspnea on exertion with minimal ambulation, orthopnea, and worsening lower extremity edema.   He denies fevers, chills, chest pain, productive cough, diarrhea or dysuria. No sick contacts. Patient was recently discharged from [facility] for similar symptoms on 11/20/2018 during which inpatient workup of heart failure exacerbation revealed: cardiac catheterization without hemodynamically significant stenosis; MARBIN showed severe mitral regurg with torn chordae, moderate-severe aortic regurgitation and severe pulmonary hypertension. He was medically optimized and evaluated by CT surgery for valve repair which was deferred as patient clinically improved. Patient was due to       In ED, T 98.2, HR 82, BP: 129/52, and 91 % on RA. Labs notable for 9.4 (baseline 8-9s), Cr 2.16 (improved 2.32 on 11/2018), proBNP 3000, and lactate 1.3. Troponin 36. RVP neg. CXR with hilar congestion 83M with PMH HTN, HLD, T2DM, CAD s/p 6 stents (last stent 6 years ago), HFpEF (EF 75%, and CKD stage 4, presents with shortness of breath for 4 days. He endorses dyspnea on exertion with minimal ambulation, orthopnea, and worsening lower extremity edema. He has chronic right sided chest pain which radiated to the arm. He denies fevers, chills, productive cough, diarrhea or dysuria. No sick contacts. Patient was recently discharged from [facility] for similar symptoms on 11/20/2018 during which inpatient workup of heart failure exacerbation revealed: cardiac catheterization without hemodynamically significant stenosis; MARBIN showed severe mitral regurg with torn chordae, moderate-severe aortic regurgitation and severe pulmonary hypertension. He was medically optimized and evaluated by CT surgery for valve repair which was deferred as patient clinically improved. Patient was due to     In ED, T 98.2, HR 82, BP: 129/52, and 91 % on RA. Labs notable for 9.4 (baseline 8-9s), Cr 2.16 (improved 2.32 on 11/2018), proBNP 3000, and lactate 1.3. Troponin 36. RVP neg. CXR with hilar congestion. He was given lasix 80 x 1 83M with PMH HTN, HLD, T2DM, CAD s/p 6 stents (last stent 6 years ago), HFpEF (EF 75%, and CKD stage 4, presents with shortness of breath for 4 days. He endorses dyspnea on exertion with minimal ambulation, orthopnea, and worsening lower extremity edema. He has chronic right sided chest pain which radiated to the arm. He denies fevers, chills, productive cough, diarrhea or dysuria. No sick contacts. Patient was recently discharged from [facility] for similar symptoms on 11/20/2018 during which inpatient workup of heart failure exacerbation revealed: cardiac catheterization without hemodynamically significant stenosis; MARBIN showed severe mitral regurg with torn chordae, moderate-severe aortic regurgitation and severe pulmonary hypertension. He was medically optimized and evaluated by CT surgery for valve repair which was deferred as patient clinically improved.    In ED, T 98.2, HR 82, BP: 129/52, and 91 % on RA. Labs notable for 9.4 (baseline 8-9s), Cr 2.16 (improved 2.32 on 11/2018), proBNP 3000, and lactate 1.3. Troponin 36. RVP neg. CXR with hilar congestion. He was given lasix 80 x 1

## 2018-12-02 NOTE — ED ADULT NURSE REASSESSMENT NOTE - NS ED NURSE REASSESS COMMENT FT1
Pt a/ox3 complaining of general discomfort from "sitting in bed". Awaiting admission. Vitals stable, will continue to reassess.

## 2018-12-02 NOTE — H&P ADULT - PROBLEM SELECTOR PLAN 4
Statement Selected On nifedipine 120 mg qd and hydralazine 125 mg TID?   - BP currently acceptable  - c/w nifedipine 60 mg  - c/w hydralazine 100 mg TID Gout flare during last admission on steroid taper. Taper to be complete on 12/3 with prednisone 5 mg  - one dose 5 mg prednisone tomorrow

## 2018-12-02 NOTE — H&P ADULT - NSHPSOCIALHISTORY_GEN_ALL_CORE
Former heavy smoker, quit 2000  Social alcohol use < 3 drinks per week  [place] , retired  Ambulates with walker  Lives with daughter as wife passed away Former heavy smoker, quit 2000  Social alcohol use < 3 drinks per week  Former [place] , retired  Ambulates with walker  Lives with daughter as wife passed away

## 2018-12-02 NOTE — H&P ADULT - ASSESSMENT
83M with PMH HTN, HLD, T2DM, CAD s/p 6 stents (last stent 6 years ago), HFpEF (EF 75%, and CKD stage 4, presents with shortness of breath for ___ days, found to have pulmonary edema concerning for decompensated heart failure 83M with PMH HTN, HLD, T2DM, CAD s/p 6 stents (last stent 6 years ago), HFpEF (EF 75%, and CKD stage 4, presents with shortness of breath for 4 days, found to have pulmonary edema concerning for decompensated heart failure in setting of mitral valve insufficiency

## 2018-12-02 NOTE — H&P ADULT - PROBLEM SELECTOR PLAN 9
DVT: heparin SQ  DIet: DASH TLC carb consistent Patient endorsing he would NOT want CPR or mechanical ventilation. He feels he had lived a good life and overal quality of life has somewhat diminished with his comorbid conditions. Patient endorsing he would NOT want CPR or mechanical ventilation. He feels he had lived a good life and overall quality of life has somewhat diminished with his comorbid conditions.

## 2018-12-02 NOTE — H&P ADULT - NSHPLABSRESULTS_GEN_ALL_CORE
CBC Full  -  ( 02 Dec 2018 16:42 )  WBC Count : 6.0 K/uL  Hemoglobin : 9.4 g/dL  Hematocrit : 28.6 %  Platelet Count - Automated : 138 K/uL  Mean Cell Volume : 88.7 fl  Mean Cell Hemoglobin : 29.3 pg  Mean Cell Hemoglobin Concentration : 33.0 gm/dL  Auto Neutrophil # : 5.1 K/uL  Auto Lymphocyte # : 0.4 K/uL  Auto Monocyte # : 0.4 K/uL  Auto Eosinophil # : 0.1 K/uL  Auto Basophil # : 0.0 K/uL  Auto Neutrophil % : 84.7 %  Auto Lymphocyte % : 7.3 %  Auto Monocyte % : 6.5 %  Auto Eosinophil % : 1.2 %  Auto Basophil % : 0.3 %    12-02    141  |  104  |  49<H>  ----------------------------<  132<H>  4.5   |  20<L>  |  2.16<H>    Ca    9.4      02 Dec 2018 16:42    TPro  7.0  /  Alb  3.6  /  TBili  0.3  /  DBili  x   /  AST  16  /  ALT  7<L>  /  AlkPhos  49  12-02    LIVER FUNCTIONS - ( 02 Dec 2018 16:42 )  Alb: 3.6 g/dL / Pro: 7.0 g/dL / ALK PHOS: 49 U/L / ALT: 7 U/L / AST: 16 U/L / GGT: x             PT/INR - ( 02 Dec 2018 16:42 )   PT: 13.5 sec;   INR: 1.17 ratio         PTT - ( 02 Dec 2018 16:42 )  PTT:25.0 sec      Troponin T, High Sensitivity (12.02.18 @ 16:42)    Troponin T, High Sensitivity Result: 36      16:42 - VBG - pH: 7.40  | pCO2: 40    | pO2: 84    | Lactate: 1.3      Rapid Respiratory Viral Panel (12.02.18 @ 16:42)    Rapid RVP Result: NotDetec          EKG:       Imaging:    < from: Xray Chest 1 View- PORTABLE-Urgent (12.02.18 @ 17:17) >    INTERPRETATION:  pulmonary edema    < end of copied text > CBC Full  -  ( 02 Dec 2018 16:42 )  WBC Count : 6.0 K/uL  Hemoglobin : 9.4 g/dL   Hematocrit : 28.6 %  Platelet Count - Automated : 138 K/uL  Mean Cell Volume : 88.7 fl  Mean Cell Hemoglobin : 29.3 pg  Mean Cell Hemoglobin Concentration : 33.0 gm/dL  Auto Neutrophil # : 5.1 K/uL  Auto Lymphocyte # : 0.4 K/uL  Auto Monocyte # : 0.4 K/uL  Auto Eosinophil # : 0.1 K/uL  Auto Basophil # : 0.0 K/uL  Auto Neutrophil % : 84.7 %  Auto Lymphocyte % : 7.3 %  Auto Monocyte % : 6.5 %  Auto Eosinophil % : 1.2 %  Auto Basophil % : 0.3 %    12-02    141  |  104  |  49<H>  ----------------------------<  132<H>  4.5   |  20<L>  |  2.16<H>    Ca    9.4      02 Dec 2018 16:42    TPro  7.0  /  Alb  3.6  /  TBili  0.3  /  DBili  x   /  AST  16  /  ALT  7<L>  /  AlkPhos  49  12-02    LIVER FUNCTIONS - ( 02 Dec 2018 16:42 )  Alb: 3.6 g/dL / Pro: 7.0 g/dL / ALK PHOS: 49 U/L / ALT: 7 U/L / AST: 16 U/L / GGT: x             PT/INR - ( 02 Dec 2018 16:42 )   PT: 13.5 sec;   INR: 1.17 ratio         PTT - ( 02 Dec 2018 16:42 )  PTT:25.0 sec      Troponin T, High Sensitivity (12.02.18 @ 16:42)    Troponin T, High Sensitivity Result: 36      16:42 - VBG - pH: 7.40  | pCO2: 40    | pO2: 84    | Lactate: 1.3      Rapid Respiratory Viral Panel (12.02.18 @ 16:42)    Rapid RVP Result: NotDetec          EKG: unable to locate      Imaging:    < from: Xray Chest 1 View- PORTABLE-Urgent (12.02.18 @ 17:17) >    INTERPRETATION:  pulmonary edema    < end of copied text > Labs reviewed  CBC Full  -  ( 02 Dec 2018 16:42 )  WBC Count : 6.0 K/uL  Hemoglobin : 9.4 g/dL   Hematocrit : 28.6 %  Platelet Count - Automated : 138 K/uL  Mean Cell Volume : 88.7 fl  Mean Cell Hemoglobin : 29.3 pg  Mean Cell Hemoglobin Concentration : 33.0 gm/dL  Auto Neutrophil # : 5.1 K/uL  Auto Lymphocyte # : 0.4 K/uL  Auto Monocyte # : 0.4 K/uL  Auto Eosinophil # : 0.1 K/uL  Auto Basophil # : 0.0 K/uL  Auto Neutrophil % : 84.7 %  Auto Lymphocyte % : 7.3 %  Auto Monocyte % : 6.5 %  Auto Eosinophil % : 1.2 %  Auto Basophil % : 0.3 %    12-02    141  |  104  |  49<H>  ----------------------------<  132<H>  4.5   |  20<L>  |  2.16<H>    Ca    9.4      02 Dec 2018 16:42    TPro  7.0  /  Alb  3.6  /  TBili  0.3  /  DBili  x   /  AST  16  /  ALT  7<L>  /  AlkPhos  49  12-02    LIVER FUNCTIONS - ( 02 Dec 2018 16:42 )  Alb: 3.6 g/dL / Pro: 7.0 g/dL / ALK PHOS: 49 U/L / ALT: 7 U/L / AST: 16 U/L / GGT: x             PT/INR - ( 02 Dec 2018 16:42 )   PT: 13.5 sec;   INR: 1.17 ratio         PTT - ( 02 Dec 2018 16:42 )  PTT:25.0 sec      Troponin T, High Sensitivity (12.02.18 @ 16:42)    Troponin T, High Sensitivity Result: 36      16:42 - VBG - pH: 7.40  | pCO2: 40    | pO2: 84    | Lactate: 1.3      Rapid Respiratory Viral Panel (12.02.18 @ 16:42)    Rapid RVP Result: NotDetec          EKG: unable to locate; will reorder      Imaging reviewed    < from: Xray Chest 1 View- PORTABLE-Urgent (12.02.18 @ 17:17) >    INTERPRETATION:  pulmonary edema    < end of copied text >

## 2018-12-02 NOTE — ED ADULT NURSE NOTE - NSIMPLEMENTINTERV_GEN_ALL_ED
Implemented All Fall with Harm Risk Interventions:  [place] to call system. Call bell, personal items and telephone within reach. Instruct patient to call for assistance. Room bathroom lighting operational. Non-slip footwear when patient is off stretcher. Physically safe environment: no spills, clutter or unnecessary equipment. Stretcher in lowest position, wheels locked, appropriate side rails in place. Provide visual cue, wrist band, yellow gown, etc. Monitor gait and stability. Monitor for mental status changes and reorient to person, place, and time. Review medications for side effects contributing to fall risk. Reinforce activity limits and safety measures with patient and family. Provide visual clues: red socks.

## 2018-12-02 NOTE — H&P ADULT - PROBLEM SELECTOR PLAN 3
Patient with HTN and CAD s/p 5 stents now with acute decompensated heart failure. Cardiac cath (11/2018) showing no significant stenosis.   - c/w ASA and plavix  - c/w toprol 100   - c/w statin Patient with HTN and CAD s/p 5 stents now with acute decompensated heart failure. Cardiac cath (11/2018) showing no significant stenosis.   - c/w ASA and plavix  - c/w toprol 100   - c/w nifedipine 60 mg  - c/w hydralazine 100 mg TID  - c/w statin

## 2018-12-03 DIAGNOSIS — I25.10 ATHEROSCLEROTIC HEART DISEASE OF NATIVE CORONARY ARTERY WITHOUT ANGINA PECTORIS: ICD-10-CM

## 2018-12-03 LAB
ALBUMIN SERPL ELPH-MCNC: 3.4 G/DL — SIGNIFICANT CHANGE UP (ref 3.3–5)
ALP SERPL-CCNC: 46 U/L — SIGNIFICANT CHANGE UP (ref 40–120)
ALT FLD-CCNC: 7 U/L — LOW (ref 10–45)
ANION GAP SERPL CALC-SCNC: 16 MMOL/L — SIGNIFICANT CHANGE UP (ref 5–17)
AST SERPL-CCNC: 11 U/L — SIGNIFICANT CHANGE UP (ref 10–40)
BILIRUB SERPL-MCNC: 0.6 MG/DL — SIGNIFICANT CHANGE UP (ref 0.2–1.2)
BUN SERPL-MCNC: 47 MG/DL — HIGH (ref 7–23)
CALCIUM SERPL-MCNC: 9.2 MG/DL — SIGNIFICANT CHANGE UP (ref 8.4–10.5)
CHLORIDE SERPL-SCNC: 102 MMOL/L — SIGNIFICANT CHANGE UP (ref 96–108)
CO2 SERPL-SCNC: 23 MMOL/L — SIGNIFICANT CHANGE UP (ref 22–31)
CREAT SERPL-MCNC: 2.22 MG/DL — HIGH (ref 0.5–1.3)
GLUCOSE BLDC GLUCOMTR-MCNC: 161 MG/DL — HIGH (ref 70–99)
GLUCOSE BLDC GLUCOMTR-MCNC: 169 MG/DL — HIGH (ref 70–99)
GLUCOSE SERPL-MCNC: 115 MG/DL — HIGH (ref 70–99)
HCT VFR BLD CALC: 28.1 % — LOW (ref 39–50)
HGB BLD-MCNC: 8.9 G/DL — LOW (ref 13–17)
MAGNESIUM SERPL-MCNC: 2.2 MG/DL — SIGNIFICANT CHANGE UP (ref 1.6–2.6)
MCHC RBC-ENTMCNC: 28.1 PG — SIGNIFICANT CHANGE UP (ref 27–34)
MCHC RBC-ENTMCNC: 31.5 GM/DL — LOW (ref 32–36)
MCV RBC AUTO: 89.1 FL — SIGNIFICANT CHANGE UP (ref 80–100)
PHOSPHATE SERPL-MCNC: 4.9 MG/DL — HIGH (ref 2.5–4.5)
PLATELET # BLD AUTO: 129 K/UL — LOW (ref 150–400)
POTASSIUM SERPL-MCNC: 3.3 MMOL/L — LOW (ref 3.5–5.3)
POTASSIUM SERPL-SCNC: 3.3 MMOL/L — LOW (ref 3.5–5.3)
PROT SERPL-MCNC: 6.7 G/DL — SIGNIFICANT CHANGE UP (ref 6–8.3)
RBC # BLD: 3.15 M/UL — LOW (ref 4.2–5.8)
RBC # FLD: 14.4 % — SIGNIFICANT CHANGE UP (ref 10.3–14.5)
SODIUM SERPL-SCNC: 141 MMOL/L — SIGNIFICANT CHANGE UP (ref 135–145)
WBC # BLD: 5.4 K/UL — SIGNIFICANT CHANGE UP (ref 3.8–10.5)
WBC # FLD AUTO: 5.4 K/UL — SIGNIFICANT CHANGE UP (ref 3.8–10.5)

## 2018-12-03 PROCEDURE — 93970 EXTREMITY STUDY: CPT | Mod: 26

## 2018-12-03 PROCEDURE — 99233 SBSQ HOSP IP/OBS HIGH 50: CPT

## 2018-12-03 PROCEDURE — 93010 ELECTROCARDIOGRAM REPORT: CPT

## 2018-12-03 RX ORDER — DEXTROSE 50 % IN WATER 50 %
12.5 SYRINGE (ML) INTRAVENOUS ONCE
Qty: 0 | Refills: 0 | Status: DISCONTINUED | OUTPATIENT
Start: 2018-12-03 | End: 2018-12-11

## 2018-12-03 RX ORDER — DEXTROSE 50 % IN WATER 50 %
25 SYRINGE (ML) INTRAVENOUS ONCE
Qty: 0 | Refills: 0 | Status: DISCONTINUED | OUTPATIENT
Start: 2018-12-03 | End: 2018-12-11

## 2018-12-03 RX ORDER — INSULIN LISPRO 100/ML
VIAL (ML) SUBCUTANEOUS AT BEDTIME
Qty: 0 | Refills: 0 | Status: DISCONTINUED | OUTPATIENT
Start: 2018-12-03 | End: 2018-12-11

## 2018-12-03 RX ORDER — INSULIN LISPRO 100/ML
VIAL (ML) SUBCUTANEOUS
Qty: 0 | Refills: 0 | Status: DISCONTINUED | OUTPATIENT
Start: 2018-12-03 | End: 2018-12-11

## 2018-12-03 RX ORDER — SODIUM CHLORIDE 9 MG/ML
1000 INJECTION, SOLUTION INTRAVENOUS
Qty: 0 | Refills: 0 | Status: DISCONTINUED | OUTPATIENT
Start: 2018-12-03 | End: 2018-12-11

## 2018-12-03 RX ORDER — GLUCAGON INJECTION, SOLUTION 0.5 MG/.1ML
1 INJECTION, SOLUTION SUBCUTANEOUS ONCE
Qty: 0 | Refills: 0 | Status: DISCONTINUED | OUTPATIENT
Start: 2018-12-03 | End: 2018-12-11

## 2018-12-03 RX ORDER — POTASSIUM CHLORIDE 20 MEQ
20 PACKET (EA) ORAL
Qty: 0 | Refills: 0 | Status: COMPLETED | OUTPATIENT
Start: 2018-12-03 | End: 2018-12-03

## 2018-12-03 RX ORDER — DEXTROSE 50 % IN WATER 50 %
15 SYRINGE (ML) INTRAVENOUS ONCE
Qty: 0 | Refills: 0 | Status: DISCONTINUED | OUTPATIENT
Start: 2018-12-03 | End: 2018-12-11

## 2018-12-03 RX ADMIN — Medication 60 MILLIGRAM(S): at 17:58

## 2018-12-03 RX ADMIN — Medication 40 MILLIGRAM(S): at 05:00

## 2018-12-03 RX ADMIN — Medication 20 MILLIEQUIVALENT(S): at 17:58

## 2018-12-03 RX ADMIN — BUDESONIDE AND FORMOTEROL FUMARATE DIHYDRATE 2 PUFF(S): 160; 4.5 AEROSOL RESPIRATORY (INHALATION) at 17:59

## 2018-12-03 RX ADMIN — SERTRALINE 100 MILLIGRAM(S): 25 TABLET, FILM COATED ORAL at 13:33

## 2018-12-03 RX ADMIN — Medication 50 MILLIGRAM(S): at 20:22

## 2018-12-03 RX ADMIN — HEPARIN SODIUM 5000 UNIT(S): 5000 INJECTION INTRAVENOUS; SUBCUTANEOUS at 05:00

## 2018-12-03 RX ADMIN — Medication 1: at 18:06

## 2018-12-03 RX ADMIN — HEPARIN SODIUM 5000 UNIT(S): 5000 INJECTION INTRAVENOUS; SUBCUTANEOUS at 17:58

## 2018-12-03 RX ADMIN — CLOPIDOGREL BISULFATE 75 MILLIGRAM(S): 75 TABLET, FILM COATED ORAL at 13:33

## 2018-12-03 RX ADMIN — Medication 1: at 13:31

## 2018-12-03 RX ADMIN — Medication 60 MILLIGRAM(S): at 05:00

## 2018-12-03 RX ADMIN — SENNA PLUS 2 TABLET(S): 8.6 TABLET ORAL at 22:23

## 2018-12-03 RX ADMIN — Medication 2 MILLIGRAM(S): at 22:23

## 2018-12-03 RX ADMIN — Medication 5 MILLIGRAM(S): at 17:05

## 2018-12-03 RX ADMIN — Medication 40 MILLIGRAM(S): at 17:59

## 2018-12-03 RX ADMIN — Medication 50 MILLIGRAM(S): at 08:30

## 2018-12-03 RX ADMIN — Medication 81 MILLIGRAM(S): at 13:33

## 2018-12-03 RX ADMIN — BUDESONIDE AND FORMOTEROL FUMARATE DIHYDRATE 2 PUFF(S): 160; 4.5 AEROSOL RESPIRATORY (INHALATION) at 05:00

## 2018-12-03 RX ADMIN — PANTOPRAZOLE SODIUM 40 MILLIGRAM(S): 20 TABLET, DELAYED RELEASE ORAL at 05:00

## 2018-12-03 RX ADMIN — Medication 20 MILLIEQUIVALENT(S): at 13:33

## 2018-12-03 RX ADMIN — INSULIN GLARGINE 5 UNIT(S): 100 INJECTION, SOLUTION SUBCUTANEOUS at 22:23

## 2018-12-03 RX ADMIN — Medication 50 MILLIGRAM(S): at 13:33

## 2018-12-03 RX ADMIN — SIMVASTATIN 20 MILLIGRAM(S): 20 TABLET, FILM COATED ORAL at 22:23

## 2018-12-03 RX ADMIN — Medication 50 MILLIGRAM(S): at 05:00

## 2018-12-03 NOTE — PROGRESS NOTE ADULT - PROBLEM SELECTOR PLAN 2
Patient with severe pulm HTN likely group 2 given diastolic dysfunction as well as significant mitral and aortic insufficient as well as possible group 3 from known COPD. Prior MARBIN also noting chordae tendinae rupture of mitral valve, not intervened on  - address COPD as below  - f/u CT sx regarding role of repairing mitral valve chordae  - BP control Hemoglobin A1C 4.7 in November.  Appeared during prior admission to have steroid induced hyperglycemia.  Continue Lantus 5 units, titrate down as tolerated.  Continue low dose sliding scale coverage.  Completing Prednisone taper.

## 2018-12-03 NOTE — PROGRESS NOTE ADULT - PROBLEM SELECTOR PLAN 5
On breo. No evidence of exacerbation  - c/w symbicort  - prn duonebs Stable.  Avoid nephrotoxins.  Mild hypokalemia this morning, supplemented.

## 2018-12-03 NOTE — PROGRESS NOTE ADULT - PROBLEM SELECTOR PLAN 7
Cr 2.15 on admission, appears improved from prior admission.   - avoid nephrotoxins  - dose meds for GFR 20s  - currently electrolytes, BUN, serum pH acceptable  - trend BMP in light of ditrishaes Reports intermittent dysphagia with liquids.  Speech path eval.  Change to nectar thick liquids pending their input.

## 2018-12-03 NOTE — CHART NOTE - NSCHARTNOTEFT_GEN_A_CORE
83-year-old man with PMH HTN, HLD, Type 2 DM, CAD s/p 6 stents (last stent 6 years ago), HFpEF (EF 75%, and CKD stage 4, presents with shortness of breath for 4 days, found to have pulmonary edema concerning for decompensated heart failure in setting of mitral valve insufficiency.  Recently admitted, found to have severe MR with torn chordae.  Patient is known to our service; was seen by structural heart team and CT surgery, for possible Mitraclip; know to be evaluated for cardiac surgery; Patient states is amenable for valvular surgery.  Discussed with Dr. Stroud.  CTS attending to see patient in AM for further evaluation. 83-year-old man with PMH HTN, HLD, Type 2 DM, CAD s/p 6 stents (last stent 6 years ago), HFpEF (EF 75%, and CKD stage 4, presents with shortness of breath for 4 days, found to have pulmonary edema concerning for decompensated heart failure in setting of mitral valve insufficiency.  Recently admitted, found to have severe MR with torn chordae.  Patient is known to our service; was seen by structural heart team and CT surgery, for possible Mitraclip. Recent MARBIN from:  (11.16.18 @ 09:08) Mitral valve with torn chordae seen associated with the lateral aspect of the posterior leaflet Severe eccentric mitral regurgitation. Late systolic  reversal noted in the pulmonary veins. Aortic Valve/Aorta: Calcified trileafletaortic valve with decreased opening. Estimated aortic valve area equals 1.8  sqcm (by continuity equation), consistent with mild aortic stenosis. Moderate-severe aortic regurgitation. Now to be evaluated for double valve cardiac surgery; Patient states is amenable to cardiac surgery. Discussed with Dr. Stroud.  CTS attending to see patient in AM for further evaluation. 83-year-old man with PMH HTN, HLD, Type 2 DM, CAD s/p 6 stents (last stent 6 years ago), HFpEF (EF 75%, and CKD stage 4, presents with shortness of breath for 4 days, found to have pulmonary edema concerning for decompensated heart failure in setting of mitral valve insufficiency.  Recently admitted, found to have severe MR with torn chordae.  Patient is known to our service; was seen by structural heart team and CT surgery, for possible Mitraclip. Recent MARBIN from:  (11.16.18 @ 09:08) Mitral valve with torn chordae seen associated with the lateral aspect of the posterior leaflet Severe eccentric mitral regurgitation. Late systolic reversal noted in the pulmonary veins. Aortic Valve/Aorta: Calcified trileafletaortic valve with decreased opening. Estimated aortic valve area equals 1.8 sqcm (by continuity equation), consistent with mild aortic stenosis. Moderate-severe aortic regurgitation. Now to be evaluated for double valve cardiac surgery; Patient states is amenable to cardiac surgery. Discussed with Dr. Stroud.  CTS attending to see patient in AM for further evaluation.

## 2018-12-03 NOTE — PROGRESS NOTE ADULT - PROBLEM SELECTOR PLAN 4
Received signed and completed form from Physician's Office. Form was faxed.    Gout flare during last admission on steroid taper. Taper to be complete on 12/3 with prednisone 5 mg  - one dose 5 mg prednisone tomorrow Stable.  Continue Symbicort.  Duoneb PRN.

## 2018-12-03 NOTE — PROGRESS NOTE ADULT - PROBLEM SELECTOR PLAN 6
Hx of T2DM. Hemoglobin A1C 4.7 11/2018, overly controlled given age  - appears during prior admission to have steroid induced hyperglycemia  - FSG and ISS qAC and qHS  - c/w lantus 5 units, titrate down as tolerated Continue ASA, Plavix, Metoprolol, statin.

## 2018-12-03 NOTE — PROGRESS NOTE ADULT - PROBLEM SELECTOR PLAN 1
Patient presenting with worsening dyspnea with pulmonary edema in the setting of known severe MR with severe pulm HTN.  Continue supplemental oxygen, IV Lasix BID.  Cardiology consult called. Patient presenting with worsening dyspnea with pulmonary edema secondary to acute on chronic HFpEF in the setting of known severe MR with severe pulm HTN.  Continue supplemental oxygen, IV Lasix BID.  Cardiology consult called.

## 2018-12-03 NOTE — PROGRESS NOTE ADULT - PROBLEM SELECTOR PLAN 3
Patient with severe pulm HTN likely group 2 given diastolic dysfunction as well as significant mitral and aortic insufficient as well as possible group 3 from known COPD.  Patient with HTN and CAD s/p 5 stents now with acute decompensated heart failure. Cardiac cath (11/2018) showing no significant stenosis.   - c/w ASA and plavix  - c/w toprol 100   - c/w nifedipine 60 mg  - c/w hydralazine 100 mg TID  - c/w statin Gout flare during last admission on steroid taper.  Taper to be complete on 12/3 with prednisone 5 mg.

## 2018-12-03 NOTE — PROGRESS NOTE ADULT - SUBJECTIVE AND OBJECTIVE BOX
SUBJECTIVE:  Resting in bed.  Denies any SOB at rest currently on 4L NC, but reports intermittent SOB including SOB that woke him up from sleep and appears to get a little SOB with speaking.  Reports some pleuritic right-sided CP with coughing.  No nausea, but on questioning does report some intermittent dysphagia to thin liquids.  NAD.    MEDICATIONS  (STANDING):  aspirin enteric coated 81 milliGRAM(s) Oral daily  buDESOnide 160 MICROgram(s)/formoterol 4.5 MICROgram(s) Inhaler 2 Puff(s) Inhalation two times a day  clopidogrel Tablet 75 milliGRAM(s) Oral daily  dextrose 5%. 1000 milliLiter(s) (50 mL/Hr) IV Continuous <Continuous>  dextrose 50% Injectable 12.5 Gram(s) IV Push once  dextrose 50% Injectable 25 Gram(s) IV Push once  dextrose 50% Injectable 25 Gram(s) IV Push once  doxazosin 2 milliGRAM(s) Oral at bedtime  furosemide   Injectable 40 milliGRAM(s) IV Push every 12 hours  heparin  Injectable 5000 Unit(s) SubCutaneous every 12 hours  hydrALAZINE 50 milliGRAM(s) Oral <User Schedule>  insulin glargine Injectable (LANTUS) 5 Unit(s) SubCutaneous at bedtime  insulin lispro (HumaLOG) corrective regimen sliding scale   SubCutaneous three times a day before meals  insulin lispro (HumaLOG) corrective regimen sliding scale   SubCutaneous at bedtime  metoprolol tartrate 50 milliGRAM(s) Oral <User Schedule>  NIFEdipine XL 60 milliGRAM(s) Oral <User Schedule>  pantoprazole    Tablet 40 milliGRAM(s) Oral before breakfast  potassium chloride    Tablet ER 20 milliEquivalent(s) Oral every 2 hours  predniSONE   Tablet 5 milliGRAM(s) Oral once  senna 2 Tablet(s) Oral at bedtime  sertraline 100 milliGRAM(s) Oral daily  simvastatin 20 milliGRAM(s) Oral at bedtime    MEDICATIONS  (PRN):  dextrose 40% Gel 15 Gram(s) Oral once PRN Blood Glucose LESS THAN 70 milliGRAM(s)/deciliter  glucagon  Injectable 1 milliGRAM(s) IntraMuscular once PRN Glucose LESS THAN 70 milligrams/deciliter      Vital Signs Last 24 Hrs  T(C): 36.8 (03 Dec 2018 10:27), Max: 37.4 (02 Dec 2018 15:34)  T(F): 98.3 (03 Dec 2018 10:27), Max: 99.3 (02 Dec 2018 15:34)  HR: 67 (03 Dec 2018 13:12) (65 - 82)  BP: 127/53 (03 Dec 2018 13:12) (127/53 - 164/71)  BP(mean): --  RR: 22 (03 Dec 2018 13:12) (17 - 23)  SpO2: 97% (03 Dec 2018 13:12) (90% - 99%)    CAPILLARY BLOOD GLUCOSE      POCT Blood Glucose.: 167 mg/dL (03 Dec 2018 12:42)  POCT Blood Glucose.: 127 mg/dL (03 Dec 2018 08:27)  POCT Blood Glucose.: 128 mg/dL (02 Dec 2018 22:09)    I&O's Summary    02 Dec 2018 07:01  -  03 Dec 2018 07:00  --------------------------------------------------------  IN: 480 mL / OUT: 1100 mL / NET: -620 mL        PHYSICAL EXAM:  GENERAL: Looks stated age, NAD  CARDIOVASCULAR: Normal S1, S2.  JELLY best heard at apex.  PULMONARY: Lungs clear to auscultation bilaterally with crackles at the bases.  GI: Abdomen soft, Nontender. Bowel sounds present  MSK/Ext:  Mild 1+ leg edema b/l.  No calf tenderness bilaterally  PSYCH: Normal Affect.      LABS:                        8.9    5.4   )-----------( 129      ( 03 Dec 2018 07:04 )             28.1     12-03    141  |  102  |  47<H>  ----------------------------<  115<H>  3.3<L>   |  23  |  2.22<H>    Ca    9.2      03 Dec 2018 07:04  Phos  4.9     12-03  Mg     2.2     12-03    TPro  6.7  /  Alb  3.4  /  TBili  0.6  /  DBili  x   /  AST  11  /  ALT  7<L>  /  AlkPhos  46  12-03    PT/INR - ( 02 Dec 2018 16:42 )   PT: 13.5 sec;   INR: 1.17 ratio         PTT - ( 02 Dec 2018 16:42 )  PTT:25.0 sec      Urinalysis Basic - ( 02 Dec 2018 19:48 )    Color: Colorless / Appearance: Clear / S.009 / pH: x  Gluc: x / Ketone: Negative  / Bili: Negative / Urobili: Negative   Blood: x / Protein: Negative / Nitrite: Negative   Leuk Esterase: Negative / RBC: x / WBC x   Sq Epi: x / Non Sq Epi: x / Bacteria: x          RADIOLOGY & ADDITIONAL TESTS:

## 2018-12-03 NOTE — PROGRESS NOTE ADULT - ASSESSMENT
The patient is an 83-year-old man with PMH HTN, HLD, Type 2 DM, CAD s/p 6 stents (last stent 6 years ago), HFpEF (EF 75%, and CKD stage 4, presents with shortness of breath for 4 days, found to have pulmonary edema concerning for decompensated heart failure in setting of mitral valve insufficiency.  Recently admitted, found to have severe MR with torn chordae.  Seen by structural heart team and CT surgery, plan was for outpatient follow-up for Mitraclip.

## 2018-12-04 ENCOUNTER — TRANSCRIPTION ENCOUNTER (OUTPATIENT)
Age: 83
End: 2018-12-04

## 2018-12-04 LAB
ANION GAP SERPL CALC-SCNC: 17 MMOL/L — SIGNIFICANT CHANGE UP (ref 5–17)
BASOPHILS # BLD AUTO: 0 K/UL — SIGNIFICANT CHANGE UP (ref 0–0.2)
BASOPHILS NFR BLD AUTO: 0 % — SIGNIFICANT CHANGE UP (ref 0–2)
BUN SERPL-MCNC: 59 MG/DL — HIGH (ref 7–23)
CALCIUM SERPL-MCNC: 9.2 MG/DL — SIGNIFICANT CHANGE UP (ref 8.4–10.5)
CHLORIDE SERPL-SCNC: 101 MMOL/L — SIGNIFICANT CHANGE UP (ref 96–108)
CO2 SERPL-SCNC: 25 MMOL/L — SIGNIFICANT CHANGE UP (ref 22–31)
CREAT SERPL-MCNC: 2.44 MG/DL — HIGH (ref 0.5–1.3)
EOSINOPHIL # BLD AUTO: 0.06 K/UL — SIGNIFICANT CHANGE UP (ref 0–0.5)
EOSINOPHIL NFR BLD AUTO: 1.2 % — SIGNIFICANT CHANGE UP (ref 0–6)
GLUCOSE BLDC GLUCOMTR-MCNC: 135 MG/DL — HIGH (ref 70–99)
GLUCOSE BLDC GLUCOMTR-MCNC: 173 MG/DL — HIGH (ref 70–99)
GLUCOSE BLDC GLUCOMTR-MCNC: 201 MG/DL — HIGH (ref 70–99)
GLUCOSE BLDC GLUCOMTR-MCNC: 239 MG/DL — HIGH (ref 70–99)
GLUCOSE SERPL-MCNC: 140 MG/DL — HIGH (ref 70–99)
HCT VFR BLD CALC: 25.7 % — LOW (ref 39–50)
HGB BLD-MCNC: 8.1 G/DL — LOW (ref 13–17)
IMM GRANULOCYTES NFR BLD AUTO: 0.6 % — SIGNIFICANT CHANGE UP (ref 0–1.5)
LYMPHOCYTES # BLD AUTO: 0.62 K/UL — LOW (ref 1–3.3)
LYMPHOCYTES # BLD AUTO: 12.8 % — LOW (ref 13–44)
MAGNESIUM SERPL-MCNC: 2.2 MG/DL — SIGNIFICANT CHANGE UP (ref 1.6–2.6)
MCHC RBC-ENTMCNC: 28.4 PG — SIGNIFICANT CHANGE UP (ref 27–34)
MCHC RBC-ENTMCNC: 31.5 GM/DL — LOW (ref 32–36)
MCV RBC AUTO: 90.2 FL — SIGNIFICANT CHANGE UP (ref 80–100)
MONOCYTES # BLD AUTO: 0.38 K/UL — SIGNIFICANT CHANGE UP (ref 0–0.9)
MONOCYTES NFR BLD AUTO: 7.8 % — SIGNIFICANT CHANGE UP (ref 2–14)
NEUTROPHILS # BLD AUTO: 3.76 K/UL — SIGNIFICANT CHANGE UP (ref 1.8–7.4)
NEUTROPHILS NFR BLD AUTO: 77.6 % — HIGH (ref 43–77)
PLATELET # BLD AUTO: 131 K/UL — LOW (ref 150–400)
POTASSIUM SERPL-MCNC: 3.2 MMOL/L — LOW (ref 3.5–5.3)
POTASSIUM SERPL-SCNC: 3.2 MMOL/L — LOW (ref 3.5–5.3)
RBC # BLD: 2.85 M/UL — LOW (ref 4.2–5.8)
RBC # FLD: 15.6 % — HIGH (ref 10.3–14.5)
SODIUM SERPL-SCNC: 143 MMOL/L — SIGNIFICANT CHANGE UP (ref 135–145)
WBC # BLD: 4.85 K/UL — SIGNIFICANT CHANGE UP (ref 3.8–10.5)
WBC # FLD AUTO: 4.85 K/UL — SIGNIFICANT CHANGE UP (ref 3.8–10.5)

## 2018-12-04 PROCEDURE — 99233 SBSQ HOSP IP/OBS HIGH 50: CPT

## 2018-12-04 RX ORDER — POTASSIUM CHLORIDE 20 MEQ
40 PACKET (EA) ORAL EVERY 4 HOURS
Qty: 0 | Refills: 0 | Status: COMPLETED | OUTPATIENT
Start: 2018-12-04 | End: 2018-12-04

## 2018-12-04 RX ORDER — ACETAMINOPHEN 500 MG
1000 TABLET ORAL ONCE
Qty: 0 | Refills: 0 | Status: COMPLETED | OUTPATIENT
Start: 2018-12-04 | End: 2018-12-04

## 2018-12-04 RX ADMIN — CLOPIDOGREL BISULFATE 75 MILLIGRAM(S): 75 TABLET, FILM COATED ORAL at 12:21

## 2018-12-04 RX ADMIN — BUDESONIDE AND FORMOTEROL FUMARATE DIHYDRATE 2 PUFF(S): 160; 4.5 AEROSOL RESPIRATORY (INHALATION) at 05:05

## 2018-12-04 RX ADMIN — Medication 81 MILLIGRAM(S): at 12:21

## 2018-12-04 RX ADMIN — Medication 60 MILLIGRAM(S): at 05:06

## 2018-12-04 RX ADMIN — PANTOPRAZOLE SODIUM 40 MILLIGRAM(S): 20 TABLET, DELAYED RELEASE ORAL at 05:07

## 2018-12-04 RX ADMIN — Medication 40 MILLIEQUIVALENT(S): at 14:02

## 2018-12-04 RX ADMIN — Medication 50 MILLIGRAM(S): at 14:02

## 2018-12-04 RX ADMIN — SERTRALINE 100 MILLIGRAM(S): 25 TABLET, FILM COATED ORAL at 12:21

## 2018-12-04 RX ADMIN — Medication 40 MILLIEQUIVALENT(S): at 17:45

## 2018-12-04 RX ADMIN — Medication 40 MILLIGRAM(S): at 05:05

## 2018-12-04 RX ADMIN — Medication 2: at 12:49

## 2018-12-04 RX ADMIN — HEPARIN SODIUM 5000 UNIT(S): 5000 INJECTION INTRAVENOUS; SUBCUTANEOUS at 05:05

## 2018-12-04 RX ADMIN — Medication 1: at 17:45

## 2018-12-04 RX ADMIN — Medication 50 MILLIGRAM(S): at 05:06

## 2018-12-04 RX ADMIN — SIMVASTATIN 20 MILLIGRAM(S): 20 TABLET, FILM COATED ORAL at 21:27

## 2018-12-04 RX ADMIN — Medication 40 MILLIGRAM(S): at 17:46

## 2018-12-04 RX ADMIN — Medication 2 MILLIGRAM(S): at 21:27

## 2018-12-04 RX ADMIN — BUDESONIDE AND FORMOTEROL FUMARATE DIHYDRATE 2 PUFF(S): 160; 4.5 AEROSOL RESPIRATORY (INHALATION) at 18:00

## 2018-12-04 RX ADMIN — Medication 5 MILLIGRAM(S): at 17:44

## 2018-12-04 RX ADMIN — HEPARIN SODIUM 5000 UNIT(S): 5000 INJECTION INTRAVENOUS; SUBCUTANEOUS at 17:46

## 2018-12-04 RX ADMIN — Medication 60 MILLIGRAM(S): at 17:44

## 2018-12-04 RX ADMIN — INSULIN GLARGINE 5 UNIT(S): 100 INJECTION, SOLUTION SUBCUTANEOUS at 22:12

## 2018-12-04 RX ADMIN — Medication 400 MILLIGRAM(S): at 23:22

## 2018-12-04 RX ADMIN — Medication 50 MILLIGRAM(S): at 19:48

## 2018-12-04 RX ADMIN — Medication 50 MILLIGRAM(S): at 07:48

## 2018-12-04 RX ADMIN — SENNA PLUS 2 TABLET(S): 8.6 TABLET ORAL at 21:27

## 2018-12-04 NOTE — PHYSICAL THERAPY INITIAL EVALUATION ADULT - PERTINENT HX OF CURRENT PROBLEM, REHAB EVAL
83M with PMH HTN, HLD, T2DM, CAD s/p 6 stents (last stent 6 years ago), HFpEF (EF 75%, and CKD stage 4, presents with shortness of breath for 4 days, found to have pulmonary edema concerning for decompensated heart failure in setting of mitral valve insufficiency

## 2018-12-04 NOTE — DISCHARGE NOTE ADULT - PLAN OF CARE
improve symptoms oxygen supplement via nasal cannula  continue respiratory care continue heart medication as prescribed avoid kidney toxic medication oxygen supplement via nasal cannula 4 liters titrate to  keep pulse ox >90 percent  continue respiratory care continue heart medication as prescribed  low salt diet  monitor weight as described to adjust medication  follow up by hospice caleb

## 2018-12-04 NOTE — PROGRESS NOTE ADULT - PROBLEM SELECTOR PLAN 3
Gout flare during last admission on steroid taper.  Taper to be complete today with prednisone 5 mg.

## 2018-12-04 NOTE — SWALLOW BEDSIDE ASSESSMENT ADULT - PHARYNGEAL PHASE
Decreased laryngeal elevation/Multiple swallows/Complaints of pharyngeal stasis/reported that he "needs to use effort" to swallow and when these episodes are noted he has associated cough with p.o

## 2018-12-04 NOTE — DISCHARGE NOTE ADULT - PATIENT PORTAL LINK FT
You can access the [facility] Patient Portal, offered by [facility], by registering with the following website: [email or web address]

## 2018-12-04 NOTE — DISCHARGE NOTE ADULT - SECONDARY DIAGNOSIS.
Heart failure with preserved ejection fraction CKD (chronic kidney disease) stage 4, GFR 15-29 ml/min

## 2018-12-04 NOTE — DISCHARGE NOTE ADULT - INSTRUCTIONS
dysphagia 3 with nectar thick liquid low salt dysphagia 3 with nectar thick liquid-aspiration precaution while feeding

## 2018-12-04 NOTE — DISCHARGE NOTE ADULT - MEDICATION SUMMARY - MEDICATIONS TO CHANGE
I will SWITCH the dose or number of times a day I take the medications listed below when I get home from the hospital:    NIFEdipine 60 mg oral tablet, extended release  -- 2 tab(s) by mouth once a day    metoprolol succinate 100 mg oral tablet, extended release  -- 1 tab(s) by mouth once a day    hydrALAZINE  -- 125 milligram(s) by mouth 3 times a day I will SWITCH the dose or number of times a day I take the medications listed below when I get home from the hospital:    NIFEdipine 60 mg oral tablet, extended release  -- 2 tab(s) by mouth once a day    hydrALAZINE  -- 125 milligram(s) by mouth 3 times a day

## 2018-12-04 NOTE — PHYSICAL THERAPY INITIAL EVALUATION ADULT - BALANCE TRAINING, PT EVAL
Goal: Pt will improve balance one half grade to improve performance and safety of transfers and ambulation in 2 weeks.

## 2018-12-04 NOTE — PROGRESS NOTE ADULT - PROBLEM SELECTOR PLAN 1
Patient presenting with worsening dyspnea with pulmonary edema secondary to acute on chronic HFpEF in the setting of known severe MR with severe pulm HTN.  Leg edema seems to have responded to IV Lasix, but still with significant feelings of SOB and crackles on exam.  Continue supplemental oxygen, IV Lasix BID.  Question if perhaps Lasix should be decreased to daily giving rising creatinine.  Seen by CT surgery team, await attending evaluation.  Case discussed with cardiology consult attending, await their recommendations.

## 2018-12-04 NOTE — PROGRESS NOTE ADULT - PROBLEM SELECTOR PLAN 7
Reports intermittent dysphagia with liquids.  Speech path eval pending.  Changed to nectar thick liquids pending their input.

## 2018-12-04 NOTE — SWALLOW BEDSIDE ASSESSMENT ADULT - SLP GENERAL OBSERVATIONS
Pt on 5L NC. ALYSHA Oro stated that pt has hypoxic episodes with AMS due to cardiac condition. Pt on 5L NC. NP Preethi stated that pt has hypoxic episodes with episodic AMS due to cardiac condition. Reported that yesterday had an episode of same and RRT was being considered, but pt "came to" and was then responsive. Stated that he was supposed to f/u for CTS on outpt basis and did not do so and was now adm with HF. Stated that pt is DNR/I. Pt reports of post swallow in pharynx and esophagus and reports s/s suggestive of laryngeal penetration/aspiration on solids and liquids. Stated that he believes that this has occurred for the past few months

## 2018-12-04 NOTE — CHART NOTE - NSCHARTNOTEFT_GEN_A_CORE
follow up- As per dysphagia eval advised npo and modified barium swallow; d/w attending MD;   d/w pt and he understands the risk of aspiration; wants to continue with current diet with aspiration precaution;  pt is DNR ; he does not want any tube feeds; agreed for MBS to eval dysphagia.  Preethi Reynolds(NP)  [facility], [phone]

## 2018-12-04 NOTE — DISCHARGE NOTE ADULT - HOSPITAL COURSE
83-year-old man with PMH HTN, HLD, Type 2 DM, CAD s/p 6 stents (last stent 6 years ago), HFpEF (EF 75%, and CKD stage 4, presents with shortness of breath for 4 days, found to have pulmonary edema concerning for decompensated heart failure in setting of mitral valve insufficiency.  Recently admitted, found to have severe MR with torn chordae. 83-year-old man with PMH HTN, HLD, Type 2 DM, CAD s/p 6 stents (last stent 6 years ago), HFpEF (EF 75%, and CKD stage 4, presents with shortness of breath for 4 days, found to have pulmonary edema concerning for decompensated heart failure in setting of mitral valve insufficiency.  Recently admitted, found to have severe MR with torn chordae.  seen by cardiology, thoracic surgery- per  CT surgery, patient is not a candidate for an intervention on the mitral valve.  Continue supplemental oxygen and medical management  DNR order placed and form signed per patient's wishes.  seen by dysphagia eval team and as per patient wish continue with oral diet with aspiration precaution;  seen by palliative/hospice and per pt/family wish symptomatic management   discharge home with hospice ; 83-year-old man with PMH HTN, HLD, Type 2 DM, CAD s/p 6 stents (last stent 6 years ago), HFpEF (EF 75%, and CKD stage 4, presents with shortness of breath for 4 days, found to have pulmonary edema concerning for decompensated heart failure in setting of mitral valve insufficiency.  Recently admitted, found to have severe MR with torn chordae. Admitted for acute hypoxemic respiratory failure and acute decompensated diastolic heart failure. Seen by cardiology, thoracic surgery- per  CT surgery, patient is not a candidate for an intervention on the mitral valve. Continue supplemental oxygen and medical management. DNR/DNI. Seen by dysphagia eval team and as per patient wish continue with oral diet with aspiration precaution; seen by palliative/hospice and per pt/family wish symptomatic management. Discharge home with hospice

## 2018-12-04 NOTE — DISCHARGE NOTE ADULT - MEDICATION SUMMARY - MEDICATIONS TO STOP TAKING
I will STOP taking the medications listed below when I get home from the hospital:    Percocet 5/325 oral tablet  -- 1 tab(s) by mouth 2 times a day, As Needed    predniSONE 5 mg oral tablet  -- Taper   6 tabs (30mg) - on 11/21  4 tabs (20mg) 11/22,23, 24  3 tabs (15mg) 11/25, 26, 27  2 tabs (10mg) 11/28, 29, 30  1 tab (5mg) 12/1, 2, 3  -- It is very important that you take or use this exactly as directed.  Do not skip doses or discontinue unless directed by your doctor.  Obtain medical advice before taking any non-prescription drugs as some may affect the action of this medication.  Take with food or milk.

## 2018-12-04 NOTE — DISCHARGE NOTE ADULT - CARE PLAN
Principal Discharge DX:	Acute respiratory failure with hypoxia  Secondary Diagnosis:	Heart failure with preserved ejection fraction  Secondary Diagnosis:	CKD (chronic kidney disease) stage 4, GFR 15-29 ml/min Principal Discharge DX:	Acute respiratory failure with hypoxia  Goal:	improve symptoms  Assessment and plan of treatment:	oxygen supplement via nasal cannula  continue respiratory care  Secondary Diagnosis:	Heart failure with preserved ejection fraction  Assessment and plan of treatment:	continue heart medication as prescribed  Secondary Diagnosis:	CKD (chronic kidney disease) stage 4, GFR 15-29 ml/min  Assessment and plan of treatment:	avoid kidney toxic medication Principal Discharge DX:	Acute respiratory failure with hypoxia  Goal:	improve symptoms  Assessment and plan of treatment:	oxygen supplement via nasal cannula 4 liters titrate to  keep pulse ox >90 percent  continue respiratory care  Secondary Diagnosis:	Heart failure with preserved ejection fraction  Assessment and plan of treatment:	continue heart medication as prescribed  low salt diet  monitor weight as described to adjust medication  follow up by hospice doctro  Secondary Diagnosis:	CKD (chronic kidney disease) stage 4, GFR 15-29 ml/min  Assessment and plan of treatment:	avoid kidney toxic medication

## 2018-12-04 NOTE — DISCHARGE NOTE ADULT - CARE PROVIDER_API CALL
Brendan Bazzi), Cardiovascular Disease; Internal Medicine  [address] [address]  Phone: [phone]  Fax: [phone]    be harris  [phone]  Phone: (   )    -  Fax: (   )    -    hospice MD,   Phone: (   )    -  Fax: (   )    -

## 2018-12-04 NOTE — SWALLOW BEDSIDE ASSESSMENT ADULT - SWALLOW EVAL: DIAGNOSIS
Patient presents with c/o pharyngo-esophageal dysphagia with reports of post swallow in pharynx and esophagus and reports suggestive of laryngeal penetration/aspiration on solids and liquids.

## 2018-12-04 NOTE — CONSULT NOTE ADULT - NSHPATTENDINGPLANDISCUSS_GEN_ALL_CORE
Plan discussed with cardiology fellow, Dr. NAYAN Brown; patient seen and examined.       I was physically present for the key portions of the evaluation and management (E/M) service provided.    I agree with the above history, physical, and plan which I have reviewed and edited where appropriate.

## 2018-12-04 NOTE — PHYSICAL THERAPY INITIAL EVALUATION ADULT - PLANNED THERAPY INTERVENTIONS, PT EVAL
gait training/bed mobility training/transfer training/strengthening/balance training/Stairs Goal: Pt will go up/down 3 steps with + handrail independently in 2 weeks.

## 2018-12-04 NOTE — PHYSICAL THERAPY INITIAL EVALUATION ADULT - ADDITIONAL COMMENTS
(continuation of H&P) last admission, pt found to have severe MR with torn chordae, mod to severeAR and sever pulm HTN. CTS deferred 2/2 improvement with medical management. (continuation of H&P) last admission, pt found to have severe MR with torn chordae, mod to severeAR and sever pulm HTN. CTS deferred 2/2 improvement with medical management.  Pt lives in a private house with 3 steps to enter, + handrail and then resides on main level.

## 2018-12-04 NOTE — DISCHARGE NOTE ADULT - PROVIDER TOKENS
TOKEN:'[id number]',FREE:[LAST:[kimberly],FIRST:[be],PHONE:[(   )    -],FAX:[(   )    -],ADDRESS:[[phone]]],FREE:[LAST:[zakiya SCHROEDER],PHONE:[(   )    -],FAX:[(   )    -]]

## 2018-12-04 NOTE — PHYSICAL THERAPY INITIAL EVALUATION ADULT - STRENGTHENING, PT EVAL
Goal: Pt will increase strength >half a grade  t/o extremities to improve safety of transfers and ambulation in 2 weeks.

## 2018-12-04 NOTE — CONSULT NOTE ADULT - ASSESSMENT
A/P: 83 year old M pt with PMH of HTN, HLD, T2DM, CAD s/p multiple PCIs, HFpEF, severe MR with rupture chordae, and CKD p/w worsening SOB.    - clinically stable with stable SOB and improving LE edema  - c/w 40 iv lasix bid, monitor i/o's and daily weights  - c/w medical therapy with afterload reduction  - structural and ct surgery on board  - discuss regarding plan moving forward, possible surgical double valve replacement    Rodolfo Brown, #[id number]  Cardiology Fellow A/P: 83 year old M pt with PMH of HTN, HLD, T2DM, CAD s/p multiple PCIs, HFpEF, severe MR with rupture chordae, and CKD.  P/W worsening SOB.      - clinically stable with stable SOB and improving LE edema  - c/w 40mg IV Lasix bid, monitor i/o's and daily weights  - c/w medical therapy with afterload reduction  - structural and ct surgery on board  - discuss regarding plan moving forward, possible surgical double valve replacement      Rodolfo Brown, #[id number]  Cardiology Fellow    Brendan Bazzi M.D.  Cardiology Consult Service  [phone] or [phone]

## 2018-12-04 NOTE — PROGRESS NOTE ADULT - PROBLEM SELECTOR PLAN 5
Creatinine slowly rising.  Question if we should pull back on Lasix dosing, await cardiology recommendations.  Mild hypokalemia this morning, supplemented.  Magnesium level noted to be wnl.

## 2018-12-04 NOTE — PROGRESS NOTE ADULT - SUBJECTIVE AND OBJECTIVE BOX
SUBJECTIVE:  Seen with RN at bedside.  Reports intermittent SOB on oxygen via NC.  Reports orthopnea.  Was OOB to chair earlier and seemed to be breathing more comfortably sitting up per RN.  BM earlier today.  No N/V.  NAD.    MEDICATIONS  (STANDING):  aspirin enteric coated 81 milliGRAM(s) Oral daily  buDESOnide 160 MICROgram(s)/formoterol 4.5 MICROgram(s) Inhaler 2 Puff(s) Inhalation two times a day  clopidogrel Tablet 75 milliGRAM(s) Oral daily  dextrose 5%. 1000 milliLiter(s) (50 mL/Hr) IV Continuous <Continuous>  dextrose 50% Injectable 12.5 Gram(s) IV Push once  dextrose 50% Injectable 25 Gram(s) IV Push once  dextrose 50% Injectable 25 Gram(s) IV Push once  doxazosin 2 milliGRAM(s) Oral at bedtime  furosemide   Injectable 40 milliGRAM(s) IV Push every 12 hours  heparin  Injectable 5000 Unit(s) SubCutaneous every 12 hours  hydrALAZINE 50 milliGRAM(s) Oral <User Schedule>  insulin glargine Injectable (LANTUS) 5 Unit(s) SubCutaneous at bedtime  insulin lispro (HumaLOG) corrective regimen sliding scale   SubCutaneous three times a day before meals  insulin lispro (HumaLOG) corrective regimen sliding scale   SubCutaneous at bedtime  metoprolol tartrate 50 milliGRAM(s) Oral <User Schedule>  NIFEdipine XL 60 milliGRAM(s) Oral <User Schedule>  pantoprazole    Tablet 40 milliGRAM(s) Oral before breakfast  potassium chloride    Tablet ER 40 milliEquivalent(s) Oral every 4 hours  predniSONE   Tablet 5 milliGRAM(s) Oral once  senna 2 Tablet(s) Oral at bedtime  sertraline 100 milliGRAM(s) Oral daily  simvastatin 20 milliGRAM(s) Oral at bedtime    MEDICATIONS  (PRN):  dextrose 40% Gel 15 Gram(s) Oral once PRN Blood Glucose LESS THAN 70 milliGRAM(s)/deciliter  glucagon  Injectable 1 milliGRAM(s) IntraMuscular once PRN Glucose LESS THAN 70 milligrams/deciliter      Vital Signs Last 24 Hrs  T(C): 36.7 (04 Dec 2018 13:54), Max: 37.1 (04 Dec 2018 00:04)  T(F): 98 (04 Dec 2018 13:54), Max: 98.8 (04 Dec 2018 00:04)  HR: 73 (04 Dec 2018 13:54) (69 - 82)  BP: 143/55 (04 Dec 2018 13:54) (130/63 - 143/55)  BP(mean): --  RR: 20 (04 Dec 2018 13:54) (18 - 20)  SpO2: 93% (04 Dec 2018 13:54) (92% - 99%)    CAPILLARY BLOOD GLUCOSE      POCT Blood Glucose.: 239 mg/dL (04 Dec 2018 12:29)  POCT Blood Glucose.: 135 mg/dL (04 Dec 2018 08:51)  POCT Blood Glucose.: 161 mg/dL (03 Dec 2018 21:58)  POCT Blood Glucose.: 169 mg/dL (03 Dec 2018 17:24)    I&O's Summary    03 Dec 2018 07:01  -  04 Dec 2018 07:00  --------------------------------------------------------  IN: 720 mL / OUT: 1950 mL / NET: -1230 mL    04 Dec 2018 07:01  -  04 Dec 2018 15:05  --------------------------------------------------------  IN: 600 mL / OUT: 1100 mL / NET: -500 mL        PHYSICAL EXAM:  GENERAL: Looks stated age, NAD  CARDIOVASCULAR: Normal S1, S2  PULMONARY: Lungs clear to auscultation with bibasilar crackles.  GI: Abdomen soft, Nontender. Bowel sounds present  MSK/Ext:  No leg edema.  No calf tenderness bilaterally  PSYCH: Normal Affect.      LABS:                        8.1    4.85  )-----------( 131      ( 04 Dec 2018 07:58 )             25.7     12-04    143  |  101  |  59<H>  ----------------------------<  140<H>  3.2<L>   |  25  |  2.44<H>    Ca    9.2      04 Dec 2018 06:30  Phos  4.9     12-03  Mg     2.2     12-    TPro  6.7  /  Alb  3.4  /  TBili  0.6  /  DBili  x   /  AST  11  /  ALT  7<L>  /  AlkPhos  46  12-    PT/INR - ( 02 Dec 2018 16:42 )   PT: 13.5 sec;   INR: 1.17 ratio         PTT - ( 02 Dec 2018 16:42 )  PTT:25.0 sec      Urinalysis Basic - ( 02 Dec 2018 19:48 )    Color: Colorless / Appearance: Clear / S.009 / pH: x  Gluc: x / Ketone: Negative  / Bili: Negative / Urobili: Negative   Blood: x / Protein: Negative / Nitrite: Negative   Leuk Esterase: Negative / RBC: x / WBC x   Sq Epi: x / Non Sq Epi: x / Bacteria: x          RADIOLOGY & ADDITIONAL TESTS:

## 2018-12-04 NOTE — DISCHARGE NOTE ADULT - MEDICATION SUMMARY - MEDICATIONS TO TAKE
I will START or STAY ON the medications listed below when I get home from the hospital:    Aspirin Enteric Coated 81 mg oral delayed release tablet  -- 1 tab(s) by mouth once a day  -- Indication: For Antiplatelet    oxyCODONE 5 mg/5 mL oral solution  -- 5 milliliter(s) by mouth every 3 hours, As needed, Moderate Pain (4 - 6)/dyspnea MDD:8  -- Indication: For Pain    acetaminophen 325 mg oral tablet  -- 2 tab(s) by mouth every 6 hours, As needed,   mild pain  -- Indication: For Pain    doxazosin 2 mg oral tablet  -- 1 tab(s) by mouth once a day (at bedtime)  -- Indication: For CArdiovascular    Ativan 1 mg oral tablet  -- 1 tab(s) by mouth once a day (at bedtime), As Needed MDD:1  -- Indication: For Anxiety    sertraline 100 mg oral tablet  -- 2 tab(s) by mouth once a day  -- Indication: For mood    insulin glargine  -- 5 unit(s) subcutaneous once a day (at bedtime) monitor finger stick to adjust dose  -- Indication: For Type 2 diabetes mellitus    Zocor 20 mg oral tablet  -- 1 tab(s) by mouth once a day (at bedtime)  -- Indication: For lipids    Plavix 75 mg oral tablet  -- 1 tab(s) by mouth once a day  -- Indication: For Antiplatelet    metoprolol tartrate 50 mg oral tablet  -- 1 tab(s) by mouth once a day   -- Indication: For CArdiac    Breo Ellipta 100 mcg-25 mcg/inh inhalation powder  -- 1 puff(s) inhaled once a day  -- Indication: For bronchodilator    NIFEdipine 90 mg oral tablet, extended release  -- 1 tab(s) by mouth once a day at 6am  -- Indication: For CArdiac    furosemide 80 mg oral tablet  -- 1 tab(s) by mouth once a day  -- Indication: For Diuretic    Senna 8.6 mg oral tablet  -- 2 tab(s) by mouth once a day (at bedtime)  -- Indication: For laxative    pantoprazole 40 mg oral delayed release tablet  -- 1 tab(s) by mouth once a day  -- Indication: For for stomach    hydrALAZINE 25 mg oral tablet  -- 3 tab(s) by mouth 3 times a day at 6am , 2pm and 10 pm  -- Indication: For CArdiac I will START or STAY ON the medications listed below when I get home from the hospital:    Aspirin Enteric Coated 81 mg oral delayed release tablet  -- 1 tab(s) by mouth once a day  -- Indication: For Antiplatelet    oxyCODONE 5 mg/5 mL oral solution  -- 5 milliliter(s) by mouth every 3 hours, As needed, Moderate Pain (4 - 6)/dyspnea MDD:8  -- Indication: For Pain    acetaminophen 325 mg oral tablet  -- 2 tab(s) by mouth every 6 hours, As needed,   mild pain  -- Indication: For Pain    doxazosin 2 mg oral tablet  -- 1 tab(s) by mouth once a day (at bedtime)  -- Indication: For CArdiovascular    Ativan 1 mg oral tablet  -- 1 tab(s) by mouth once a day (at bedtime), As Needed MDD:1  -- Indication: For Anxiety    sertraline 100 mg oral tablet  -- 2 tab(s) by mouth once a day  -- Indication: For mood    insulin glargine  -- 5 unit(s) subcutaneous once a day (at bedtime) monitor finger stick to adjust dose  -- Indication: For Type 2 diabetes mellitus    Zocor 20 mg oral tablet  -- 1 tab(s) by mouth once a day (at bedtime)  -- Indication: For lipids    Plavix 75 mg oral tablet  -- 1 tab(s) by mouth once a day  -- Indication: For Antiplatelet    metoprolol succinate 100 mg oral tablet, extended release  -- 1 tab(s) by mouth once a day  -- Indication: For CArdiac    Breo Ellipta 100 mcg-25 mcg/inh inhalation powder  -- 1 puff(s) inhaled once a day  -- Indication: For bronchodilator    NIFEdipine 90 mg oral tablet, extended release  -- 1 tab(s) by mouth once a day at 6am  -- Indication: For CArdiac    furosemide 80 mg oral tablet  -- 1 tab(s) by mouth once a day  -- Indication: For Diuretic    Senna 8.6 mg oral tablet  -- 2 tab(s) by mouth once a day (at bedtime)  -- Indication: For laxative    pantoprazole 40 mg oral delayed release tablet  -- 1 tab(s) by mouth once a day  -- Indication: For for stomach    hydrALAZINE 25 mg oral tablet  -- 3 tab(s) by mouth 3 times a day at 6am , 2pm and 10 pm  -- Indication: For CArdiac

## 2018-12-04 NOTE — SWALLOW BEDSIDE ASSESSMENT ADULT - SWALLOW EVAL: RECOMMENDED DIET
NPO, with non-oral nutrition/hydration/medications. This was d/w medicine NP and pt. As per NP, pt to remain on p.o. diet for QOL given current medical status.

## 2018-12-04 NOTE — CONSULT NOTE ADULT - SUBJECTIVE AND OBJECTIVE BOX
Patient seen and evaluated @   Reason for consult:     HPI: 83 year old M pt with PMH noted below p/w SOB. Of note, pt was recently discharged with plan for mitraclip as otupt. However, pt started complaining of worsening SOB and LE edema, which prompted him to be brought back to [facility] with his daughter and son-in-law. Pt noted being compliant with meds but otherwise no new complaints on ROS.    Primary Service HPI:  83M with PMH HTN, HLD, T2DM, CAD s/p 6 stents (last stent 6 years ago), HFpEF (EF 75%, and CKD stage 4, presents with shortness of breath for 4 days. He endorses dyspnea on exertion with minimal ambulation, orthopnea, and worsening lower extremity edema. He has chronic right sided chest pain which radiated to the arm. He denies fevers, chills, productive cough, diarrhea or dysuria. No sick contacts. Patient was recently discharged from [facility] for similar symptoms on 2018 during which inpatient workup of heart failure exacerbation revealed: cardiac catheterization without hemodynamically significant stenosis; MARBIN showed severe mitral regurg with torn chordae, moderate-severe aortic regurgitation and severe pulmonary hypertension. He was medically optimized and evaluated by CT surgery for valve repair which was deferred as patient clinically improved.    In ED, T 98.2, HR 82, BP: 129/52, and 91 % on RA. Labs notable for 9.4 (baseline 8-9s), Cr 2.16 (improved 2.32 on 2018), proBNP 3000, and lactate 1.3. Troponin 36. RVP neg. CXR with hilar congestion. He was given lasix 80 x 1 (02 Dec 2018 19:12)    PMH:   Heart failure with preserved ejection fraction  HTN (hypertension)  CHF (congestive heart failure)  T2DM (type 2 diabetes mellitus)  HLD (hyperlipidemia)    PSH:   CAD S/P percutaneous coronary angioplasty  No significant past surgical history    Medications:   aspirin enteric coated 81 milliGRAM(s) Oral daily  buDESOnide 160 MICROgram(s)/formoterol 4.5 MICROgram(s) Inhaler 2 Puff(s) Inhalation two times a day  clopidogrel Tablet 75 milliGRAM(s) Oral daily  dextrose 40% Gel 15 Gram(s) Oral once PRN  dextrose 5%. 1000 milliLiter(s) IV Continuous <Continuous>  dextrose 50% Injectable 12.5 Gram(s) IV Push once  dextrose 50% Injectable 25 Gram(s) IV Push once  dextrose 50% Injectable 25 Gram(s) IV Push once  doxazosin 2 milliGRAM(s) Oral at bedtime  furosemide   Injectable 40 milliGRAM(s) IV Push every 12 hours  glucagon  Injectable 1 milliGRAM(s) IntraMuscular once PRN  heparin  Injectable 5000 Unit(s) SubCutaneous every 12 hours  hydrALAZINE 50 milliGRAM(s) Oral <User Schedule>  insulin glargine Injectable (LANTUS) 5 Unit(s) SubCutaneous at bedtime  insulin lispro (HumaLOG) corrective regimen sliding scale   SubCutaneous three times a day before meals  insulin lispro (HumaLOG) corrective regimen sliding scale   SubCutaneous at bedtime  metoprolol tartrate 50 milliGRAM(s) Oral <User Schedule>  NIFEdipine XL 60 milliGRAM(s) Oral <User Schedule>  pantoprazole    Tablet 40 milliGRAM(s) Oral before breakfast  potassium chloride    Tablet ER 40 milliEquivalent(s) Oral every 4 hours  predniSONE   Tablet 5 milliGRAM(s) Oral once  senna 2 Tablet(s) Oral at bedtime  sertraline 100 milliGRAM(s) Oral daily  simvastatin 20 milliGRAM(s) Oral at bedtime    Allergies:  No Known Allergies    FAMILY HISTORY:  No pertinent family history in first degree relatives: No hx of CAD in his children    Social History:  Smoking:  Alcohol:  Drugs:    Review of Systems:  Constitutional: [ ] Fever [ ] Chills [x] Fatigue [x] Weight change   HEENT: [ ] Blurred vision [ ] Eye Pain [ ] Headache [ ] Runny nose [ ] Sore Throat   Respiratory: [ ] Cough [ ] Wheezing [x] Shortness of breath  Cardiovascular: [ ] Chest Pain [ ] Palpitations [ ] FLOREZ [ ] PND [ ] Orthopnea  Gastrointestinal: [ ] Abdominal Pain [ ] Diarrhea [ ] Constipation [ ] Hemorrhoids [ ] Nausea [ ] Vomiting  Genitourinary: [ ] Nocturia [ ] Dysuria [ ] Incontinence  Extremities: [x] Swelling [ ] Joint Pain  Neurologic: [ ] Focal deficit [ ] Paresthesias [ ] Syncope  Lymphatic: [ ] Swelling [ ] Lymphadenopathy   Skin: [ ] Rash [ ] Ecchymoses [ ] Wounds [ ] Lesions  Psychiatry: [ ] Depression [ ] Suicidal/Homicidal Ideation [ ] Anxiety [ ] Sleep Disturbances  [ ] 10 point review of systems is otherwise negative except as mentioned above            [ ]Unable to obtain    Physical Exam:  T(C): 36.7 (18 @ 16:37), Max: 37.1 (18 @ 00:04)  HR: 81 (18 @ 16:37) (73 - 82)  BP: 144/63 (18 @ 16:37) (130/63 - 144/63)  RR: 20 (18 @ 16:37) (18 - 20)  SpO2: 93% (18 @ 16:37) (92% - 95%)  Wt(kg): --     @ 07:01  -   @ 07:00  --------------------------------------------------------  IN: 720 mL / OUT: 1950 mL / NET: -1230 mL     @ 07:01  -   @ 17:20  --------------------------------------------------------  IN: 600 mL / OUT: 1100 mL / NET: -500 mL      Daily     Daily Weight in k.6 (04 Dec 2018 12:25)    Appearance: NAD  Eyes: PERRL, EOMI  HENT: Normal oral mucosa, NC/AT  Cardiovascular: normal S1 and S2, RRR, 3/6 systolic murmur, 1+ pitting edema, normal JVP  Respiratory: increased work of breathing on NC, bibasilar crackles, no wheezing  Gastrointestinal: Soft, non-tender, non-distended, BS+  Musculoskeletal: No clubbing, no joint deformity   Neurologic: Non-focal  Lymphatic: No lymphadenopathy  Psychiatry: AAOx3, mood & affect appropriate  Skin: No rashes, no ecchymoses, no cyanosis    Cardiovascular Diagnostic Testing:  ECG:    Echo:    Stress Testing:    Cath:    Interpretation of Telemetry:    Imaging:    Labs:                        8.1    4.85  )-----------( 131      ( 04 Dec 2018 07:58 )             25.7     12-    143  |  101  |  59<H>  ----------------------------<  140<H>  3.2<L>   |  25  |  2.44<H>    Ca    9.2      04 Dec 2018 06:30  Phos  4.9     12-  Mg     2.2         TPro  6.7  /  Alb  3.4  /  TBili  0.6  /  DBili  x   /  AST  11  /  ALT  7<L>  /  AlkPhos  46  12-          Serum Pro-Brain Natriuretic Peptide: 3765 pg/mL ( @ 16:42) 83 year old M p/w SOB.  He was recently discharged with plan for return for Mitraclip vs. valve repair. However, pt started complaining of worsening SOB and LE edema, which prompted him to be brought back to [facility] with his daughter and son-in-law. Pt noted being compliant with meds but otherwise no new complaints on ROS.    HPI:  Hx. of HTN, HLD, T2DM, CAD s/p 6 stents (last stent 6 years ago), HFpEF (EF 75%, and CKD stage 4.  Presents with shortness of breath for 4 days, including dyspnea on exertion with minimal ambulation, orthopnea, and worsening lower extremity edema. He has chronic right sided chest pain which radiated to the arm. He denies fevers, chills, productive cough, diarrhea or dysuria. No sick contacts.   Patient was recently discharged from [facility] for similar symptoms on 2018. . Inpatient workup of heart failure exacerbation revealed: cardiac catheterization without hemodynamically significant stenosis; MARBIN showed severe mitral regurg with torn chordae, moderate-severe aortic regurgitation and severe pulmonary hypertension. He was medically optimized and evaluated by CT surgery for valve repair which was deferred as patient clinically improved.  In ED, T 98.2, HR 82, BP: 129/52, and 91 % on RA. Labs notable for 9.4 (baseline 8-9s), Cr 2.16 (improved 2.32 on 2018), proBNP 3000, and lactate 1.3. Troponin 36. RVP neg. CXR with hilar congestion. He was given lasix 80 x 1 (02 Dec 2018 19:12)    PMH:   Heart failure with preserved ejection fraction  HTN (hypertension)  CHF (congestive heart failure)  T2DM (type 2 diabetes mellitus)  HLD (hyperlipidemia)    PSH:   CAD S/P percutaneous coronary angioplasty  No significant past surgical history    Medications:   aspirin enteric coated 81 milliGRAM(s) Oral daily  buDESOnide 160 MICROgram(s)/formoterol 4.5 MICROgram(s) Inhaler 2 Puff(s) Inhalation two times a day  clopidogrel Tablet 75 milliGRAM(s) Oral daily  dextrose 40% Gel 15 Gram(s) Oral once PRN  dextrose 5%. 1000 milliLiter(s) IV Continuous <Continuous>  dextrose 50% Injectable 12.5 Gram(s) IV Push once  dextrose 50% Injectable 25 Gram(s) IV Push once  dextrose 50% Injectable 25 Gram(s) IV Push once  doxazosin 2 milliGRAM(s) Oral at bedtime  furosemide   Injectable 40 milliGRAM(s) IV Push every 12 hours  glucagon  Injectable 1 milliGRAM(s) IntraMuscular once PRN  heparin  Injectable 5000 Unit(s) SubCutaneous every 12 hours  hydrALAZINE 50 milliGRAM(s) Oral <User Schedule>  insulin glargine Injectable (LANTUS) 5 Unit(s) SubCutaneous at bedtime  insulin lispro (HumaLOG) corrective regimen sliding scale   SubCutaneous three times a day before meals  insulin lispro (HumaLOG) corrective regimen sliding scale   SubCutaneous at bedtime  metoprolol tartrate 50 milliGRAM(s) Oral <User Schedule>  NIFEdipine XL 60 milliGRAM(s) Oral <User Schedule>  pantoprazole    Tablet 40 milliGRAM(s) Oral before breakfast  potassium chloride    Tablet ER 40 milliEquivalent(s) Oral every 4 hours  predniSONE   Tablet 5 milliGRAM(s) Oral once  senna 2 Tablet(s) Oral at bedtime  sertraline 100 milliGRAM(s) Oral daily  simvastatin 20 milliGRAM(s) Oral at bedtime    Allergies:  No Known Allergies    FAMILY HISTORY:  No pertinent family history in first degree relatives: No hx of CAD in his children    Social History:  Smoking: No  Alcohol:  No  Drugs:  No    Review of Systems:  Constitutional: [ ] Fever [ ] Chills [x] Fatigue [x] Weight change   HEENT: [ ] Blurred vision [ ] Eye Pain [ ] Headache [ ] Runny nose [ ] Sore Throat   Respiratory: [ ] Cough [ ] Wheezing [x] Shortness of breath  Cardiovascular: [ ] Chest Pain [ ] Palpitations [ ] FLOREZ [ ] PND [ ] Orthopnea  Gastrointestinal: [ ] Abdominal Pain [ ] Diarrhea [ ] Constipation [ ] Hemorrhoids [ ] Nausea [ ] Vomiting  Genitourinary: [ ] Nocturia [ ] Dysuria [ ] Incontinence  Extremities: [x] Swelling [ ] Joint Pain  Neurologic: [ ] Focal deficit [ ] Paresthesias [ ] Syncope  Lymphatic: [ ] Swelling [ ] Lymphadenopathy   Skin: [ ] Rash [ ] Ecchymoses [ ] Wounds [ ] Lesions  Psychiatry: [ ] Depression [ ] Suicidal/Homicidal Ideation [ ] Anxiety [ ] Sleep Disturbances  [ ] 10 point review of systems is otherwise negative except as mentioned above            [ ]Unable to obtain    Physical Exam:  T(C): 36.7 (18 @ 16:37), Max: 37.1 (18 @ 00:04)  HR: 81 (18 @ 16:37) (73 - 82)  BP: 144/63 (18 @ 16:37) (130/63 - 144/63)  RR: 20 (18 @ 16:37) (18 - 20)  SpO2: 93% (18 @ 16:37) (92% - 95%)  Daily Weight in k.6 (04 Dec 2018 12:25)    Appearance: NAD  Eyes: PERRL, EOMI  HENT: Normal oral mucosa, NC/AT  Cardiovascular: normal S1 and S2, RRR, 3/6 systolic murmur, 1+ pitting edema, normal JVP  Respiratory: increased work of breathing on NC, bibasilar crackles, no wheezing  Gastrointestinal: Soft, non-tender, non-distended, BS+  Musculoskeletal: No clubbing, no joint deformity   Neurologic: Non-focal  Lymphatic: No lymphadenopathy  Psychiatry: AAOx3, mood & affect appropriate  Skin: No rashes, no ecchymoses, no cyanosis    I&O:   @ 07:01  -   @ 07:00  --------------------------------------------------------  IN: 720 mL / OUT: 1950 mL / NET: -1230 mL     @ 07:01  -   @ 17:20  --------------------------------------------------------  IN: 600 mL / OUT: 1100 mL / NET: -500 mL      12 Lead ECG (18 @ 13:25)   NSR at  68 BPM   P-R Interval 146 ms, QRS Duration 104 ms, Q-T Interval 404 ms   Axis -10 degrees   NON-SPECIFIC ST AND T WAVE       Labs:                      8.1    4.85  )-----------( 131      ( 04 Dec 2018 07:58 )             25.7     12-  143  |  101  |  59<H>  ----------------------------<  140<H>  3.2<L>   |  25  |  2.44<H>    Ca    9.2      04 Dec 2018 06:30  Phos  4.9     12-  Mg     2.2     -    TPro  6.7  /  Alb  3.4  /  TBili  0.6  /  DBili  x   /  AST  11  /  ALT  7<L>  /  AlkPhos  46  12-    Serum Pro-Brain Natriuretic Peptide: 3765 pg/mL ( @ 16:42)

## 2018-12-04 NOTE — DISCHARGE NOTE ADULT - CARE PROVIDERS DIRECT ADDRESSES
[email or web address],[email or web address],DirectAddress_Unknown

## 2018-12-04 NOTE — PROGRESS NOTE ADULT - ASSESSMENT
The patient is an 83-year-old man with PMH HTN, HLD, Type 2 DM, CAD s/p 6 stents (last stent 6 years ago), HFpEF (EF 75%), and CKD stage 4, presented with shortness of breath for 4 days, found to have pulmonary edema concerning for decompensated heart failure in setting of mitral valve insufficiency.  Recently admitted, found to have severe MR with torn chordae.  Seen by structural heart team and CT surgery last admission, plan was for outpatient follow-up for Mitraclip.

## 2018-12-04 NOTE — PROGRESS NOTE ADULT - PROBLEM SELECTOR PLAN 2
Hemoglobin A1C 4.7 in November.  Appeared during prior admission to have steroid induced hyperglycemia.  When patient first presented in early November, was taking Lantus 8units qhs while not on steroids.   Continue Lantus 5 units qhs.  Continue low dose sliding scale coverage.  Completing Prednisone taper, last dose today.  Monitor blood sugars and titrate down Lantus dose if appropriate.

## 2018-12-05 LAB
ANION GAP SERPL CALC-SCNC: 15 MMOL/L — SIGNIFICANT CHANGE UP (ref 5–17)
BUN SERPL-MCNC: 61 MG/DL — HIGH (ref 7–23)
CALCIUM SERPL-MCNC: 9.1 MG/DL — SIGNIFICANT CHANGE UP (ref 8.4–10.5)
CHLORIDE SERPL-SCNC: 106 MMOL/L — SIGNIFICANT CHANGE UP (ref 96–108)
CO2 SERPL-SCNC: 24 MMOL/L — SIGNIFICANT CHANGE UP (ref 22–31)
CREAT SERPL-MCNC: 2.39 MG/DL — HIGH (ref 0.5–1.3)
GLUCOSE BLDC GLUCOMTR-MCNC: 161 MG/DL — HIGH (ref 70–99)
GLUCOSE BLDC GLUCOMTR-MCNC: 179 MG/DL — HIGH (ref 70–99)
GLUCOSE BLDC GLUCOMTR-MCNC: 210 MG/DL — HIGH (ref 70–99)
GLUCOSE BLDC GLUCOMTR-MCNC: 214 MG/DL — HIGH (ref 70–99)
GLUCOSE SERPL-MCNC: 134 MG/DL — HIGH (ref 70–99)
HCT VFR BLD CALC: 24.9 % — LOW (ref 39–50)
HGB BLD-MCNC: 7.7 G/DL — LOW (ref 13–17)
MAGNESIUM SERPL-MCNC: 2.4 MG/DL — SIGNIFICANT CHANGE UP (ref 1.6–2.6)
MCHC RBC-ENTMCNC: 27.4 PG — SIGNIFICANT CHANGE UP (ref 27–34)
MCHC RBC-ENTMCNC: 30.9 GM/DL — LOW (ref 32–36)
MCV RBC AUTO: 88.6 FL — SIGNIFICANT CHANGE UP (ref 80–100)
PLATELET # BLD AUTO: 125 K/UL — LOW (ref 150–400)
POTASSIUM SERPL-MCNC: 3.7 MMOL/L — SIGNIFICANT CHANGE UP (ref 3.5–5.3)
POTASSIUM SERPL-SCNC: 3.7 MMOL/L — SIGNIFICANT CHANGE UP (ref 3.5–5.3)
RBC # BLD: 2.81 M/UL — LOW (ref 4.2–5.8)
RBC # FLD: 15.5 % — HIGH (ref 10.3–14.5)
SODIUM SERPL-SCNC: 145 MMOL/L — SIGNIFICANT CHANGE UP (ref 135–145)
WBC # BLD: 5.97 K/UL — SIGNIFICANT CHANGE UP (ref 3.8–10.5)
WBC # FLD AUTO: 5.97 K/UL — SIGNIFICANT CHANGE UP (ref 3.8–10.5)

## 2018-12-05 PROCEDURE — 74230 X-RAY XM SWLNG FUNCJ C+: CPT | Mod: 26

## 2018-12-05 PROCEDURE — 99223 1ST HOSP IP/OBS HIGH 75: CPT | Mod: GC

## 2018-12-05 PROCEDURE — 99233 SBSQ HOSP IP/OBS HIGH 50: CPT

## 2018-12-05 RX ORDER — ACETAMINOPHEN 500 MG
650 TABLET ORAL EVERY 6 HOURS
Qty: 0 | Refills: 0 | Status: DISCONTINUED | OUTPATIENT
Start: 2018-12-05 | End: 2018-12-11

## 2018-12-05 RX ADMIN — Medication 1000 MILLIGRAM(S): at 00:52

## 2018-12-05 RX ADMIN — Medication 60 MILLIGRAM(S): at 18:07

## 2018-12-05 RX ADMIN — Medication 60 MILLIGRAM(S): at 05:36

## 2018-12-05 RX ADMIN — Medication 50 MILLIGRAM(S): at 20:52

## 2018-12-05 RX ADMIN — Medication 650 MILLIGRAM(S): at 19:06

## 2018-12-05 RX ADMIN — CLOPIDOGREL BISULFATE 75 MILLIGRAM(S): 75 TABLET, FILM COATED ORAL at 12:27

## 2018-12-05 RX ADMIN — SENNA PLUS 2 TABLET(S): 8.6 TABLET ORAL at 21:31

## 2018-12-05 RX ADMIN — Medication 50 MILLIGRAM(S): at 08:51

## 2018-12-05 RX ADMIN — HEPARIN SODIUM 5000 UNIT(S): 5000 INJECTION INTRAVENOUS; SUBCUTANEOUS at 17:46

## 2018-12-05 RX ADMIN — Medication 1: at 08:48

## 2018-12-05 RX ADMIN — SIMVASTATIN 20 MILLIGRAM(S): 20 TABLET, FILM COATED ORAL at 21:31

## 2018-12-05 RX ADMIN — Medication 2: at 12:31

## 2018-12-05 RX ADMIN — Medication 40 MILLIGRAM(S): at 17:53

## 2018-12-05 RX ADMIN — PANTOPRAZOLE SODIUM 40 MILLIGRAM(S): 20 TABLET, DELAYED RELEASE ORAL at 05:36

## 2018-12-05 RX ADMIN — Medication 2 MILLIGRAM(S): at 21:31

## 2018-12-05 RX ADMIN — INSULIN GLARGINE 5 UNIT(S): 100 INJECTION, SOLUTION SUBCUTANEOUS at 22:46

## 2018-12-05 RX ADMIN — BUDESONIDE AND FORMOTEROL FUMARATE DIHYDRATE 2 PUFF(S): 160; 4.5 AEROSOL RESPIRATORY (INHALATION) at 05:36

## 2018-12-05 RX ADMIN — Medication 50 MILLIGRAM(S): at 05:30

## 2018-12-05 RX ADMIN — Medication 81 MILLIGRAM(S): at 12:27

## 2018-12-05 RX ADMIN — SERTRALINE 100 MILLIGRAM(S): 25 TABLET, FILM COATED ORAL at 12:27

## 2018-12-05 RX ADMIN — Medication 1: at 17:50

## 2018-12-05 RX ADMIN — Medication 50 MILLIGRAM(S): at 14:54

## 2018-12-05 RX ADMIN — Medication 650 MILLIGRAM(S): at 18:36

## 2018-12-05 RX ADMIN — Medication 40 MILLIGRAM(S): at 05:30

## 2018-12-05 RX ADMIN — BUDESONIDE AND FORMOTEROL FUMARATE DIHYDRATE 2 PUFF(S): 160; 4.5 AEROSOL RESPIRATORY (INHALATION) at 17:45

## 2018-12-05 RX ADMIN — HEPARIN SODIUM 5000 UNIT(S): 5000 INJECTION INTRAVENOUS; SUBCUTANEOUS at 05:30

## 2018-12-05 NOTE — SWALLOW VFSS/MBS ASSESSMENT ADULT - RECOMMENDED CONSISTENCY
Based upon limited exam: Dysphagia I diet with honey thick fluids. Crush meds of provide via alternate source. MD/team Please enter the following as provider to RN orders : 1) Full assist with meals, 2) Crush meds or provide via alternate source, 3) Provide small single bites and sips at slow rate 4) Encourage successive swallows for oral and pharyngeal clearance 5) Alternate food and liquids to aid in oral and pharyngeal clearance 6) Aspiration precautions. Monitor for s/s aspiration/laryngeal penetration. If noted:  D/C p.o. intake, provide non-oral nutrition/hydration/meds

## 2018-12-05 NOTE — PROGRESS NOTE ADULT - PROBLEM SELECTOR PLAN 1
Patient presenting with worsening dyspnea with pulmonary edema secondary to acute on chronic HFpEF in the setting of known severe MR as well as moderate to severe aortic regurgitation with severe pulm HTN.  Leg edema improved with IV Lasix and now without crackles on exam, but still reports orthopnea.  Per Dr. Bazzi who spoke with CT surgery, patient is not a candidate for an intervention on the mitral valve.  Continue supplemental oxygen, IV Lasix BID.  Will request palliative care consult after discussion with Dr. Bazzi, patient, daughter, and son-in-law at bedside.

## 2018-12-05 NOTE — SWALLOW VFSS/MBS ASSESSMENT ADULT - CONSISTENCIES ADMINISTERED
puree thin puree thick small single sip/honey thick mech soft small single sip/nectar thick single sip (small)/thin liquid

## 2018-12-05 NOTE — PROGRESS NOTE ADULT - PROBLEM SELECTOR PLAN 2
Hemoglobin A1C 4.7 in November.  Appeared during prior admission to have steroid induced hyperglycemia.  When patient first presented in early November, was taking Lantus 8units qhs while not on steroids.   Continue Lantus 5 units qhs.  Continue low dose sliding scale coverage.  Completing Prednisone taper, last dose yesterday.  Monitor blood sugars and titrate down Lantus dose if appropriate.

## 2018-12-05 NOTE — SWALLOW VFSS/MBS ASSESSMENT ADULT - ADDITIONAL INFORMATION
Calcification of thyroid cartilage. Small anterior cervical osteophyte was noted. Calcification of thyroid cartilage. Small anterior cervical osteophyte was noted.  Disorders:  reduced lingual strength/ROM/Rate of motion, reduced BOT to posterior pharyngeal wall contact with a narrow column of contrast between structures, delay in trigger of the swallow reflex with low trigger points, reduced anterior hyoid excursion, reduced laryngeal elevation, incomplete laryngeal vestibule closure, evidence of reduced velar to posterior pharyngeal wall contact, reduced supraglottic sensation, reduced subglottic sensation.    Strategies: pt poorly complaint for testing therefore strategies (except chin tuck and intake of controlled volumes) could not be utilized. Neither of aforementioned strategies improved a/w protection for nectar thick fluids. Intake of controlled volumes also did not eliminate aspiration of thin liquids.

## 2018-12-05 NOTE — SWALLOW VFSS/MBS ASSESSMENT ADULT - MODE OF PRESENTATION
spoon/fed by clinician fed by clinician/spoon self fed/cup fed by clinician/1/2 tp cup straw/self fed/cup

## 2018-12-05 NOTE — SWALLOW VFSS/MBS ASSESSMENT ADULT - DIAGNOSTIC IMPRESSIONS
Limited exam secondary to very poor patient compliance. Patient presents with jose-pharyngeal dysphagia with impaired oral management, delay in trigger of the swallow reflex, post swallow oral and pharyngeal residue, impaired airway protection with silent aspiration of thin liquids and silent laryngeal penetration of nectar thick fluids with aspiration risk for same.

## 2018-12-05 NOTE — SWALLOW VFSS/MBS ASSESSMENT ADULT - ORAL PHASE
Delayed oral transit time/Reduced anterior - posterior transport/Incomplete tongue to palate contact/Residue in oral cavity Delayed oral transit time/Residue in oral cavity/Incomplete tongue to palate contact/Reduced anterior - posterior transport Residue in oral cavity/Delayed oral transit time/Reduced anterior - posterior transport/Incomplete tongue to palate contact/Laryngeal penetration before swallow - silent/Uncontrolled bolus / spillover in hypopharynx/Uncontrolled bolus / spillover in jose-pharynx Delayed oral transit time/Residue in oral cavity/Uncontrolled bolus / spillover in jose-pharynx/Uncontrolled bolus / spillover in hypopharynx/Reduced anterior - posterior transport/Incomplete tongue to palate contact/Laryngeal penetration before swallow - silent

## 2018-12-05 NOTE — SWALLOW VFSS/MBS ASSESSMENT ADULT - SLP GENERAL OBSERVATIONS
Limited exam - pt poorly complaint for testing- abusive toward staff (team/RN alerted) Limited exam - pt poorly complaint for testing- abusive toward clinician performing exam (team/RN alerted)

## 2018-12-05 NOTE — CHART NOTE - NSCHARTNOTEFT_GEN_A_CORE
follow up- spoke to Roberta ENCARNACION from heart structure team and advised to speak with  CTS and spoke to Elva Bella NP regarding follow up on pt cardiac surgery status;  per NP waiting for attending MD, Dr. Stroud to make decision ; follow up by CTS.  Preethi Reynolds(NP)  [facility], [phone]

## 2018-12-05 NOTE — PROGRESS NOTE ADULT - SUBJECTIVE AND OBJECTIVE BOX
HPI:  83M hx HTN, HLD, T2DM, CAD s/p 6 stents (last stent 6 years ago), HFpEF (EF 75%, and CKD stage 4.  Presents with shortness of breath for 4 days, with FLOREZ with minimal ambulation, orthopnea, and worsening lower extremity edema.   Patient was recently discharged from [facility] for similar symptoms on 2018.  Inpatient workup revealed heart failure exacerbation.  Cardiac catheterization without hemodynamically significant stenosis; MARBIN showed severe mitral regurg with torn chordae, moderate-severe aortic regurgitation and severe pulmonary hypertension.   He was medically optimized and evaluated by CT surgery for valve repair; latter deferred as patient clinically improved.      Medications:  acetaminophen   Tablet .. 650 milliGRAM(s) Oral every 6 hours PRN  aspirin enteric coated 81 milliGRAM(s) Oral daily  buDESOnide 160 MICROgram(s)/formoterol 4.5 MICROgram(s) Inhaler 2 Puff(s) Inhalation two times a day  clopidogrel Tablet 75 milliGRAM(s) Oral daily  dextrose 40% Gel 15 Gram(s) Oral once PRN  dextrose 5%. 1000 milliLiter(s) IV Continuous <Continuous>  dextrose 50% Injectable 12.5 Gram(s) IV Push once  dextrose 50% Injectable 25 Gram(s) IV Push once  dextrose 50% Injectable 25 Gram(s) IV Push once  doxazosin 2 milliGRAM(s) Oral at bedtime  furosemide   Injectable 40 milliGRAM(s) IV Push every 12 hours  glucagon  Injectable 1 milliGRAM(s) IntraMuscular once PRN  heparin  Injectable 5000 Unit(s) SubCutaneous every 12 hours  hydrALAZINE 50 milliGRAM(s) Oral <User Schedule>  insulin glargine Injectable (LANTUS) 5 Unit(s) SubCutaneous at bedtime  insulin lispro (HumaLOG) corrective regimen sliding scale   SubCutaneous three times a day before meals  insulin lispro (HumaLOG) corrective regimen sliding scale   SubCutaneous at bedtime  metoprolol tartrate 50 milliGRAM(s) Oral <User Schedule>  NIFEdipine XL 60 milliGRAM(s) Oral <User Schedule>  pantoprazole    Tablet 40 milliGRAM(s) Oral before breakfast  senna 2 Tablet(s) Oral at bedtime  sertraline 100 milliGRAM(s) Oral daily  simvastatin 20 milliGRAM(s) Oral at bedtime      PMH/PSH/FH/SH: [ ] Unchanged      ROS:  Unchanged      Vitals:  T(C): 37 (12-05-18 @ 08:35), Max: 37 (18 @ 08:35)  HR: 82 (18 @ 08:35) (70 - 85)  BP: 152/56 (18 @ 08:35) (141/59 - 152/56)  RR: 18 (18 @ 08:35) (18 - 20)  SpO2: 99% (18 @ 08:35) (93% - 99%)  Daily Weight in k.9 (05 Dec 2018 04:23)        Appearance: NAD  Eyes: PERRL, EOMI  HENT: Normal oral mucosa, NC/AT  Cardiovascular: normal S1 and S2, RRR, 3/6 systolic murmur, 1+ pitting edema, normal JVP  Respiratory: increased work of breathing on NC, bibasilar crackles, no wheezing  Gastrointestinal: Soft, non-tender, non-distended, BS+  Musculoskeletal: No clubbing, no joint deformity   Neurologic: Non-focal  Lymphatic: No lymphadenopathy  Psychiatry: AAOx3, mood & affect appropriate  Skin: No rashes, no ecchymoses, no cyanosis        I&O's Summary  04 Dec 2018 07:  -  05 Dec 2018 07:00  --------------------------------------------------------  IN: 1190 mL / OUT: 1800 mL / NET: -610 mL    05 Dec 2018 07:  -  05 Dec 2018 13:11  --------------------------------------------------------  IN: 0 mL / OUT: 300 mL / NET: -300 mL      145  |  106  |  61<H>  ----------------------------<  134<H>  3.7   |  24  |  2.39<H>    Ca    9.1      05 Dec 2018 07:08  Mg     2.4     12    Serum Pro-Brain Natriuretic Peptide: 3765 pg/mL (18 @ 16:42)      Transesophageal Echocardiogram (18 @ 09:08) >  Patient name: JANE VILLEDA  YOB: 1935   Age: 83 (M)   MR#: [id number]  Study Date: 2018  Location: [address] [place], [place] [id number]: Ana Arreaga M.D.  Study quality: Technically fair  ------------------------------------------------------------------------  Observations:  Mitral Valve: Mitral annular calcification, mildly thickened leaflets . There is a torn chordae seen associated with the lateral aspect of the posterior leaflet  Severe eccentric mitral regurgitation. Late systolic reversal noted in the pulmonary veins.  Aortic Valve/Aorta: Calcified trileaflet aortic valve with decreased opening. Estimated aortic valve area equals 1.8 sqcm (by continuity equation), consistent with mild aortic stenosis. Moderate-severe aortic regurgitation.  Mild atheroma noted in aortic arch/descending aorta.   Left Atrium: Severe left atrial enlargement.  No left atrial or left atrial appendage thrombus.  Left Ventricle: Normal left ventricular systolic function. Normal left ventricular internal dimensions and wall thickness.  Right Heart: Normal right atrium. Normal right ventricular size and function. Normal tricuspid valve. Moderate tricuspid regurgitation. Normal pulmonic valve. Moderate pulmonic regurgitation.  Pericardium/Pleura: Normal pericardium with no pericardial effusion.  Hemodynamic: Estimated right atrial pressure is 8 mm Hg. Estimated right ventricular systolic pressure equals 69 mmHg, assuming right atrial pressure equals 8 mm Hg, consistent with severe pulmonary hypertension. Contrast injection demonstrates no evidence of a patent foramen ovale.  ------------------------------------------------------------------------  Conclusions:  1. Mitral annular calcification, mildly thickened leaflets . There is a torn chordae seen associated with the lateral aspect of the posterior leaflet  Severe eccentric mitral regurgitation. Late systolic reversal noted in the pulmonary veins.  2. Calcified trileaflet aortic valve with decreased opening. Estimated aortic valve area equals 1.8 sqcm (by continuity equation), consistent with mild aortic stenosis. Moderate-severe aortic regurgitation.   3. Severe left atrial enlargement.  No left atrial or left atrial appendage thrombus.  4. Normal left ventricular systolic function.   5. Normal right ventricular size and function.  6. Normal tricuspid valve. Moderate tricuspid regurgitation.  7. Normal pulmonic valve. Moderate pulmonic regurgitation. Estimated pulmonary artery systolic pressure equals 69  mm Hg, assuming right atrial pressure equals 8 mm Hg, consistent with severe pulmonary pressures. ------------------------------------------------------------------------  Confirmed on  2018 - 14:46:12 by Jammie Randolph M.D.  ------------------------------------------------------------------------        Assessment		  A/P: 83 year old M pt with PMH of HTN, HLD, T2DM, CAD s/p multiple PCIs, HFpEF, severe MR with rupture chordae, and CKD.  P/W worsening SOB.      - clinically stable with stable SOB and improving LE edema  - c/w 40mg IV Lasix bid, monitor i/o's and daily weights  - c/w medical therapy with afterload reduction  - structural and ct surgery on board  - discuss regarding plan moving forward, possible surgical double valve replacement      Rodolfo Brown, #[id number]  Cardiology Fellow    Brendan Bazzi M.D.  Cardiology Consult Service  [phone] or [phone] Sitting up in bed, comfortable at rest with O2 via nasal cannula.  Short of breath after walking 10 steps.      HPI:  83M hx HTN, HLD, T2DM, CAD s/p 6 stents (last stent 6 years ago), CHF and CKD stage 4.  Presents with shortness of breath for 4 days, with FLOREZ with minimal ambulation, orthopnea, and worsening lower extremity edema.   Patient was recently discharged from [facility] for similar symptoms on 2018.  Inpatient workup revealed heart failure exacerbation.  Cardiac catheterization without hemodynamically significant stenosis; MARBIN showed severe mitral regurg with torn chordae, moderate-severe aortic regurgitation and severe pulmonary hypertension.   He was medically optimized and evaluated by CT surgery for valve repair; latter deferred as patient clinically improved.      Medications:  acetaminophen   Tablet .. 650 milliGRAM(s) Oral every 6 hours PRN  aspirin enteric coated 81 milliGRAM(s) Oral daily  buDESOnide 160 MICROgram(s)/formoterol 4.5 MICROgram(s) Inhaler 2 Puff(s) Inhalation two times a day  clopidogrel Tablet 75 milliGRAM(s) Oral daily  dextrose 40% Gel 15 Gram(s) Oral once PRN  dextrose 5%. 1000 milliLiter(s) IV Continuous <Continuous>  dextrose 50% Injectable 12.5 Gram(s) IV Push once  dextrose 50% Injectable 25 Gram(s) IV Push once  dextrose 50% Injectable 25 Gram(s) IV Push once  doxazosin 2 milliGRAM(s) Oral at bedtime  furosemide   Injectable 40 milliGRAM(s) IV Push every 12 hours  glucagon  Injectable 1 milliGRAM(s) IntraMuscular once PRN  heparin  Injectable 5000 Unit(s) SubCutaneous every 12 hours  hydrALAZINE 50 milliGRAM(s) Oral <User Schedule>  insulin glargine Injectable (LANTUS) 5 Unit(s) SubCutaneous at bedtime  insulin lispro (HumaLOG) corrective regimen sliding scale   SubCutaneous three times a day before meals  insulin lispro (HumaLOG) corrective regimen sliding scale   SubCutaneous at bedtime  metoprolol tartrate 50 milliGRAM(s) Oral <User Schedule>  NIFEdipine XL 60 milliGRAM(s) Oral <User Schedule>  pantoprazole    Tablet 40 milliGRAM(s) Oral before breakfast  senna 2 Tablet(s) Oral at bedtime  sertraline 100 milliGRAM(s) Oral daily  simvastatin 20 milliGRAM(s) Oral at bedtime      PMH/PSH/FH/SH: [ ] Unchanged      ROS:  Unchanged      Vitals:  T(C): 37 (18 @ 08:35), Max: 37 (18 @ 08:35)  HR: 82 (18 @ 08:35) (70 - 85)  BP: 152/56 (18 @ 08:35) (141/59 - 152/56)  RR: 18 (18 @ 08:35) (18 - 20)  SpO2: 99% (18 @ 08:35) (93% - 99%)  Daily Weight in k.9 (05 Dec 2018 04:23)        Appearance: NAD  Eyes: PERRL, EOMI  HENT: Normal oral mucosa, NC/AT  Cardiovascular: normal S1 and S2, RRR, 3/6 systolic murmur, 1+ pitting edema, normal JVP  Respiratory: increased work of breathing on NC, bibasilar crackles, no wheezing  Gastrointestinal: Soft, non-tender, non-distended, BS+  Musculoskeletal: No clubbing, no joint deformity   Neurologic: Non-focal  Lymphatic: No lymphadenopathy  Psychiatry: AAOx3, mood & affect appropriate  Skin: No rashes, no ecchymoses, no cyanosis        I&O's Summary  04 Dec 2018 07:  -  05 Dec 2018 07:00  --------------------------------------------------------  IN: 1190 mL / OUT: 1800 mL / NET: -610 mL    05 Dec 2018 07:01  -  05 Dec 2018 13:11  --------------------------------------------------------  IN: 0 mL / OUT: 300 mL / NET: -300 mL      145  |  106  |  61<H>  ----------------------------<  134<H>  3.7   |  24  |  2.39<H>    Ca    9.1      05 Dec 2018 07:08  Mg     2.4         Serum Pro-Brain Natriuretic Peptide: 3765 pg/mL (18 @ 16:42)      Transesophageal Echocardiogram (18 @ 09:08) >  Patient name: JANE VILLEDA  YOB: 1935   Age: 83 (M)   MR#: [id number]  Study Date: 2018  Location: [address] [place], [place] [id number]: Ana Arreaga M.D.  Study quality: Technically fair  ------------------------------------------------------------------------  Observations:  Mitral Valve: Mitral annular calcification, mildly thickened leaflets . There is a torn chordae seen associated with the lateral aspect of the posterior leaflet  Severe eccentric mitral regurgitation. Late systolic reversal noted in the pulmonary veins.  Aortic Valve/Aorta: Calcified trileaflet aortic valve with decreased opening. Estimated aortic valve area equals 1.8 sqcm (by continuity equation), consistent with mild aortic stenosis. Moderate-severe aortic regurgitation.  Mild atheroma noted in aortic arch/descending aorta.   Left Atrium: Severe left atrial enlargement.  No left atrial or left atrial appendage thrombus.  Left Ventricle: Normal left ventricular systolic function. Normal left ventricular internal dimensions and wall thickness.  Right Heart: Normal right atrium. Normal right ventricular size and function. Normal tricuspid valve. Moderate tricuspid regurgitation. Normal pulmonic valve. Moderate pulmonic regurgitation.  Pericardium/Pleura: Normal pericardium with no pericardial effusion.  Hemodynamic: Estimated right atrial pressure is 8 mm Hg. Estimated right ventricular systolic pressure equals 69 mmHg, assuming right atrial pressure equals 8 mm Hg, consistent with severe pulmonary hypertension. Contrast injection demonstrates no evidence of a patent foramen ovale.  ------------------------------------------------------------------------  Conclusions:  1. Mitral annular calcification, mildly thickened leaflets . There is a torn chordae seen associated with the lateral aspect of the posterior leaflet  Severe eccentric mitral regurgitation. Late systolic reversal noted in the pulmonary veins.  2. Calcified trileaflet aortic valve with decreased opening. Estimated aortic valve area equals 1.8 sqcm (by continuity equation), consistent with mild aortic stenosis. Moderate-severe aortic regurgitation.   3. Severe left atrial enlargement.  No left atrial or left atrial appendage thrombus.  4. Normal left ventricular systolic function.   5. Normal right ventricular size and function.  6. Normal tricuspid valve. Moderate tricuspid regurgitation.  7. Normal pulmonic valve. Moderate pulmonic regurgitation. Estimated pulmonary artery systolic pressure equals 69  mm Hg, assuming right atrial pressure equals 8 mm Hg, consistent with severe pulmonary pressures. ------------------------------------------------------------------------  Confirmed on  2018 - 14:46:12 by Jammie Randolph M.D.  ------------------------------------------------------------------------        Assessment		  A/P: 83 year old M pt with PMH of HTN, HLD, T2DM, CAD s/p multiple PCIs, CHF severe MR with rupture chordae and moderate to severe AI, and CKD.  P/W worsening SOB.      1.  CHF, improving  - c/w 40mg IV Lasix bid, monitor i/o's and daily weights  - c/w afterload reduction with hydralazine  - discussed with Dr. Stroud; patient not a surgical candidate given his debility and co-morbidities.  - Dr. Ramirez spoke to patient's daughter; not a candidate for Mitraclip due to significant AI    2.  CAD, s/p PCI  - continue ASA and simvastatin    3.  HTN  - BP is reasonable; could increase hydralazine dose if necessary.    4.  HLD  - on statin      Discussed also with Dr. Best.  Agree with plan for palliative care consult.      Rodolfo Brown, #[id number]  Cardiology Fellow    Brendan Bazzi M.D.  Cardiology Consult Service  [phone] or [phone]

## 2018-12-05 NOTE — PROGRESS NOTE ADULT - PROBLEM SELECTOR PLAN 7
Reports intermittent dysphagia with liquids.  Case d/w speech pathologist.  Plan for MBS  Continue nectar thick liquids pending MBS.

## 2018-12-05 NOTE — PROGRESS NOTE ADULT - SUBJECTIVE AND OBJECTIVE BOX
SUBJECTIVE:  Seen with Dr. Bazzi.  Sitting up on side of bed.  Feeling better today.  Still with orthopnea.  No SOB on NC sitting up.  No N/V.  NAD.    MEDICATIONS  (STANDING):  aspirin enteric coated 81 milliGRAM(s) Oral daily  buDESOnide 160 MICROgram(s)/formoterol 4.5 MICROgram(s) Inhaler 2 Puff(s) Inhalation two times a day  clopidogrel Tablet 75 milliGRAM(s) Oral daily  dextrose 5%. 1000 milliLiter(s) (50 mL/Hr) IV Continuous <Continuous>  dextrose 50% Injectable 12.5 Gram(s) IV Push once  dextrose 50% Injectable 25 Gram(s) IV Push once  dextrose 50% Injectable 25 Gram(s) IV Push once  doxazosin 2 milliGRAM(s) Oral at bedtime  furosemide   Injectable 40 milliGRAM(s) IV Push every 12 hours  heparin  Injectable 5000 Unit(s) SubCutaneous every 12 hours  hydrALAZINE 50 milliGRAM(s) Oral <User Schedule>  insulin glargine Injectable (LANTUS) 5 Unit(s) SubCutaneous at bedtime  insulin lispro (HumaLOG) corrective regimen sliding scale   SubCutaneous three times a day before meals  insulin lispro (HumaLOG) corrective regimen sliding scale   SubCutaneous at bedtime  metoprolol tartrate 50 milliGRAM(s) Oral <User Schedule>  NIFEdipine XL 60 milliGRAM(s) Oral <User Schedule>  pantoprazole    Tablet 40 milliGRAM(s) Oral before breakfast  senna 2 Tablet(s) Oral at bedtime  sertraline 100 milliGRAM(s) Oral daily  simvastatin 20 milliGRAM(s) Oral at bedtime    MEDICATIONS  (PRN):  acetaminophen   Tablet .. 650 milliGRAM(s) Oral every 6 hours PRN Moderate Pain (4 - 6)  dextrose 40% Gel 15 Gram(s) Oral once PRN Blood Glucose LESS THAN 70 milliGRAM(s)/deciliter  glucagon  Injectable 1 milliGRAM(s) IntraMuscular once PRN Glucose LESS THAN 70 milligrams/deciliter      Vital Signs Last 24 Hrs  T(C): 37 (05 Dec 2018 08:35), Max: 37 (05 Dec 2018 08:35)  T(F): 98.6 (05 Dec 2018 08:35), Max: 98.6 (05 Dec 2018 08:35)  HR: 82 (05 Dec 2018 08:35) (70 - 85)  BP: 152/56 (05 Dec 2018 08:35) (141/59 - 152/56)  BP(mean): --  RR: 18 (05 Dec 2018 08:35) (18 - 20)  SpO2: 99% (05 Dec 2018 08:35) (93% - 99%)    CAPILLARY BLOOD GLUCOSE      POCT Blood Glucose.: 214 mg/dL (05 Dec 2018 12:25)  POCT Blood Glucose.: 161 mg/dL (05 Dec 2018 08:24)  POCT Blood Glucose.: 201 mg/dL (04 Dec 2018 22:06)  POCT Blood Glucose.: 173 mg/dL (04 Dec 2018 17:26)    I&O's Summary    04 Dec 2018 07:01  -  05 Dec 2018 07:00  --------------------------------------------------------  IN: 1190 mL / OUT: 1800 mL / NET: -610 mL    05 Dec 2018 07:01  -  05 Dec 2018 13:50  --------------------------------------------------------  IN: 0 mL / OUT: 300 mL / NET: -300 mL        PHYSICAL EXAM:  GENERAL: Looks stated age, NAD  CARDIOVASCULAR: JELLY best heard at apex.  PULMONARY: Lungs clear to auscultation bilaterally. No wheezes/rales/rhonchi  GI: Abdomen soft, Nontender. Bowel sounds present  MSK/Ext:  No leg edema.  No calf tenderness bilaterally  PSYCH: Normal Affect.      LABS:                        7.7    5.97  )-----------( 125      ( 05 Dec 2018 08:15 )             24.9     12-05    145  |  106  |  61<H>  ----------------------------<  134<H>  3.7   |  24  |  2.39<H>    Ca    9.1      05 Dec 2018 07:08  Mg     2.4     12-05                  RADIOLOGY & ADDITIONAL TESTS:

## 2018-12-05 NOTE — SWALLOW VFSS/MBS ASSESSMENT ADULT - ORAL PHASE COMMENTS
Oral transit time noted to be 2.6  seconds. Oral transit time noted to be 2.6  seconds. Mild to moderate spillover to the valleculae Oral transit time noted to be 4.2  seconds. Moderate spillover along the BOT. Trace oral residue Oral transit time noted to be 3.3  seconds. Oral transit time noted to be 4.2  seconds. Mild to moderate spillover to the level of the valleculae. Trace oral residue. Trace silent penetration prior to swallow trigger. Residue was eventually noted at the level of the vocal folds. Maximal spillover to the level of the valleculae and moderate passive overflow into the pyriform sinuses. Trace silent laryngeal penetration was noted over the laryngeal surface of the a-e folds, prior to swallow trigger. Trace to minimal oral residue was evident. A majority of oral prep and transit took place off fluoro to limit exposure. There pt also moved out of the FOV, refused to continue swallowing, took partially masticated solids out of oral cavity and threw it at the equipment/toward this clinician, screaming.

## 2018-12-05 NOTE — CHART NOTE - NSCHARTNOTEFT_GEN_A_CORE
Discussed with Pt and family about speech and swallow evaluation results. It is recommended that diet to be change to dysphagia 1 with HTL with special instructions. Patient and family refuses diet change. They requested to keep Soft diet with nectar thickened liquid. Explained risks such as aspiration, infection, or even death. Pt and family verbalizes understanding of risks and wishes to proceed with soft diet.  D/w Dr. Best.

## 2018-12-06 DIAGNOSIS — E87.6 HYPOKALEMIA: ICD-10-CM

## 2018-12-06 DIAGNOSIS — R13.10 DYSPHAGIA, UNSPECIFIED: ICD-10-CM

## 2018-12-06 LAB
ANION GAP SERPL CALC-SCNC: 16 MMOL/L — SIGNIFICANT CHANGE UP (ref 5–17)
BLD GP AB SCN SERPL QL: NEGATIVE — SIGNIFICANT CHANGE UP
BUN SERPL-MCNC: 68 MG/DL — HIGH (ref 7–23)
CALCIUM SERPL-MCNC: 9.2 MG/DL — SIGNIFICANT CHANGE UP (ref 8.4–10.5)
CHLORIDE SERPL-SCNC: 106 MMOL/L — SIGNIFICANT CHANGE UP (ref 96–108)
CO2 SERPL-SCNC: 24 MMOL/L — SIGNIFICANT CHANGE UP (ref 22–31)
CREAT SERPL-MCNC: 2.31 MG/DL — HIGH (ref 0.5–1.3)
GLUCOSE BLDC GLUCOMTR-MCNC: 153 MG/DL — HIGH (ref 70–99)
GLUCOSE BLDC GLUCOMTR-MCNC: 196 MG/DL — HIGH (ref 70–99)
GLUCOSE BLDC GLUCOMTR-MCNC: 213 MG/DL — HIGH (ref 70–99)
GLUCOSE BLDC GLUCOMTR-MCNC: 215 MG/DL — HIGH (ref 70–99)
GLUCOSE SERPL-MCNC: 194 MG/DL — HIGH (ref 70–99)
HCT VFR BLD CALC: 25.1 % — LOW (ref 39–50)
HGB BLD-MCNC: 7.8 G/DL — LOW (ref 13–17)
MAGNESIUM SERPL-MCNC: 2.4 MG/DL — SIGNIFICANT CHANGE UP (ref 1.6–2.6)
MCHC RBC-ENTMCNC: 28.5 PG — SIGNIFICANT CHANGE UP (ref 27–34)
MCHC RBC-ENTMCNC: 31.1 GM/DL — LOW (ref 32–36)
MCV RBC AUTO: 91.6 FL — SIGNIFICANT CHANGE UP (ref 80–100)
PLATELET # BLD AUTO: 120 K/UL — LOW (ref 150–400)
POTASSIUM SERPL-MCNC: 3.2 MMOL/L — LOW (ref 3.5–5.3)
POTASSIUM SERPL-SCNC: 3.2 MMOL/L — LOW (ref 3.5–5.3)
RBC # BLD: 2.74 M/UL — LOW (ref 4.2–5.8)
RBC # FLD: 15.6 % — HIGH (ref 10.3–14.5)
RH IG SCN BLD-IMP: POSITIVE — SIGNIFICANT CHANGE UP
SODIUM SERPL-SCNC: 146 MMOL/L — HIGH (ref 135–145)
WBC # BLD: 3.78 K/UL — LOW (ref 3.8–10.5)
WBC # FLD AUTO: 3.78 K/UL — LOW (ref 3.8–10.5)

## 2018-12-06 PROCEDURE — 99233 SBSQ HOSP IP/OBS HIGH 50: CPT | Mod: GC

## 2018-12-06 PROCEDURE — 99233 SBSQ HOSP IP/OBS HIGH 50: CPT

## 2018-12-06 RX ORDER — HYDRALAZINE HCL 50 MG
75 TABLET ORAL
Qty: 0 | Refills: 0 | Status: DISCONTINUED | OUTPATIENT
Start: 2018-12-06 | End: 2018-12-11

## 2018-12-06 RX ORDER — POTASSIUM CHLORIDE 20 MEQ
20 PACKET (EA) ORAL
Qty: 0 | Refills: 0 | Status: COMPLETED | OUTPATIENT
Start: 2018-12-06 | End: 2018-12-06

## 2018-12-06 RX ORDER — NIFEDIPINE 30 MG
90 TABLET, EXTENDED RELEASE 24 HR ORAL
Qty: 0 | Refills: 0 | Status: DISCONTINUED | OUTPATIENT
Start: 2018-12-06 | End: 2018-12-11

## 2018-12-06 RX ADMIN — Medication 650 MILLIGRAM(S): at 20:36

## 2018-12-06 RX ADMIN — Medication 1: at 13:10

## 2018-12-06 RX ADMIN — HEPARIN SODIUM 5000 UNIT(S): 5000 INJECTION INTRAVENOUS; SUBCUTANEOUS at 17:18

## 2018-12-06 RX ADMIN — Medication 50 MILLIGRAM(S): at 05:02

## 2018-12-06 RX ADMIN — SERTRALINE 100 MILLIGRAM(S): 25 TABLET, FILM COATED ORAL at 13:12

## 2018-12-06 RX ADMIN — HEPARIN SODIUM 5000 UNIT(S): 5000 INJECTION INTRAVENOUS; SUBCUTANEOUS at 05:02

## 2018-12-06 RX ADMIN — PANTOPRAZOLE SODIUM 40 MILLIGRAM(S): 20 TABLET, DELAYED RELEASE ORAL at 05:02

## 2018-12-06 RX ADMIN — Medication 20 MILLIEQUIVALENT(S): at 16:25

## 2018-12-06 RX ADMIN — Medication 75 MILLIGRAM(S): at 20:35

## 2018-12-06 RX ADMIN — Medication 1: at 17:20

## 2018-12-06 RX ADMIN — Medication 75 MILLIGRAM(S): at 13:12

## 2018-12-06 RX ADMIN — Medication 650 MILLIGRAM(S): at 03:25

## 2018-12-06 RX ADMIN — Medication 650 MILLIGRAM(S): at 21:36

## 2018-12-06 RX ADMIN — Medication 2: at 08:59

## 2018-12-06 RX ADMIN — Medication 40 MILLIGRAM(S): at 17:18

## 2018-12-06 RX ADMIN — Medication 20 MILLIEQUIVALENT(S): at 20:35

## 2018-12-06 RX ADMIN — Medication 60 MILLIGRAM(S): at 05:02

## 2018-12-06 RX ADMIN — Medication 50 MILLIGRAM(S): at 09:01

## 2018-12-06 RX ADMIN — Medication 81 MILLIGRAM(S): at 13:11

## 2018-12-06 RX ADMIN — SIMVASTATIN 20 MILLIGRAM(S): 20 TABLET, FILM COATED ORAL at 22:20

## 2018-12-06 RX ADMIN — BUDESONIDE AND FORMOTEROL FUMARATE DIHYDRATE 2 PUFF(S): 160; 4.5 AEROSOL RESPIRATORY (INHALATION) at 05:02

## 2018-12-06 RX ADMIN — Medication 2 MILLIGRAM(S): at 22:20

## 2018-12-06 RX ADMIN — Medication 650 MILLIGRAM(S): at 04:25

## 2018-12-06 RX ADMIN — Medication 90 MILLIGRAM(S): at 13:10

## 2018-12-06 RX ADMIN — Medication 50 MILLIGRAM(S): at 20:35

## 2018-12-06 RX ADMIN — INSULIN GLARGINE 5 UNIT(S): 100 INJECTION, SOLUTION SUBCUTANEOUS at 22:21

## 2018-12-06 RX ADMIN — BUDESONIDE AND FORMOTEROL FUMARATE DIHYDRATE 2 PUFF(S): 160; 4.5 AEROSOL RESPIRATORY (INHALATION) at 17:18

## 2018-12-06 RX ADMIN — CLOPIDOGREL BISULFATE 75 MILLIGRAM(S): 75 TABLET, FILM COATED ORAL at 13:10

## 2018-12-06 RX ADMIN — Medication 40 MILLIGRAM(S): at 03:48

## 2018-12-06 RX ADMIN — Medication 20 MILLIEQUIVALENT(S): at 17:21

## 2018-12-06 NOTE — PROGRESS NOTE ADULT - PROBLEM SELECTOR PLAN 2
Hemoglobin A1C 4.7 in November.  Appeared during prior admission to have steroid induced hyperglycemia.  When patient first presented in early November, was taking Lantus 8units qhs while not on steroids.   Continue Lantus 5 units qhs.  Continue low dose sliding scale coverage.  Completed Prednisone taper on 12/4.  Monitor blood sugars.

## 2018-12-06 NOTE — PROGRESS NOTE ADULT - ASSESSMENT
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  P/W worsening SOB.    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  P/W worsening SOB.      REC:  - clinically unstable with worsening SOB  - c/w IV Lasix to decrease preload  - increase nifedipine to 90 daily and hydralazine to 75 tid for additional afterload reduction  - palliative care consult with family meeting  - c/w medical management per primary  - will discuss with Dr. Estephania Brown, #[id number]  Cardiology Fellow    Brendan Bazzi M.D.  Cardiology Consult Service  [phone] or [phone]

## 2018-12-06 NOTE — PROGRESS NOTE ADULT - ASSESSMENT
The patient is an 83-year-old man with PMH HTN, HLD, Type 2 DM, CAD s/p 6 stents (last stent 6 years ago), HFpEF (EF 75%), and CKD stage 4, presented with shortness of breath for 4 days, found to have pulmonary edema concerning for decompensated heart failure in setting of mitral valve insufficiency.  Recently admitted, found to have severe MR with torn chordae.  Seen by structural heart team and CT surgery last admission, plan initially was for outpatient follow-up for Mitraclip.

## 2018-12-06 NOTE — PROGRESS NOTE ADULT - PROBLEM SELECTOR PLAN 5
Patient recommend for honey-thick liquids.  Per patient wishes, ordered for nectar-thick with thin liquids per patient request for comfort.  Patient and family explained and understand risks of aspiration.  Case d/w speech pathologist.

## 2018-12-06 NOTE — PROGRESS NOTE ADULT - SUBJECTIVE AND OBJECTIVE BOX
Cardiology Progress Note    Interval: Pt breathing heavily and unable to answer questions. Per nursing staff, pt was agitated and SOB overnight. No chest pain.    Tele:    HPI:  83M with PMH HTN, HLD, T2DM, CAD s/p 6 stents (last stent 6 years ago), HFpEF (EF 75%, and CKD stage 4, presents with shortness of breath for 4 days. He endorses dyspnea on exertion with minimal ambulation, orthopnea, and worsening lower extremity edema. He has chronic right sided chest pain which radiated to the arm. He denies fevers, chills, productive cough, diarrhea or dysuria. No sick contacts. Patient was recently discharged from [facility] for similar symptoms on 2018 during which inpatient workup of heart failure exacerbation revealed: cardiac catheterization without hemodynamically significant stenosis; MARBIN showed severe mitral regurg with torn chordae, moderate-severe aortic regurgitation and severe pulmonary hypertension. He was medically optimized and evaluated by CT surgery for valve repair which was deferred as patient clinically improved.    In ED, T 98.2, HR 82, BP: 129/52, and 91 % on RA. Labs notable for 9.4 (baseline 8-9s), Cr 2.16 (improved 2.32 on 2018), proBNP 3000, and lactate 1.3. Troponin 36. RVP neg. CXR with hilar congestion. He was given lasix 80 x 1 (02 Dec 2018 19:12)      Medications:  acetaminophen   Tablet .. 650 milliGRAM(s) Oral every 6 hours PRN  aspirin enteric coated 81 milliGRAM(s) Oral daily  buDESOnide 160 MICROgram(s)/formoterol 4.5 MICROgram(s) Inhaler 2 Puff(s) Inhalation two times a day  clopidogrel Tablet 75 milliGRAM(s) Oral daily  dextrose 40% Gel 15 Gram(s) Oral once PRN  dextrose 5%. 1000 milliLiter(s) IV Continuous <Continuous>  dextrose 50% Injectable 12.5 Gram(s) IV Push once  dextrose 50% Injectable 25 Gram(s) IV Push once  dextrose 50% Injectable 25 Gram(s) IV Push once  doxazosin 2 milliGRAM(s) Oral at bedtime  furosemide   Injectable 40 milliGRAM(s) IV Push every 12 hours  glucagon  Injectable 1 milliGRAM(s) IntraMuscular once PRN  heparin  Injectable 5000 Unit(s) SubCutaneous every 12 hours  hydrALAZINE 50 milliGRAM(s) Oral <User Schedule>  insulin glargine Injectable (LANTUS) 5 Unit(s) SubCutaneous at bedtime  insulin lispro (HumaLOG) corrective regimen sliding scale   SubCutaneous three times a day before meals  insulin lispro (HumaLOG) corrective regimen sliding scale   SubCutaneous at bedtime  metoprolol tartrate 50 milliGRAM(s) Oral <User Schedule>  NIFEdipine XL 60 milliGRAM(s) Oral <User Schedule>  pantoprazole    Tablet 40 milliGRAM(s) Oral before breakfast  senna 2 Tablet(s) Oral at bedtime  sertraline 100 milliGRAM(s) Oral daily  simvastatin 20 milliGRAM(s) Oral at bedtime      Review of Systems:  Constitutional: [ ] Fever [ ] Chills [ ] Fatigue [ ] Weight change   HEENT: [ ] Blurred vision [ ] Eye Pain [ ] Headache [ ] Runny nose [ ] Sore Throat   Respiratory: [ ] Cough [ ] Wheezing [ ] Shortness of breath  Cardiovascular: [ ] Chest Pain [ ] Palpitations [ ] FLOREZ [ ] PND [ ] Orthopnea  Gastrointestinal: [ ] Abdominal Pain [ ] Diarrhea [ ] Constipation [ ] Hemorrhoids [ ] Nausea [ ] Vomiting  Genitourinary: [ ] Nocturia [ ] Dysuria [ ] Incontinence  Extremities: [ ] Swelling [ ] Joint Pain  Neurologic: [ ] Focal deficit [ ] Paresthesias [ ] Syncope  Lymphatic: [ ] Swelling [ ] Lymphadenopathy   Skin: [ ] Rash [ ] Ecchymoses [ ] Wounds [ ] Lesions  Psychiatry: [ ] Depression [ ] Suicidal/Homicidal Ideation [ ] Anxiety [ ] Sleep Disturbances  [ ] 10 point review of systems is otherwise negative except as mentioned above            [x]Unable to obtain    Vitals:  T(C): 36.5 (18 @ 04:59), Max: 37.1 (18 @ 17:11)  HR: 80 (18 @ 04:59) (70 - 82)  BP: 146/60 (18 @ 04:59) (146/60 - 156/58)  BP(mean): --  RR: 20 (18 @ 04:59) (18 - 20)  SpO2: 94% (18 @ 04:59) (93% - 99%)  Wt(kg): --  Daily     Daily Weight in k.7 (06 Dec 2018 04:59)  I&O's Summary    05 Dec 2018 07:01  -  06 Dec 2018 07:00  --------------------------------------------------------  IN: 730 mL / OUT: 750 mL / NET: -20 mL        Physical Exam:  General: acute distress with dyspnea  Eye: PERRL, EOMI  HENT: Normal oral mucosa NC/AT  CV: Normal S1/S2, RRR, 3/6 systolic murmur, 1+ edema, no elevation in JVP  Resp: Increased work of breathing, coarse breath sounds, no wheezing, no crackles  Abd: Soft, Non-tender, Non-distended, BS+  Ext: No clubbing, No joint deformity   Neuro: Non-focal, motor and sensory intact  Lymph: No lymphadenopathy  Psych: AAOx3, Mood & affect NOT appropriate  Skin: No rashes, No ecchymoses, No cyanosis    Labs:                        7.7    5.97  )-----------( 125      ( 05 Dec 2018 08:15 )             24.9     12-05    145  |  106  |  61<H>  ----------------------------<  134<H>  3.7   |  24  |  2.39<H>    Ca    9.1      05 Dec 2018 07:08  Mg     2.4     12-05            Serum Pro-Brain Natriuretic Peptide: 3765 pg/mL ( @ 16:42)          New results/imaging: Cardiology Progress Note    Interval: Pt breathing heavily and unable to answer questions. Per nursing staff, pt was agitated and SOB overnight. No chest pain.            Medications:  acetaminophen   Tablet .. 650 milliGRAM(s) Oral every 6 hours PRN  aspirin enteric coated 81 milliGRAM(s) Oral daily  buDESOnide 160 MICROgram(s)/formoterol 4.5 MICROgram(s) Inhaler 2 Puff(s) Inhalation two times a day  clopidogrel Tablet 75 milliGRAM(s) Oral daily  dextrose 40% Gel 15 Gram(s) Oral once PRN  dextrose 5%. 1000 milliLiter(s) IV Continuous <Continuous>  dextrose 50% Injectable 12.5 Gram(s) IV Push once  dextrose 50% Injectable 25 Gram(s) IV Push once  dextrose 50% Injectable 25 Gram(s) IV Push once  doxazosin 2 milliGRAM(s) Oral at bedtime  furosemide   Injectable 40 milliGRAM(s) IV Push every 12 hours  glucagon  Injectable 1 milliGRAM(s) IntraMuscular once PRN  heparin  Injectable 5000 Unit(s) SubCutaneous every 12 hours  hydrALAZINE 50 milliGRAM(s) Oral <User Schedule>  insulin glargine Injectable (LANTUS) 5 Unit(s) SubCutaneous at bedtime  insulin lispro (HumaLOG) corrective regimen sliding scale   SubCutaneous three times a day before meals  insulin lispro (HumaLOG) corrective regimen sliding scale   SubCutaneous at bedtime  metoprolol tartrate 50 milliGRAM(s) Oral <User Schedule>  NIFEdipine XL 60 milliGRAM(s) Oral <User Schedule>  pantoprazole    Tablet 40 milliGRAM(s) Oral before breakfast  senna 2 Tablet(s) Oral at bedtime  sertraline 100 milliGRAM(s) Oral daily  simvastatin 20 milliGRAM(s) Oral at bedtime      Review of Systems:  Constitutional: [ ] Fever [ ] Chills [ ] Fatigue [ ] Weight change   HEENT: [ ] Blurred vision [ ] Eye Pain [ ] Headache [ ] Runny nose [ ] Sore Throat   Respiratory: [ ] Cough [ ] Wheezing [ ] Shortness of breath  Cardiovascular: [ ] Chest Pain [ ] Palpitations [ ] FLOREZ [ ] PND [ ] Orthopnea  Gastrointestinal: [ ] Abdominal Pain [ ] Diarrhea [ ] Constipation [ ] Hemorrhoids [ ] Nausea [ ] Vomiting  Genitourinary: [ ] Nocturia [ ] Dysuria [ ] Incontinence  Extremities: [ ] Swelling [ ] Joint Pain  Neurologic: [ ] Focal deficit [ ] Paresthesias [ ] Syncope  Lymphatic: [ ] Swelling [ ] Lymphadenopathy   Skin: [ ] Rash [ ] Ecchymoses [ ] Wounds [ ] Lesions  Psychiatry: [ ] Depression [ ] Suicidal/Homicidal Ideation [ ] Anxiety [ ] Sleep Disturbances  [x ] 10 point review of systems is otherwise negative except as mentioned above            [x]Unable to obtain    Vitals:  T(C): 36.5 (18 @ 04:59), Max: 37.1 (18 @ 17:11)  HR: 80 (18 @ 04:59) (70 - 82)  BP: 146/60 (18 @ 04:59) (146/60 - 156/58)  RR: 20 (18 @ 04:59) (18 - 20)  SpO2: 94% (18 @ 04:59) (93% - 99%)  Daily Weight in k.7 (06 Dec 2018 04:59)      Physical Exam:  General: acute distress with dyspnea  Eye: PERRL, EOMI  HENT: Normal oral mucosa NC/AT  CV: Normal S1/S2, RRR, 3/6 systolic murmur, 1+ edema, no elevation in JVP  Resp: Increased work of breathing, coarse breath sounds, no wheezing, no crackles  Abd: Soft, Non-tender, Non-distended, BS+  Ext: No clubbing, No joint deformity   Neuro: Non-focal, motor and sensory intact  Lymph: No lymphadenopathy  Psych: AAOx3, Mood & affect NOT appropriate  Skin: No rashes, No ecchymoses, No cyanosis      I&O's Summary    05 Dec 2018 07:01  -  06 Dec 2018 07:00  --------------------------------------------------------  IN: 730 mL / OUT: 750 mL / NET: -20 mL      Labs:                      7.7    5.97  )-----------( 125      ( 05 Dec 2018 08:15 )             24.9     12-  145  |  106  |  61<H>  ----------------------------<  134<H>  3.7   |  24  |  2.39<H>    Ca    9.1      05 Dec 2018 07:08  Mg     2.4     12-05    Serum Pro-Brain Natriuretic Peptide: 3765 pg/mL ( @ 16:42)

## 2018-12-06 NOTE — PROGRESS NOTE ADULT - PROBLEM SELECTOR PLAN 1
Patient presenting with worsening dyspnea with pulmonary edema secondary to acute on chronic HFpEF in the setting of known severe MR as well as moderate to severe aortic regurgitation with severe pulm HTN.  Leg edema improved with IV Lasix and now without crackles on exam, but still reports orthopnea.  Per Dr. Bazzi who spoke with CT surgery, patient is not a candidate for an intervention on the mitral valve.  Continue supplemental oxygen, IV Lasix BID.  Palliative care consult requested after discussion with Dr. Bazzi, patient, daughter, and son-in-law at bedside yesterday.  DNR order placed and form signed per patient's wishes.  Case d/ Dr. Bazzi

## 2018-12-06 NOTE — CHART NOTE - NSCHARTNOTEFT_GEN_A_CORE
Called by RN to evaluate pt's shortness of breath. Pt examined at the bedside. Alert & oriented x3, sitting at the edge of the bed. Requesting water with ice not "the thick staff", angry. Denies CP, palpitations, diaphoresis, nausea, vomiting, headache, back or abdominal pain.    Vital Signs Last 24 Hrs    T(F): 97.3 (06 Dec 2018 00:12),   HR: 80 (06 Dec 2018 03:45)   BP: 156/58 (06 Dec 2018 03:45)   RR: 20 (06 Dec 2018 03:45)   SpO2: 98%     doxazosin 2 milliGRAM(s) Oral at bedtime  furosemide   Injectable 40 milliGRAM(s) IV Push every 12 hours  hydrALAZINE 50 milliGRAM(s) Oral <User Schedule>  metoprolol tartrate 50 milliGRAM(s) Oral <User Schedule>  NIFEdipine XL 60 milliGRAM(s) Oral <User Schedule>      Labs                          7.7    5.97  )-----------( 125      ( 05 Dec 2018 08:15 )             24.9       12-05    145  |  106  |  61<H>  ----------------------------<  134<H>  3.7   |  24  |  2.39<H>    Ca    9.1      05 Dec 2018 07:08  Mg     2.4     12-05        PHYSICAL EXAM:  GENERAL: NAD, well-developed  HEAD:  Atraumatic, Normocephalic  EYES: EOMI, PERRLA, conjunctiva and sclera clear  NECK: Supple, No JVD  CHEST/LUNG: Clear to auscultation bilaterally; No wheeze  HEART: Regular rate and rhythm; No murmurs, rubs, or gallops  ABDOMEN: Soft, Nontender, Nondistended; Bowel sounds present  EXTREMITIES:  2+ Peripheral Pulses, No clubbing, cyanosis, or edema  PSYCH: AAOx3  NEUROLOGY: non-focal  SKIN: No rashes or lesions    HPI:  83M with PMH HTN, HLD, T2DM, CAD s/p 6 stents (last stent 6 years ago), HFpEF (EF 75%, and CKD stage 4, presents with shortness of breath for 4 days. He endorses dyspnea on exertion with minimal ambulation, orthopnea, and worsening lower extremity edema. He has chronic right sided chest pain which radiated to the arm. He denies fevers, chills, productive cough, diarrhea or dysuria. No sick contacts. Patient was recently discharged from [facility] for similar symptoms on 11/20/2018 during which inpatient workup of heart failure exacerbation revealed: cardiac catheterization without hemodynamically significant stenosis; MARBIN showed severe mitral regurg with torn chordae, moderate-severe aortic regurgitation and severe pulmonary hypertension. He was medically optimized and evaluated by CT surgery for valve repair which was deferred as patient clinically improved.    In ED, T 98.2, HR 82, BP: 129/52, and 91 % on RA. Labs notable for 9.4 (baseline 8-9s), Cr 2.16 (improved 2.32 on 11/2018), proBNP 3000, and lactate 1.3. Troponin 36. RVP neg. CXR with hilar congestion. He was given lasix 80 x 1 (02 Dec 2018 19:12) Called by RN to evaluate pt's shortness of breath. Pt examined at the bedside. Alert & oriented x3, sitting at the edge of the bed with dyspnea. Complaints of 6/10 back pain. Requesting water with ice not "the thick staff", inpatient, angry. Denies CP, palpitations, diaphoresis, nausea, vomiting, headache, back or abdominal pain.    Vital Signs Last 24 Hrs    T(F): 97.3 (06 Dec 2018 00:12),   HR: 80 (06 Dec 2018 03:45)   BP: 156/58 (06 Dec 2018 03:45)   RR: 20 (06 Dec 2018 03:45)   SpO2: 98%     doxazosin 2 milliGRAM(s) Oral at bedtime  furosemide   Injectable 40 milliGRAM(s) IV Push every 12 hours  hydrALAZINE 50 milliGRAM(s) Oral <User Schedule>  metoprolol tartrate 50 milliGRAM(s) Oral <User Schedule>  NIFEdipine XL 60 milliGRAM(s) Oral <User Schedule>      Labs                          7.7    5.97  )-----------( 125      ( 05 Dec 2018 08:15 )             24.9       12-05    145  |  106  |  61<H>  ----------------------------<  134<H>  3.7   |  24  |  2.39<H>    Ca    9.1      05 Dec 2018 07:08  Mg     2.4     12-05        PHYSICAL EXAM:  GENERAL: NAD, well-developed  HEAD:  Atraumatic, Normocephalic  EYES: EOMI, PERRLA, conjunctiva and sclera clear  NECK: Supple, No JVD  CHEST/LUNG: Clear to auscultation bilaterally; No wheeze  HEART: Regular rate and rhythm; No murmurs, rubs, or gallops  ABDOMEN: Soft, Nontender, Nondistended; Bowel sounds present  EXTREMITIES:  2+ Peripheral Pulses, No clubbing, cyanosis, or edema  PSYCH: AAOx3  NEUROLOGY: non-focal  SKIN: No rashes or lesions    A/P  82 y/o M with PMH HTN, HLD, T2DM, CAD s/p 6 stents, HFpEF , CKD stage 4, presents with dyspnea for 4 days, found with pulmonary edema, decompensated HF and mitral valve insufficiency, now with  dyspnea, orthopnea     1) Dyspnea, pulmonary edema 2/2 acute on chronic HFpEF in setting of severe MR    Lasix 40mg IVP now     c/w oxygen via NC    monitor I& O's    limit Called by RN to evaluate pt's shortness of breath. Pt examined at the bedside. Alert & oriented x3, sitting at the edge of the bed with dyspnea. Complaints of 6/10 back pain. Requesting water with ice not "the thick staff", inpatient, angry. Denies CP, palpitations, diaphoresis, nausea, vomiting, headache, back or abdominal pain.    Vital Signs Last 24 Hrs    T(F): 97.3 (06 Dec 2018 00:12),   HR: 80 (06 Dec 2018 03:45)   BP: 156/58 (06 Dec 2018 03:45)   RR: 20 (06 Dec 2018 03:45)   SpO2: 98%     doxazosin 2 milliGRAM(s) Oral at bedtime  furosemide   Injectable 40 milliGRAM(s) IV Push every 12 hours  hydrALAZINE 50 milliGRAM(s) Oral <User Schedule>  metoprolol tartrate 50 milliGRAM(s) Oral <User Schedule>  NIFEdipine XL 60 milliGRAM(s) Oral <User Schedule>      Labs                          7.7    5.97  )-----------( 125      ( 05 Dec 2018 08:15 )             24.9       12-05    145  |  106  |  61<H>  ----------------------------<  134<H>  3.7   |  24  |  2.39<H>    Ca    9.1      05 Dec 2018 07:08  Mg     2.4     12-05        PHYSICAL EXAM:  GENERAL: NAD, well-developed  HEAD:  Atraumatic, Normocephalic  EYES: EOMI, PERRLA, conjunctiva and sclera clear  NECK: Supple, No JVD  CHEST/LUNG: Clear to auscultation bilaterally; No wheeze  HEART: Regular rate and rhythm; No murmurs, rubs, or gallops  ABDOMEN: Soft, Nontender, Nondistended; Bowel sounds present  EXTREMITIES:  2+ Peripheral Pulses, No clubbing, cyanosis, or edema  PSYCH: AAOx3  NEUROLOGY: non-focal  SKIN: No rashes or lesions    A/P  82 y/o M with PMH HTN, HLD, T2DM, CAD s/p 6 stents, HFpEF , CKD stage 4, presents with dyspnea for 4 days, found with pulmonary edema, decompensated HF and mitral valve insufficiency, now with  dyspnea, orthopnea     1)  Acute on chronic HFpEF in setting of severe MR, dyspnea, CAD     AM dose of Lasix 40mg IVP now     c/w Symbicort , hydralazine, simvastatin, ASA    c/w oxygen via NC    monitor I& O's    limit fluids (thickened)  taken by mouth   daily weight    monitor vital signs  Will f/u with day team

## 2018-12-07 LAB
ANION GAP SERPL CALC-SCNC: 16 MMOL/L — SIGNIFICANT CHANGE UP (ref 5–17)
BUN SERPL-MCNC: 69 MG/DL — HIGH (ref 7–23)
CALCIUM SERPL-MCNC: 9.2 MG/DL — SIGNIFICANT CHANGE UP (ref 8.4–10.5)
CHLORIDE SERPL-SCNC: 104 MMOL/L — SIGNIFICANT CHANGE UP (ref 96–108)
CO2 SERPL-SCNC: 24 MMOL/L — SIGNIFICANT CHANGE UP (ref 22–31)
CREAT SERPL-MCNC: 2.35 MG/DL — HIGH (ref 0.5–1.3)
CULTURE RESULTS: SIGNIFICANT CHANGE UP
CULTURE RESULTS: SIGNIFICANT CHANGE UP
GLUCOSE BLDC GLUCOMTR-MCNC: 153 MG/DL — HIGH (ref 70–99)
GLUCOSE BLDC GLUCOMTR-MCNC: 158 MG/DL — HIGH (ref 70–99)
GLUCOSE BLDC GLUCOMTR-MCNC: 159 MG/DL — HIGH (ref 70–99)
GLUCOSE BLDC GLUCOMTR-MCNC: 180 MG/DL — HIGH (ref 70–99)
GLUCOSE SERPL-MCNC: 150 MG/DL — HIGH (ref 70–99)
HCT VFR BLD CALC: 25.3 % — LOW (ref 39–50)
HGB BLD-MCNC: 7.8 G/DL — LOW (ref 13–17)
MAGNESIUM SERPL-MCNC: 2.3 MG/DL — SIGNIFICANT CHANGE UP (ref 1.6–2.6)
MCHC RBC-ENTMCNC: 27.5 PG — SIGNIFICANT CHANGE UP (ref 27–34)
MCHC RBC-ENTMCNC: 30.8 GM/DL — LOW (ref 32–36)
MCV RBC AUTO: 89.1 FL — SIGNIFICANT CHANGE UP (ref 80–100)
PLATELET # BLD AUTO: 124 K/UL — LOW (ref 150–400)
POTASSIUM SERPL-MCNC: 3.4 MMOL/L — LOW (ref 3.5–5.3)
POTASSIUM SERPL-SCNC: 3.4 MMOL/L — LOW (ref 3.5–5.3)
RBC # BLD: 2.84 M/UL — LOW (ref 4.2–5.8)
RBC # FLD: 15.7 % — HIGH (ref 10.3–14.5)
SODIUM SERPL-SCNC: 144 MMOL/L — SIGNIFICANT CHANGE UP (ref 135–145)
SPECIMEN SOURCE: SIGNIFICANT CHANGE UP
SPECIMEN SOURCE: SIGNIFICANT CHANGE UP
WBC # BLD: 3.57 K/UL — LOW (ref 3.8–10.5)
WBC # FLD AUTO: 3.57 K/UL — LOW (ref 3.8–10.5)

## 2018-12-07 PROCEDURE — 99497 ADVNCD CARE PLAN 30 MIN: CPT | Mod: 25

## 2018-12-07 PROCEDURE — 99232 SBSQ HOSP IP/OBS MODERATE 35: CPT | Mod: GC

## 2018-12-07 PROCEDURE — 99232 SBSQ HOSP IP/OBS MODERATE 35: CPT

## 2018-12-07 PROCEDURE — 99223 1ST HOSP IP/OBS HIGH 75: CPT

## 2018-12-07 RX ORDER — POTASSIUM CHLORIDE 20 MEQ
20 PACKET (EA) ORAL
Qty: 0 | Refills: 0 | Status: COMPLETED | OUTPATIENT
Start: 2018-12-07 | End: 2018-12-07

## 2018-12-07 RX ORDER — OXYCODONE HYDROCHLORIDE 5 MG/1
5 TABLET ORAL
Qty: 0 | Refills: 0 | Status: DISCONTINUED | OUTPATIENT
Start: 2018-12-07 | End: 2018-12-11

## 2018-12-07 RX ADMIN — Medication 50 MILLIGRAM(S): at 09:26

## 2018-12-07 RX ADMIN — Medication 1: at 18:18

## 2018-12-07 RX ADMIN — SIMVASTATIN 20 MILLIGRAM(S): 20 TABLET, FILM COATED ORAL at 21:14

## 2018-12-07 RX ADMIN — Medication 20 MILLIEQUIVALENT(S): at 14:35

## 2018-12-07 RX ADMIN — HEPARIN SODIUM 5000 UNIT(S): 5000 INJECTION INTRAVENOUS; SUBCUTANEOUS at 05:19

## 2018-12-07 RX ADMIN — Medication 40 MILLIGRAM(S): at 05:18

## 2018-12-07 RX ADMIN — SERTRALINE 100 MILLIGRAM(S): 25 TABLET, FILM COATED ORAL at 13:01

## 2018-12-07 RX ADMIN — Medication 90 MILLIGRAM(S): at 05:19

## 2018-12-07 RX ADMIN — Medication 75 MILLIGRAM(S): at 20:09

## 2018-12-07 RX ADMIN — Medication 75 MILLIGRAM(S): at 13:11

## 2018-12-07 RX ADMIN — PANTOPRAZOLE SODIUM 40 MILLIGRAM(S): 20 TABLET, DELAYED RELEASE ORAL at 05:18

## 2018-12-07 RX ADMIN — Medication 81 MILLIGRAM(S): at 13:01

## 2018-12-07 RX ADMIN — SENNA PLUS 2 TABLET(S): 8.6 TABLET ORAL at 21:14

## 2018-12-07 RX ADMIN — INSULIN GLARGINE 5 UNIT(S): 100 INJECTION, SOLUTION SUBCUTANEOUS at 22:24

## 2018-12-07 RX ADMIN — Medication 20 MILLIEQUIVALENT(S): at 17:13

## 2018-12-07 RX ADMIN — Medication 50 MILLIGRAM(S): at 20:09

## 2018-12-07 RX ADMIN — BUDESONIDE AND FORMOTEROL FUMARATE DIHYDRATE 2 PUFF(S): 160; 4.5 AEROSOL RESPIRATORY (INHALATION) at 17:14

## 2018-12-07 RX ADMIN — Medication 650 MILLIGRAM(S): at 17:50

## 2018-12-07 RX ADMIN — Medication 20 MILLIEQUIVALENT(S): at 13:00

## 2018-12-07 RX ADMIN — Medication 1: at 13:00

## 2018-12-07 RX ADMIN — Medication 1: at 09:20

## 2018-12-07 RX ADMIN — HEPARIN SODIUM 5000 UNIT(S): 5000 INJECTION INTRAVENOUS; SUBCUTANEOUS at 17:14

## 2018-12-07 RX ADMIN — Medication 75 MILLIGRAM(S): at 05:19

## 2018-12-07 RX ADMIN — CLOPIDOGREL BISULFATE 75 MILLIGRAM(S): 75 TABLET, FILM COATED ORAL at 13:01

## 2018-12-07 RX ADMIN — Medication 2 MILLIGRAM(S): at 21:14

## 2018-12-07 RX ADMIN — Medication 40 MILLIGRAM(S): at 17:14

## 2018-12-07 RX ADMIN — BUDESONIDE AND FORMOTEROL FUMARATE DIHYDRATE 2 PUFF(S): 160; 4.5 AEROSOL RESPIRATORY (INHALATION) at 05:18

## 2018-12-07 RX ADMIN — Medication 650 MILLIGRAM(S): at 17:16

## 2018-12-07 NOTE — PROGRESS NOTE ADULT - PROBLEM SELECTOR PLAN 2
Hemoglobin A1C 4.7 in November.  Appeared during prior admission to have steroid induced hyperglycemia while being treat for gout flare, Prednisone taper completed on 12/4..  When patient first presented in early November, was taking Lantus 8units qhs while not on steroids.  Continue Lantus 5 units qhs.  AM  this morning.  Continue low dose sliding scale coverage.  Continue to monitor blood sugars.

## 2018-12-07 NOTE — CONSULT NOTE ADULT - ATTENDING COMMENTS
Pt seen and examined  Anecdotal reports of dyspnea, none at this time  Hx of orthopnea with upright sleep for > 20 years  He believes that his time living is truncated and in light of that information a choice to make comfort be paramount is prevailing philosophy of care.  His desire is to return home to his daughter's house, where he receives care from his son in law Primo 24/7.  Caregiver burden is tremendous.  He is willing to undertake pleasure feeds and  strategies to mitigate risk.  DNR/DNI    Time spent 16 min on ACP

## 2018-12-07 NOTE — PROGRESS NOTE ADULT - SUBJECTIVE AND OBJECTIVE BOX
SUBJECTIVE:  OOB in chair.  Very lethargic, but neighbor in next bed volunteered that the patient had been up most of the night, screaming at times.  Currently lethargic, but calm when aroused.  Denies any SOB.  Pulse ox 97% on NC.  NAD.    MEDICATIONS  (STANDING):  aspirin enteric coated 81 milliGRAM(s) Oral daily  buDESOnide 160 MICROgram(s)/formoterol 4.5 MICROgram(s) Inhaler 2 Puff(s) Inhalation two times a day  clopidogrel Tablet 75 milliGRAM(s) Oral daily  dextrose 5%. 1000 milliLiter(s) (50 mL/Hr) IV Continuous <Continuous>  dextrose 50% Injectable 12.5 Gram(s) IV Push once  dextrose 50% Injectable 25 Gram(s) IV Push once  dextrose 50% Injectable 25 Gram(s) IV Push once  doxazosin 2 milliGRAM(s) Oral at bedtime  furosemide   Injectable 40 milliGRAM(s) IV Push every 12 hours  heparin  Injectable 5000 Unit(s) SubCutaneous every 12 hours  hydrALAZINE 75 milliGRAM(s) Oral <User Schedule>  insulin glargine Injectable (LANTUS) 5 Unit(s) SubCutaneous at bedtime  insulin lispro (HumaLOG) corrective regimen sliding scale   SubCutaneous three times a day before meals  insulin lispro (HumaLOG) corrective regimen sliding scale   SubCutaneous at bedtime  metoprolol tartrate 50 milliGRAM(s) Oral <User Schedule>  NIFEdipine XL 90 milliGRAM(s) Oral <User Schedule>  pantoprazole    Tablet 40 milliGRAM(s) Oral before breakfast  senna 2 Tablet(s) Oral at bedtime  sertraline 100 milliGRAM(s) Oral daily  simvastatin 20 milliGRAM(s) Oral at bedtime    MEDICATIONS  (PRN):  acetaminophen   Tablet .. 650 milliGRAM(s) Oral every 6 hours PRN Moderate Pain (4 - 6)  dextrose 40% Gel 15 Gram(s) Oral once PRN Blood Glucose LESS THAN 70 milliGRAM(s)/deciliter  glucagon  Injectable 1 milliGRAM(s) IntraMuscular once PRN Glucose LESS THAN 70 milligrams/deciliter      Vital Signs Last 24 Hrs  T(C): 36.5 (07 Dec 2018 09:17), Max: 36.8 (06 Dec 2018 12:28)  T(F): 97.7 (07 Dec 2018 09:17), Max: 98.3 (06 Dec 2018 12:28)  HR: 70 (07 Dec 2018 09:17) (70 - 85)  BP: 139/65 (07 Dec 2018 09:17) (122/60 - 152/61)  BP(mean): --  RR: 18 (07 Dec 2018 09:17) (18 - 20)  SpO2: 97% (07 Dec 2018 09:17) (95% - 97%)    CAPILLARY BLOOD GLUCOSE      POCT Blood Glucose.: 153 mg/dL (07 Dec 2018 09:14)  POCT Blood Glucose.: 213 mg/dL (06 Dec 2018 22:05)  POCT Blood Glucose.: 196 mg/dL (06 Dec 2018 17:11)  POCT Blood Glucose.: 153 mg/dL (06 Dec 2018 12:56)    I&O's Summary    06 Dec 2018 07:01  -  07 Dec 2018 07:00  --------------------------------------------------------  IN: 420 mL / OUT: 700 mL / NET: -280 mL        PHYSICAL EXAM:  GENERAL: Looks stated age, NAD.  CARDIOVASCULAR: JELLY best heard at cardiac apex.  PULMONARY: Lungs clear to auscultation bilaterally.  No wheezes/rales/rhonchi  GI: Abdomen soft, Nontender.  Bowel sounds present  MSK/Ext:  Mild 1+ distal leg edema.  No calf tenderness bilaterally  PSYCH: Lethargic but arouseable.    LABS:                        7.8    3.57  )-----------( 124      ( 07 Dec 2018 09:20 )             25.3     12-07    144  |  104  |  69<H>  ----------------------------<  150<H>  3.4<L>   |  24  |  2.35<H>    Ca    9.2      07 Dec 2018 07:26  Mg     2.3     12-07                  RADIOLOGY & ADDITIONAL TESTS:

## 2018-12-07 NOTE — PROGRESS NOTE ADULT - SUBJECTIVE AND OBJECTIVE BOX
Interval History: c/o dyspnea overnight. could no lay flat    Review Of Systems:  Constitutional: [ ] Fever [ ] Chills [ ] Fatigue [ ] Weight change   HEENT: [ ] Blurred vision [ ] Eye Pain [ ] Headache [ ] Runny nose [ ] Sore Throat   Respiratory: [ ] Cough [ ] Wheezing [ ] Shortness of breath  Cardiovascular: [ ] Chest Pain [ ] Palpitations [ ] FLOREZ [ ] PND [ ] Orthopnea  Gastrointestinal: [ ] Abdominal Pain [ ] Diarrhea [ ] Constipation [ ] Hemorrhoids [ ] Nausea [ ] Vomiting  Genitourinary: [ ] Nocturia [ ] Dysuria [ ] Incontinence  Extremities: [ ] Swelling [ ] Joint Pain  Neurologic: [ ] Focal deficit [ ] Paresthesias [ ] Syncope  Lymphatic: [ ] Swelling [ ] Lymphadenopathy   Skin: [ ] Rash [ ] Ecchymoses [ ] Wounds [ ] Lesions  Psychiatry: [ ] Depression [ ] Suicidal/Homicidal Ideation [ ] Anxiety [ ] Sleep Disturbances  [x ] 10 point review of systems is otherwise negative except as mentioned above            [ ]Unable to obtain    Medications:  acetaminophen   Tablet .. 650 milliGRAM(s) Oral every 6 hours PRN  aspirin enteric coated 81 milliGRAM(s) Oral daily  buDESOnide 160 MICROgram(s)/formoterol 4.5 MICROgram(s) Inhaler 2 Puff(s) Inhalation two times a day  clopidogrel Tablet 75 milliGRAM(s) Oral daily  dextrose 40% Gel 15 Gram(s) Oral once PRN  dextrose 5%. 1000 milliLiter(s) IV Continuous <Continuous>  dextrose 50% Injectable 12.5 Gram(s) IV Push once  dextrose 50% Injectable 25 Gram(s) IV Push once  dextrose 50% Injectable 25 Gram(s) IV Push once  doxazosin 2 milliGRAM(s) Oral at bedtime  furosemide   Injectable 40 milliGRAM(s) IV Push every 12 hours  glucagon  Injectable 1 milliGRAM(s) IntraMuscular once PRN  heparin  Injectable 5000 Unit(s) SubCutaneous every 12 hours  hydrALAZINE 75 milliGRAM(s) Oral <User Schedule>  insulin glargine Injectable (LANTUS) 5 Unit(s) SubCutaneous at bedtime  insulin lispro (HumaLOG) corrective regimen sliding scale   SubCutaneous three times a day before meals  insulin lispro (HumaLOG) corrective regimen sliding scale   SubCutaneous at bedtime  metoprolol tartrate 50 milliGRAM(s) Oral <User Schedule>  NIFEdipine XL 90 milliGRAM(s) Oral <User Schedule>  pantoprazole    Tablet 40 milliGRAM(s) Oral before breakfast  senna 2 Tablet(s) Oral at bedtime  sertraline 100 milliGRAM(s) Oral daily  simvastatin 20 milliGRAM(s) Oral at bedtime      Vitals:  ICU Vital Signs Last 24 Hrs  T(C): 36.6 (07 Dec 2018 05:13), Max: 36.8 (06 Dec 2018 12:28)  T(F): 97.9 (07 Dec 2018 05:13), Max: 98.3 (06 Dec 2018 12:28)  HR: 75 (07 Dec 2018 05:13) (70 - 85)  BP: 152/61 (07 Dec 2018 05:13) (122/60 - 152/61)  BP(mean): --  ABP: --  ABP(mean): --  RR: 20 (07 Dec 2018 05:13) (18 - 20)  SpO2: 97% (07 Dec 2018 05:13) (95% - 97%)    Daily     Daily   I&O's Summary    05 Dec 2018 07:01  -  06 Dec 2018 07:00  --------------------------------------------------------  IN: 730 mL / OUT: 750 mL / NET: -20 mL    06 Dec 2018 07:01  -  07 Dec 2018 06:17  --------------------------------------------------------  IN: 420 mL / OUT: 700 mL / NET: -280 mL        Physical Exam:  General: mild dyspnea  Eye: PERRL, EOMI  HENT: Normal oral mucosa NC/AT  CV: Normal S1/S2, RRR, 3/6 systolic murmur, no edema, no elevation in JVP  Resp: Increased work of breathing, coarse breath sounds, no wheezing, no crackles  Abd: Soft, Non-tender, Non-distended, BS+  Ext: No clubbing, No joint deformity   Neuro: Non-focal, motor and sensory intact  Lymph: No lymphadenopathy  Psych: AAOx3, Mood & affect NOT appropriate  Skin: No rashes, No ecchymoses, No cyanosis    Labs:                        7.8    3.78  )-----------( 120      ( 06 Dec 2018 09:09 )             25.1     12-06    146<H>  |  106  |  68<H>  ----------------------------<  194<H>  3.2<L>   |  24  |  2.31<H>    Ca    9.2      06 Dec 2018 07:14  Mg     2.4     12-06      Serum Pro-Brain Natriuretic Peptide: 3765 pg/mL (12-02 @ 16:42)      Culture - Blood (collected 12-02-18 @ 20:39)  Source: .Blood Blood-Venous  Preliminary Report (12-03-18 @ 21:01):    No growth to date.    Culture - Blood (collected 12-02-18 @ 20:38)  Source: .Blood Blood-Peripheral  Preliminary Report (12-03-18 @ 21:01):    No growth to date. Interval History: c/o dyspnea overnight. could no lay flat      Review Of Systems:  Constitutional: [ ] Fever [ ] Chills [ ] Fatigue [ ] Weight change   HEENT: [ ] Blurred vision [ ] Eye Pain [ ] Headache [ ] Runny nose [ ] Sore Throat   Respiratory: [ ] Cough [ ] Wheezing [ ] Shortness of breath  Cardiovascular: [ ] Chest Pain [ ] Palpitations [ ] FLOREZ [ ] PND [ ] Orthopnea  Gastrointestinal: [ ] Abdominal Pain [ ] Diarrhea [ ] Constipation [ ] Hemorrhoids [ ] Nausea [ ] Vomiting  Genitourinary: [ ] Nocturia [ ] Dysuria [ ] Incontinence  Extremities: [ ] Swelling [ ] Joint Pain  Neurologic: [ ] Focal deficit [ ] Paresthesias [ ] Syncope  Lymphatic: [ ] Swelling [ ] Lymphadenopathy   Skin: [ ] Rash [ ] Ecchymoses [ ] Wounds [ ] Lesions  Psychiatry: [ ] Depression [ ] Suicidal/Homicidal Ideation [ ] Anxiety [ ] Sleep Disturbances  [x ] 10 point review of systems is otherwise negative except as mentioned above            [ ]Unable to obtain      Medications:  acetaminophen   Tablet .. 650 milliGRAM(s) Oral every 6 hours PRN  aspirin enteric coated 81 milliGRAM(s) Oral daily  buDESOnide 160 MICROgram(s)/formoterol 4.5 MICROgram(s) Inhaler 2 Puff(s) Inhalation two times a day  clopidogrel Tablet 75 milliGRAM(s) Oral daily  dextrose 40% Gel 15 Gram(s) Oral once PRN  dextrose 5%. 1000 milliLiter(s) IV Continuous <Continuous>  dextrose 50% Injectable 12.5 Gram(s) IV Push once  dextrose 50% Injectable 25 Gram(s) IV Push once  dextrose 50% Injectable 25 Gram(s) IV Push once  doxazosin 2 milliGRAM(s) Oral at bedtime  furosemide   Injectable 40 milliGRAM(s) IV Push every 12 hours  glucagon  Injectable 1 milliGRAM(s) IntraMuscular once PRN  heparin  Injectable 5000 Unit(s) SubCutaneous every 12 hours  hydrALAZINE 75 milliGRAM(s) Oral <User Schedule>  insulin glargine Injectable (LANTUS) 5 Unit(s) SubCutaneous at bedtime  insulin lispro (HumaLOG) corrective regimen sliding scale   SubCutaneous three times a day before meals  insulin lispro (HumaLOG) corrective regimen sliding scale   SubCutaneous at bedtime  metoprolol tartrate 50 milliGRAM(s) Oral <User Schedule>  NIFEdipine XL 90 milliGRAM(s) Oral <User Schedule>  pantoprazole    Tablet 40 milliGRAM(s) Oral before breakfast  senna 2 Tablet(s) Oral at bedtime  sertraline 100 milliGRAM(s) Oral daily  simvastatin 20 milliGRAM(s) Oral at bedtime      Vitals:  ICU Vital Signs Last 24 Hrs  T(C): 36.6 (07 Dec 2018 05:13), Max: 36.8 (06 Dec 2018 12:28)  T(F): 97.9 (07 Dec 2018 05:13), Max: 98.3 (06 Dec 2018 12:28)  HR: 75 (07 Dec 2018 05:13) (70 - 85)  BP: 152/61 (07 Dec 2018 05:13) (122/60 - 152/61)  RR: 20 (07 Dec 2018 05:13) (18 - 20)  SpO2: 97% (07 Dec 2018 05:13) (95% - 97%)       Physical Exam:  General: mild dyspnea  Eye: PERRL, EOMI  HENT: Normal oral mucosa NC/AT  CV: Normal S1/S2, RRR, 3/6 systolic murmur, no edema, no elevation in JVP  Resp: Increased work of breathing, coarse breath sounds, no wheezing, no crackles  Abd: Soft, Non-tender, Non-distended, BS+  Ext: No clubbing, No joint deformity   Neuro: Non-focal, motor and sensory intact  Lymph: No lymphadenopathy  Psych: AAOx3, Mood & affect NOT appropriate  Skin: No rashes, No ecchymoses, No cyanosis         I&O's Summary    05 Dec 2018 07:01  -  06 Dec 2018 07:00  --------------------------------------------------------  IN: 730 mL / OUT: 750 mL / NET: -20 mL    06 Dec 2018 07:01  -  07 Dec 2018 06:17  --------------------------------------------------------  IN: 420 mL / OUT: 700 mL / NET: -280 mL      Labs:                      7.8    3.78  )-----------( 120      ( 06 Dec 2018 09:09 )             25.1     12-06  146<H>  |  106  |  68<H>  ----------------------------<  194<H>  3.2<L>   |  24  |  2.31<H>    Ca    9.2      06 Dec 2018 07:14  Mg     2.4     12-06    Serum Pro-Brain Natriuretic Peptide: 3765 pg/mL (12-02 @ 16:42)    Culture - Blood (collected 12-02-18 @ 20:39)  Source: .Blood Blood-Venous  Preliminary Report (12-03-18 @ 21:01):    No growth to date.    Culture - Blood (collected 12-02-18 @ 20:38)  Source: .Blood Blood-Peripheral  Preliminary Report (12-03-18 @ 21:01):    No growth to date.

## 2018-12-07 NOTE — GOALS OF CARE CONVERSATION - PERSONAL ADVANCE DIRECTIVE - CONVERSATION DETAILS
Met with pt and son Gino at bedside, spoke with dtrs Mary and Estelle by phone.  Pt requested that dtr Mary sign consents on his behalf.  Will meet with Mary this evening to obtain consents or leave them at bedside if she is not available.  Mary requested delivery of DME Monday a.m. to allow family to prepare home.  Family expects pt's discharge home Monday p.m.

## 2018-12-07 NOTE — PROGRESS NOTE ADULT - NSHPATTENDINGPLANDISCUSS_GEN_ALL_CORE
Plan discussed with cardiology fellow, Dr. NAYAN Brown; patient seen and examined.       I was physically present for the key portions of the evaluation and management (E/M) service provided.    I agree with the above history, physical, and plan which I have reviewed and edited where appropriate.
Plan discussed with cardiology fellow, Dr. NAYAN Lazar; patient seen and examined.       I was physically present for the key portions of the evaluation and management (E/M) service provided.    I agree with the above history, physical, and plan which I have reviewed and edited where appropriate.
Plan discussed with cardiology fellow, Dr. NAYAN Brown; patient seen and examined.       I was physically present for the key portions of the evaluation and management (E/M) service provided.    I agree with the above history, physical, and plan which I have reviewed and edited where appropriate.

## 2018-12-07 NOTE — PROGRESS NOTE ADULT - ASSESSMENT
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  P/W worsening SOB.      REC:  - relatively stable at this time, however, poor prognosis  - c/w IV Lasix to address symptom  - c/w nifedipine to 90 daily and hydralazine to 75 tid  - palliative care consult       Rodolfo Lazar, #[id number]  Cardiology Fellow    Brendan Bazzi M.D.  Cardiology Consult Service  [phone]

## 2018-12-07 NOTE — PROGRESS NOTE ADULT - ASSESSMENT
The patient is an 83-year-old man with PMH of HTN, HLD, Type 2 DM, CAD s/p 6 stents (last stent 6 years ago), HFpEF (EF 75%), and CKD stage 4, presented with shortness of breath for 4 days, found to have pulmonary edema concerning for decompensated heart failure in setting of mitral valve insufficiency.  Recently admitted, found to have severe MR with torn chordae.  Seen by structural heart team and CT surgery last admission, plan initially was for outpatient follow-up for Mitraclip.

## 2018-12-07 NOTE — PROGRESS NOTE ADULT - PROBLEM SELECTOR PLAN 5
Patient recommend for honey-thick liquids.  Per patient wishes, ordered for nectar-thick with thin liquids per patient request for comfort.  Patient and family explained and understand risks of aspiration.

## 2018-12-07 NOTE — PROGRESS NOTE ADULT - PROBLEM SELECTOR PLAN 1
Patient presenting with worsening dyspnea with pulmonary edema secondary to acute on chronic HFpEF in the setting of known severe MR as well as moderate to severe aortic regurgitation with severe pulm HTN.  Leg edema improved with IV Lasix and now without crackles on exam, but still reports orthopnea.  Per Dr. Bazzi who spoke with CT surgery, patient is not a candidate for an intervention on the mitral valve.  Continue supplemental oxygen, IV Lasix BID.  Palliative care consult pending.  DNR order placed and form signed per patient's wishes.

## 2018-12-07 NOTE — CONSULT NOTE ADULT - PROBLEM SELECTOR RECOMMENDATION 2
Continue Lantus 5 units qhs.  Continue low dose sliding scale coverage.  Continue to monitor blood sugars.

## 2018-12-07 NOTE — CONSULT NOTE ADULT - SUBJECTIVE AND OBJECTIVE BOX
HPI:  83M with PMH HTN, HLD, T2DM, CAD s/p 6 stents (last stent 6 years ago), HFpEF (EF 75%, and CKD stage 4, presents with shortness of breath for 4 days. He endorses dyspnea on exertion with minimal ambulation, orthopnea, and worsening lower extremity edema. He has chronic right sided chest pain which radiated to the arm. He denies fevers, chills, productive cough, diarrhea or dysuria. No sick contacts. Patient was recently discharged from [facility] for similar symptoms on 11/20/2018 during which inpatient workup of heart failure exacerbation revealed: cardiac catheterization without hemodynamically significant stenosis; MARBIN showed severe mitral regurg with torn chordae, moderate-severe aortic regurgitation and severe pulmonary hypertension. He was medically optimized and evaluated by CT surgery for valve repair which was deferred as patient clinically improved.    In ED, T 98.2, HR 82, BP: 129/52, and 91 % on RA. Labs notable for 9.4 (baseline 8-9s), Cr 2.16 (improved 2.32 on 11/2018), proBNP 3000, and lactate 1.3. Troponin 36. RVP neg. CXR with hilar congestion. He was given lasix 80 x 1 (02 Dec 2018 19:12)    PERTINENT PM/SXH:   Heart failure with preserved ejection fraction  HTN (hypertension)  CHF (congestive heart failure)  T2DM (type 2 diabetes mellitus)  HLD (hyperlipidemia)    CAD S/P percutaneous coronary angioplasty  No significant past surgical history    FAMILY HISTORY:  No pertinent family history in first degree relatives: No hx of CAD in his children    ITEMS NOT CHECKED ARE NOT PRESENT    SOCIAL HISTORY:   Significant other/partner:  [x ]  Children: Mary Verdin, Daughter  [ ]  Latter day/Spirituality:  Substance hx:  [x ]   Tobacco hx: Former smoker, quit in 2000 Alcohol hx: [x ] less than 3 drinks per week   Home Opioid hx:  [ ] I-Stop Reference No:  Living Situation: [x ]Home. Lives with daughterMary  [ ]Long term care  [ ]Rehab [ ]Other    ADVANCE DIRECTIVES:    DNR  Yes DNI Yes  MOLST  [ ]  Living Will  [ ]   DECISION MAKER(s):  [ ] Health Care Proxy(s)  [ ] Surrogate(s)  [ ] Guardian           Name(s): Phone Number(s):    BASELINE (I)ADL(s) (prior to admission):  [place]: [ ]Total  [x ] Moderate [ ]Dependent    Allergies    No Known Allergies    Intolerances    MEDICATIONS  (STANDING):  aspirin enteric coated 81 milliGRAM(s) Oral daily  buDESOnide 160 MICROgram(s)/formoterol 4.5 MICROgram(s) Inhaler 2 Puff(s) Inhalation two times a day  clopidogrel Tablet 75 milliGRAM(s) Oral daily  dextrose 5%. 1000 milliLiter(s) (50 mL/Hr) IV Continuous <Continuous>  dextrose 50% Injectable 12.5 Gram(s) IV Push once  dextrose 50% Injectable 25 Gram(s) IV Push once  dextrose 50% Injectable 25 Gram(s) IV Push once  doxazosin 2 milliGRAM(s) Oral at bedtime  furosemide   Injectable 40 milliGRAM(s) IV Push every 12 hours  heparin  Injectable 5000 Unit(s) SubCutaneous every 12 hours  hydrALAZINE 75 milliGRAM(s) Oral <User Schedule>  insulin glargine Injectable (LANTUS) 5 Unit(s) SubCutaneous at bedtime  insulin lispro (HumaLOG) corrective regimen sliding scale   SubCutaneous three times a day before meals  insulin lispro (HumaLOG) corrective regimen sliding scale   SubCutaneous at bedtime  metoprolol tartrate 50 milliGRAM(s) Oral <User Schedule>  NIFEdipine XL 90 milliGRAM(s) Oral <User Schedule>  pantoprazole    Tablet 40 milliGRAM(s) Oral before breakfast  potassium chloride    Tablet ER 20 milliEquivalent(s) Oral every 2 hours  senna 2 Tablet(s) Oral at bedtime  sertraline 100 milliGRAM(s) Oral daily  simvastatin 20 milliGRAM(s) Oral at bedtime    MEDICATIONS  (PRN):  acetaminophen   Tablet .. 650 milliGRAM(s) Oral every 6 hours PRN Moderate Pain (4 - 6)  dextrose 40% Gel 15 Gram(s) Oral once PRN Blood Glucose LESS THAN 70 milliGRAM(s)/deciliter  glucagon  Injectable 1 milliGRAM(s) IntraMuscular once PRN Glucose LESS THAN 70 milligrams/deciliter    PRESENT SYMPTOMS: [ ]Unable to obtain due to poor mentation   Source if other than patient:  [ ]Family   [ ]Team     Pain (Impact on QOL):    Location -         Minimal acceptable level (0-10 scale):                    Aggrevating factors -  Quality -  Radiation -  Severity (0-10 scale) -    Timing -    PAIN AD Score:     [email or web address] (press ctrl +  left click to view)    Dyspnea:                           [ ]Mild [ x]Moderate [ x]Severe  Anxiety:                             [ ]Mild [ ]Moderate [ ]Severe  Fatigue:                             [ ]Mild [ x]Moderate [ ]Severe  Nausea:                             [ ]Mild [ ]Moderate [ ]Severe  Loss of appetite:              [ ]Mild [ ]Moderate [ ]Severe  Constipation:                    [ ]Mild [ ]Moderate [ ]Severe    Other Symptoms:  [x ]All other review of systems negative     Karnofsky Performance Score/Palliative Performance Status Version 2:         %  PHYSICAL EXAM:  Vital Signs Last 24 Hrs  T(C): 36.5 (07 Dec 2018 09:17), Max: 36.6 (07 Dec 2018 05:13)  T(F): 97.7 (07 Dec 2018 09:17), Max: 97.9 (07 Dec 2018 05:13)  HR: 70 (07 Dec 2018 09:17) (70 - 85)  BP: 139/65 (07 Dec 2018 09:17) (122/60 - 152/61)  BP(mean): --  RR: 18 (07 Dec 2018 09:17) (18 - 20)  SpO2: 97% (07 Dec 2018 09:17) (95% - 97%) I&O's Summary    06 Dec 2018 07:01  -  07 Dec 2018 07:00  --------------------------------------------------------  IN: 420 mL / OUT: 700 mL / NET: -280 mL    07 Dec 2018 07:01  -  07 Dec 2018 12:45  --------------------------------------------------------  IN: 120 mL / OUT: 400 mL / NET: -280 mL    GENERAL:  [x ]Alert  [x ]Oriented x 3   [ ]Lethargic  [ ]Cachexia  [ ]Unarousable  [ ]Verbal  [ ]Non-Verbal  Behavioral:   [ ] Anxiety  [ ] Delirium [ ] Agitation [ ] Other  HEENT:  [x ]Normal   [ ]Dry mouth   [ ]ET Tube/Trach  [ ]Oral lesions  PULMONARY:   Increase work of Breathing. Decreased breath sounds of lower base. No wheezing, crackles. On 4L NC   CARDIOVASCULAR:    [x ]Regular [ ]Irregular [ ]Tachy  [ ]Dale [x ]Murmur; 3/6 Systolic Murmur [ ]Other  GASTROINTESTINAL:  [x ]Soft  [ ]Distended   [x ]+BS  [x ]Non tender [ ]Tender  [ ]PEG [ ]OGT/ NGT  Last BM:   GENITOURINARY:  [ ]Normal [ ] Incontinent   [ ]Oliguria/Anuria   [ ]Roberts  MUSCULOSKELETAL:   [ ]Normal   [x ]Weakness  [ ]Bed/Wheelchair bound [ ]Edema  NEUROLOGIC:   [x ]No focal deficits  [x ] Cognitive impairment  [ ] Dysphagia [ ]Dysarthria [ ] Paresis [ ]Other   SKIN:   [ x]Normal   [ ]Pressure ulcer(s)  [ ]Rash    CRITICAL CARE:  [ ] Shock Present  [ ]Septic [ ]Cardiogenic [ ]Neurologic [ ]Hypovolemic  [ ]  Vasopressors [ ]  Inotropes   [ ] Respiratory failure present  [ ] Acute  [ ] Chronic [ ] Hypoxic  [ ] Hypercarbic [ ] Other  [ ] Other organ failure     LABS:                        7.8    3.57  )-----------( 124      ( 07 Dec 2018 09:20 )             25.3   12-07    144  |  104  |  69<H>  ----------------------------<  150<H>  3.4<L>   |  24  |  2.35<H>    Ca    9.2      07 Dec 2018 07:26  Mg     2.3     12-07          RADIOLOGY & ADDITIONAL STUDIES:    PROTEIN CALORIE MALNUTRITION:   [ ] PPSV2 < or = to 30% [ ] significant weight loss  [ ] poor nutritional intake [ ] catabolic state [ ] anasarca     Albumin, Serum: 3.4 g/dL (12-03-18 @ 07:04)  Artificial Nutrition [ ]     REFERRALS:   [ ]Chaplaincy  [ ] Hospice  [ ]Child Life  [ ]Social Work  [ ]Case management [ ]Holistic Therapy   Goals of Care Discussion Document: HPI:  83M with PMH HTN, HLD, T2DM, CAD s/p 6 stents (last stent 6 years ago), HFpEF (EF 75%, and CKD stage 4, presents with shortness of breath for 4 days. He endorses dyspnea on exertion with minimal ambulation, orthopnea, and worsening lower extremity edema. He has chronic right sided chest pain which radiated to the arm. He denies fevers, chills, productive cough, diarrhea or dysuria. No sick contacts. Patient was recently discharged from [facility] for similar symptoms on 11/20/2018 during which inpatient workup of heart failure exacerbation revealed: cardiac catheterization without hemodynamically significant stenosis; MARBIN showed severe mitral regurg with torn chordae, moderate-severe aortic regurgitation and severe pulmonary hypertension. He was medically optimized and evaluated by CT surgery for valve repair which was deferred as patient clinically improved.    In ED, T 98.2, HR 82, BP: 129/52, and 91 % on RA. Labs notable for 9.4 (baseline 8-9s), Cr 2.16 (improved 2.32 on 11/2018), proBNP 3000, and lactate 1.3. Troponin 36. RVP neg. CXR with hilar congestion. He was given lasix 80 x 1 (02 Dec 2018 19:12)    PERTINENT PM/SXH:   Heart failure with preserved ejection fraction  HTN (hypertension)  CHF (congestive heart failure)  T2DM (type 2 diabetes mellitus)  HLD (hyperlipidemia)    CAD S/P percutaneous coronary angioplasty  No significant past surgical history    FAMILY HISTORY:  No pertinent family history in first degree relatives: No hx of CAD in his children    ITEMS NOT CHECKED ARE NOT PRESENT    SOCIAL HISTORY:   Significant other/partner:  [x ]  Children: Mary Verdin, Daughter  [ ]  Buddhist/Spirituality:  Substance hx:  [x ]   Tobacco hx: Former smoker, quit in 2000 Alcohol hx: [x ] less than 3 drinks per week   Home Opioid hx:  [ ] I-Stop Reference No:  Living Situation: [x ]Home. Lives with daughterMary  [ ]Long term care  [ ]Rehab [ ]Other    ADVANCE DIRECTIVES:    DNR  Yes DNI Yes  MOLST  [ ]  Living Will  [ ]   DECISION MAKER(s):  [ ] Health Care Proxy(s)  [ ] Surrogate(s)  [ ] Guardian           Name(s): Phone Number(s):    BASELINE (I)ADL(s) (prior to admission):  [place]: [ ]Total  [x ] Moderate [ ]Dependent    Allergies    No Known Allergies    Intolerances    MEDICATIONS  (STANDING):  aspirin enteric coated 81 milliGRAM(s) Oral daily  buDESOnide 160 MICROgram(s)/formoterol 4.5 MICROgram(s) Inhaler 2 Puff(s) Inhalation two times a day  clopidogrel Tablet 75 milliGRAM(s) Oral daily  dextrose 5%. 1000 milliLiter(s) (50 mL/Hr) IV Continuous <Continuous>  dextrose 50% Injectable 12.5 Gram(s) IV Push once  dextrose 50% Injectable 25 Gram(s) IV Push once  dextrose 50% Injectable 25 Gram(s) IV Push once  doxazosin 2 milliGRAM(s) Oral at bedtime  furosemide   Injectable 40 milliGRAM(s) IV Push every 12 hours  heparin  Injectable 5000 Unit(s) SubCutaneous every 12 hours  hydrALAZINE 75 milliGRAM(s) Oral <User Schedule>  insulin glargine Injectable (LANTUS) 5 Unit(s) SubCutaneous at bedtime  insulin lispro (HumaLOG) corrective regimen sliding scale   SubCutaneous three times a day before meals  insulin lispro (HumaLOG) corrective regimen sliding scale   SubCutaneous at bedtime  metoprolol tartrate 50 milliGRAM(s) Oral <User Schedule>  NIFEdipine XL 90 milliGRAM(s) Oral <User Schedule>  pantoprazole    Tablet 40 milliGRAM(s) Oral before breakfast  potassium chloride    Tablet ER 20 milliEquivalent(s) Oral every 2 hours  senna 2 Tablet(s) Oral at bedtime  sertraline 100 milliGRAM(s) Oral daily  simvastatin 20 milliGRAM(s) Oral at bedtime    MEDICATIONS  (PRN):  acetaminophen   Tablet .. 650 milliGRAM(s) Oral every 6 hours PRN Moderate Pain (4 - 6)  dextrose 40% Gel 15 Gram(s) Oral once PRN Blood Glucose LESS THAN 70 milliGRAM(s)/deciliter  glucagon  Injectable 1 milliGRAM(s) IntraMuscular once PRN Glucose LESS THAN 70 milligrams/deciliter    PRESENT SYMPTOMS: [ ]Unable to obtain due to poor mentation   Source if other than patient:  [ ]Family   [ ]Team     Pain (Impact on QOL):    Location -         Minimal acceptable level (0-10 scale):                    Aggrevating factors -  Quality -  Radiation -  Severity (0-10 scale) -    Timing -    PAIN AD Score:     [email or web address] (press ctrl +  left click to view)    Dyspnea:                           [ ]Mild [ x]Moderate [ x]Severe  Anxiety:                             [ ]Mild [ ]Moderate [ ]Severe  Fatigue:                             [ ]Mild [ x]Moderate [ ]Severe  Nausea:                             [ ]Mild [ ]Moderate [ ]Severe  Loss of appetite:              [ ]Mild [ ]Moderate [ ]Severe  Constipation:                    [ ]Mild [ ]Moderate [ ]Severe    Other Symptoms:  [x ]All other review of systems negative     Karnofsky Performance Score/Palliative Performance Status Version 2:  40      %  PHYSICAL EXAM:  Vital Signs Last 24 Hrs  T(C): 36.5 (07 Dec 2018 09:17), Max: 36.6 (07 Dec 2018 05:13)  T(F): 97.7 (07 Dec 2018 09:17), Max: 97.9 (07 Dec 2018 05:13)  HR: 70 (07 Dec 2018 09:17) (70 - 85)  BP: 139/65 (07 Dec 2018 09:17) (122/60 - 152/61)  BP(mean): --  RR: 18 (07 Dec 2018 09:17) (18 - 20)  SpO2: 97% (07 Dec 2018 09:17) (95% - 97%) I&O's Summary    06 Dec 2018 07:01  -  07 Dec 2018 07:00  --------------------------------------------------------  IN: 420 mL / OUT: 700 mL / NET: -280 mL    07 Dec 2018 07:01  -  07 Dec 2018 12:45  --------------------------------------------------------  IN: 120 mL / OUT: 400 mL / NET: -280 mL    GENERAL:  [x ]Alert  [x ]Oriented x 3   [ ]Lethargic  [ ]Cachexia  [ ]Unarousable  [ ]Verbal  [ ]Non-Verbal  Behavioral:   [ ] Anxiety  [ ] Delirium [ ] Agitation [ ] Other  HEENT:  [x ]Normal   [ ]Dry mouth   [ ]ET Tube/Trach  [ ]Oral lesions  PULMONARY:   Increase work of Breathing. Decreased breath sounds of lower base. No wheezing, crackles. On 4L NC   CARDIOVASCULAR:    [x ]Regular [ ]Irregular [ ]Tachy  [ ]Dale [x ]Murmur; 3/6 Systolic Murmur [ ]Other  GASTROINTESTINAL:  [x ]Soft  [ ]Distended   [x ]+BS  [x ]Non tender [ ]Tender  [ ]PEG [ ]OGT/ NGT  Last BM:   GENITOURINARY:  [ ]Normal [ ] Incontinent   [ ]Oliguria/Anuria   [ ]Roberts  MUSCULOSKELETAL:   [ ]Normal   [x ]Weakness  [ ]Bed/Wheelchair bound [ ]Edema  NEUROLOGIC:   [x ]No focal deficits  [x ] Cognitive impairment  [ ] Dysphagia [ ]Dysarthria [ ] Paresis [ ]Other   SKIN:   [ x]Normal   [ ]Pressure ulcer(s)  [ ]Rash    CRITICAL CARE:  [ ] Shock Present  [ ]Septic [ ]Cardiogenic [ ]Neurologic [ ]Hypovolemic  [ ]  Vasopressors [ ]  Inotropes   [ ] Respiratory failure present  [ ] Acute  [ ] Chronic [ ] Hypoxic  [ ] Hypercarbic [ ] Other  [ ] Other organ failure     LABS:                        7.8    3.57  )-----------( 124      ( 07 Dec 2018 09:20 )             25.3   12-07    144  |  104  |  69<H>  ----------------------------<  150<H>  3.4<L>   |  24  |  2.35<H>    Ca    9.2      07 Dec 2018 07:26  Mg     2.3     12-07          RADIOLOGY & ADDITIONAL STUDIES:    PROTEIN CALORIE MALNUTRITION:   [ ] PPSV2 < or = to 30% [ ] significant weight loss  [ ] poor nutritional intake [ ] catabolic state [ ] anasarca     Albumin, Serum: 3.4 g/dL (12-03-18 @ 07:04)  Artificial Nutrition [ ]     REFERRALS:   [ ]Chaplaincy  [ ] Hospice  [ ]Child Life  [ ]Social Work  [ ]Case management [ ]Holistic Therapy   Goals of Care Discussion Document:

## 2018-12-07 NOTE — CONSULT NOTE ADULT - PROBLEM SELECTOR RECOMMENDATION 9
Tylenol prn Patient is not a candidate for an intervention on the mitral valve.  Continue supplemental oxygen, IV Lasix BID as per Primary Team

## 2018-12-08 LAB
ANION GAP SERPL CALC-SCNC: 14 MMOL/L — SIGNIFICANT CHANGE UP (ref 5–17)
BUN SERPL-MCNC: 78 MG/DL — HIGH (ref 7–23)
CALCIUM SERPL-MCNC: 9.6 MG/DL — SIGNIFICANT CHANGE UP (ref 8.4–10.5)
CHLORIDE SERPL-SCNC: 100 MMOL/L — SIGNIFICANT CHANGE UP (ref 96–108)
CO2 SERPL-SCNC: 23 MMOL/L — SIGNIFICANT CHANGE UP (ref 22–31)
CREAT SERPL-MCNC: 2.51 MG/DL — HIGH (ref 0.5–1.3)
GLUCOSE BLDC GLUCOMTR-MCNC: 157 MG/DL — HIGH (ref 70–99)
GLUCOSE BLDC GLUCOMTR-MCNC: 175 MG/DL — HIGH (ref 70–99)
GLUCOSE BLDC GLUCOMTR-MCNC: 207 MG/DL — HIGH (ref 70–99)
GLUCOSE BLDC GLUCOMTR-MCNC: 211 MG/DL — HIGH (ref 70–99)
GLUCOSE SERPL-MCNC: 227 MG/DL — HIGH (ref 70–99)
HCT VFR BLD CALC: 25.2 % — LOW (ref 39–50)
HGB BLD-MCNC: 7.7 G/DL — LOW (ref 13–17)
MAGNESIUM SERPL-MCNC: 2.3 MG/DL — SIGNIFICANT CHANGE UP (ref 1.6–2.6)
MCHC RBC-ENTMCNC: 27.4 PG — SIGNIFICANT CHANGE UP (ref 27–34)
MCHC RBC-ENTMCNC: 30.6 GM/DL — LOW (ref 32–36)
MCV RBC AUTO: 89.7 FL — SIGNIFICANT CHANGE UP (ref 80–100)
PLATELET # BLD AUTO: 141 K/UL — LOW (ref 150–400)
POTASSIUM SERPL-MCNC: 3.8 MMOL/L — SIGNIFICANT CHANGE UP (ref 3.5–5.3)
POTASSIUM SERPL-SCNC: 3.8 MMOL/L — SIGNIFICANT CHANGE UP (ref 3.5–5.3)
RBC # BLD: 2.81 M/UL — LOW (ref 4.2–5.8)
RBC # FLD: 15.8 % — HIGH (ref 10.3–14.5)
SODIUM SERPL-SCNC: 137 MMOL/L — SIGNIFICANT CHANGE UP (ref 135–145)
WBC # BLD: 4.28 K/UL — SIGNIFICANT CHANGE UP (ref 3.8–10.5)
WBC # FLD AUTO: 4.28 K/UL — SIGNIFICANT CHANGE UP (ref 3.8–10.5)

## 2018-12-08 PROCEDURE — 99233 SBSQ HOSP IP/OBS HIGH 50: CPT

## 2018-12-08 RX ADMIN — BUDESONIDE AND FORMOTEROL FUMARATE DIHYDRATE 2 PUFF(S): 160; 4.5 AEROSOL RESPIRATORY (INHALATION) at 08:24

## 2018-12-08 RX ADMIN — Medication 1: at 08:50

## 2018-12-08 RX ADMIN — Medication 2 MILLIGRAM(S): at 21:47

## 2018-12-08 RX ADMIN — CLOPIDOGREL BISULFATE 75 MILLIGRAM(S): 75 TABLET, FILM COATED ORAL at 12:21

## 2018-12-08 RX ADMIN — INSULIN GLARGINE 5 UNIT(S): 100 INJECTION, SOLUTION SUBCUTANEOUS at 22:30

## 2018-12-08 RX ADMIN — Medication 50 MILLIGRAM(S): at 12:21

## 2018-12-08 RX ADMIN — BUDESONIDE AND FORMOTEROL FUMARATE DIHYDRATE 2 PUFF(S): 160; 4.5 AEROSOL RESPIRATORY (INHALATION) at 17:22

## 2018-12-08 RX ADMIN — Medication 40 MILLIGRAM(S): at 17:22

## 2018-12-08 RX ADMIN — OXYCODONE HYDROCHLORIDE 5 MILLIGRAM(S): 5 TABLET ORAL at 08:55

## 2018-12-08 RX ADMIN — OXYCODONE HYDROCHLORIDE 5 MILLIGRAM(S): 5 TABLET ORAL at 08:25

## 2018-12-08 RX ADMIN — SERTRALINE 100 MILLIGRAM(S): 25 TABLET, FILM COATED ORAL at 12:22

## 2018-12-08 RX ADMIN — Medication 75 MILLIGRAM(S): at 13:24

## 2018-12-08 RX ADMIN — PANTOPRAZOLE SODIUM 40 MILLIGRAM(S): 20 TABLET, DELAYED RELEASE ORAL at 08:24

## 2018-12-08 RX ADMIN — Medication 75 MILLIGRAM(S): at 20:23

## 2018-12-08 RX ADMIN — Medication 81 MILLIGRAM(S): at 12:21

## 2018-12-08 RX ADMIN — Medication 1: at 17:23

## 2018-12-08 RX ADMIN — HEPARIN SODIUM 5000 UNIT(S): 5000 INJECTION INTRAVENOUS; SUBCUTANEOUS at 08:24

## 2018-12-08 RX ADMIN — Medication 90 MILLIGRAM(S): at 08:24

## 2018-12-08 RX ADMIN — OXYCODONE HYDROCHLORIDE 5 MILLIGRAM(S): 5 TABLET ORAL at 21:45

## 2018-12-08 RX ADMIN — SIMVASTATIN 20 MILLIGRAM(S): 20 TABLET, FILM COATED ORAL at 21:47

## 2018-12-08 RX ADMIN — Medication 2: at 13:24

## 2018-12-08 RX ADMIN — Medication 40 MILLIGRAM(S): at 08:24

## 2018-12-08 RX ADMIN — OXYCODONE HYDROCHLORIDE 5 MILLIGRAM(S): 5 TABLET ORAL at 20:23

## 2018-12-08 RX ADMIN — Medication 75 MILLIGRAM(S): at 08:37

## 2018-12-08 RX ADMIN — Medication 50 MILLIGRAM(S): at 20:23

## 2018-12-08 RX ADMIN — HEPARIN SODIUM 5000 UNIT(S): 5000 INJECTION INTRAVENOUS; SUBCUTANEOUS at 17:22

## 2018-12-08 NOTE — PROGRESS NOTE ADULT - PROBLEM SELECTOR PLAN 2
Hemoglobin A1C 4.7 in November.  Appeared during prior admission to have steroid induced hyperglycemia while being treat for gout flare, Prednisone taper completed on 12/4..  When patient first presented in early November, was taking Lantus 8units qhs while not on steroids.  Continue Lantus 5 units qhs.  Continue low dose sliding scale coverage.  Continue to monitor blood sugars.

## 2018-12-08 NOTE — PROGRESS NOTE ADULT - SUBJECTIVE AND OBJECTIVE BOX
Patient is a 83y old  Male who presents with a chief complaint of shortness of breath x 4 days (07 Dec 2018 12:44)      SUBJECTIVE / OVERNIGHT EVENTS: No acute events overnight     MEDICATIONS  (STANDING):  aspirin enteric coated 81 milliGRAM(s) Oral daily  buDESOnide 160 MICROgram(s)/formoterol 4.5 MICROgram(s) Inhaler 2 Puff(s) Inhalation two times a day  clopidogrel Tablet 75 milliGRAM(s) Oral daily  dextrose 5%. 1000 milliLiter(s) (50 mL/Hr) IV Continuous <Continuous>  dextrose 50% Injectable 12.5 Gram(s) IV Push once  dextrose 50% Injectable 25 Gram(s) IV Push once  dextrose 50% Injectable 25 Gram(s) IV Push once  doxazosin 2 milliGRAM(s) Oral at bedtime  furosemide   Injectable 40 milliGRAM(s) IV Push every 12 hours  heparin  Injectable 5000 Unit(s) SubCutaneous every 12 hours  hydrALAZINE 75 milliGRAM(s) Oral <User Schedule>  insulin glargine Injectable (LANTUS) 5 Unit(s) SubCutaneous at bedtime  insulin lispro (HumaLOG) corrective regimen sliding scale   SubCutaneous three times a day before meals  insulin lispro (HumaLOG) corrective regimen sliding scale   SubCutaneous at bedtime  metoprolol tartrate 50 milliGRAM(s) Oral <User Schedule>  NIFEdipine XL 90 milliGRAM(s) Oral <User Schedule>  pantoprazole    Tablet 40 milliGRAM(s) Oral before breakfast  senna 2 Tablet(s) Oral at bedtime  sertraline 100 milliGRAM(s) Oral daily  simvastatin 20 milliGRAM(s) Oral at bedtime    MEDICATIONS  (PRN):  acetaminophen   Tablet .. 650 milliGRAM(s) Oral every 6 hours PRN Moderate Pain (4 - 6)  dextrose 40% Gel 15 Gram(s) Oral once PRN Blood Glucose LESS THAN 70 milliGRAM(s)/deciliter  glucagon  Injectable 1 milliGRAM(s) IntraMuscular once PRN Glucose LESS THAN 70 milligrams/deciliter  oxyCODONE    Solution 5 milliGRAM(s) Oral every 3 hours PRN Moderate Pain (4 - 6)/dyspnea        CAPILLARY BLOOD GLUCOSE      POCT Blood Glucose.: 207 mg/dL (08 Dec 2018 12:37)  POCT Blood Glucose.: 175 mg/dL (08 Dec 2018 08:48)  POCT Blood Glucose.: 158 mg/dL (07 Dec 2018 22:15)  POCT Blood Glucose.: 180 mg/dL (07 Dec 2018 17:46)    I&O's Summary    07 Dec 2018 07:01  -  08 Dec 2018 07:00  --------------------------------------------------------  IN: 680 mL / OUT: 1300 mL / NET: -620 mL    08 Dec 2018 07:01  -  08 Dec 2018 16:53  --------------------------------------------------------  IN: 720 mL / OUT: 375 mL / NET: 345 mL        PHYSICAL EXAM:  GENERAL: NAD, frail   HEAD:  Atraumatic, Normocephalic  EYES: conjunctiva and sclera clear  NECK:  No JVD  CHEST/LUNG: crackles on bases   HEART: Regular rate and rhythm; S1S2  ABDOMEN: Soft, Nontender, Nondistended; Bowel sounds present  EXTREMITIES:  2+ Peripheral Pulses, No clubbing, cyanosis, or edema  SKIN: No rashes or lesions    LABS:                        7.7    4.28  )-----------( 141      ( 08 Dec 2018 11:55 )             25.2     12-08    137  |  100  |  78<H>  ----------------------------<  227<H>  3.8   |  23  |  2.51<H>    Ca    9.6      08 Dec 2018 10:25  Mg     2.3     12-08                RADIOLOGY & ADDITIONAL TESTS:    Imaging Personally Reviewed:    Consultant(s) Notes Reviewed:      Care Discussed with Consultants/Other Providers:

## 2018-12-08 NOTE — PROGRESS NOTE ADULT - PROBLEM SELECTOR PLAN 5
Patient recommend for honey-thick liquids.  Per patient wishes, ordered for nectar-thick with thin liquids per patient request for comfort.

## 2018-12-08 NOTE — PROGRESS NOTE ADULT - PROBLEM SELECTOR PLAN 1
Patient presenting with worsening dyspnea with pulmonary edema secondary to acute on chronic HFpEF in the setting of known severe MR as well as moderate to severe aortic regurgitation with severe pulm HTN.  Leg edema improved with IV Lasix and now without crackles on exam, but still reports orthopnea.  Per Dr. Bazzi who spoke with CT surgery, patient is not a candidate for an intervention on the mitral valve.  Continue supplemental oxygen, IV Lasix BID.  DNR order placed and form signed per patient's wishes.  plan is for discharge home with hospice   pending O2 set up at home

## 2018-12-09 LAB
ANION GAP SERPL CALC-SCNC: 15 MMOL/L — SIGNIFICANT CHANGE UP (ref 5–17)
BUN SERPL-MCNC: 79 MG/DL — HIGH (ref 7–23)
CALCIUM SERPL-MCNC: 9.5 MG/DL — SIGNIFICANT CHANGE UP (ref 8.4–10.5)
CHLORIDE SERPL-SCNC: 101 MMOL/L — SIGNIFICANT CHANGE UP (ref 96–108)
CO2 SERPL-SCNC: 26 MMOL/L — SIGNIFICANT CHANGE UP (ref 22–31)
CREAT SERPL-MCNC: 2.55 MG/DL — HIGH (ref 0.5–1.3)
GLUCOSE BLDC GLUCOMTR-MCNC: 148 MG/DL — HIGH (ref 70–99)
GLUCOSE BLDC GLUCOMTR-MCNC: 179 MG/DL — HIGH (ref 70–99)
GLUCOSE BLDC GLUCOMTR-MCNC: 209 MG/DL — HIGH (ref 70–99)
GLUCOSE BLDC GLUCOMTR-MCNC: 214 MG/DL — HIGH (ref 70–99)
GLUCOSE SERPL-MCNC: 159 MG/DL — HIGH (ref 70–99)
HCT VFR BLD CALC: 23.8 % — LOW (ref 39–50)
HGB BLD-MCNC: 7.3 G/DL — LOW (ref 13–17)
MCHC RBC-ENTMCNC: 28 PG — SIGNIFICANT CHANGE UP (ref 27–34)
MCHC RBC-ENTMCNC: 30.7 GM/DL — LOW (ref 32–36)
MCV RBC AUTO: 91.2 FL — SIGNIFICANT CHANGE UP (ref 80–100)
PLATELET # BLD AUTO: 127 K/UL — LOW (ref 150–400)
POTASSIUM SERPL-MCNC: 3.4 MMOL/L — LOW (ref 3.5–5.3)
POTASSIUM SERPL-SCNC: 3.4 MMOL/L — LOW (ref 3.5–5.3)
RBC # BLD: 2.61 M/UL — LOW (ref 4.2–5.8)
RBC # FLD: 15.8 % — HIGH (ref 10.3–14.5)
SODIUM SERPL-SCNC: 142 MMOL/L — SIGNIFICANT CHANGE UP (ref 135–145)
WBC # BLD: 3.69 K/UL — LOW (ref 3.8–10.5)
WBC # FLD AUTO: 3.69 K/UL — LOW (ref 3.8–10.5)

## 2018-12-09 PROCEDURE — 99233 SBSQ HOSP IP/OBS HIGH 50: CPT

## 2018-12-09 RX ORDER — POTASSIUM CHLORIDE 20 MEQ
40 PACKET (EA) ORAL ONCE
Qty: 0 | Refills: 0 | Status: COMPLETED | OUTPATIENT
Start: 2018-12-09 | End: 2018-12-09

## 2018-12-09 RX ADMIN — Medication 75 MILLIGRAM(S): at 06:11

## 2018-12-09 RX ADMIN — Medication 75 MILLIGRAM(S): at 20:35

## 2018-12-09 RX ADMIN — OXYCODONE HYDROCHLORIDE 5 MILLIGRAM(S): 5 TABLET ORAL at 18:59

## 2018-12-09 RX ADMIN — Medication 2 MILLIGRAM(S): at 22:06

## 2018-12-09 RX ADMIN — Medication 1: at 08:25

## 2018-12-09 RX ADMIN — INSULIN GLARGINE 5 UNIT(S): 100 INJECTION, SOLUTION SUBCUTANEOUS at 22:06

## 2018-12-09 RX ADMIN — Medication 40 MILLIEQUIVALENT(S): at 17:38

## 2018-12-09 RX ADMIN — Medication 81 MILLIGRAM(S): at 12:02

## 2018-12-09 RX ADMIN — BUDESONIDE AND FORMOTEROL FUMARATE DIHYDRATE 2 PUFF(S): 160; 4.5 AEROSOL RESPIRATORY (INHALATION) at 17:20

## 2018-12-09 RX ADMIN — Medication 50 MILLIGRAM(S): at 12:02

## 2018-12-09 RX ADMIN — CLOPIDOGREL BISULFATE 75 MILLIGRAM(S): 75 TABLET, FILM COATED ORAL at 12:02

## 2018-12-09 RX ADMIN — OXYCODONE HYDROCHLORIDE 5 MILLIGRAM(S): 5 TABLET ORAL at 17:18

## 2018-12-09 RX ADMIN — Medication 2: at 17:20

## 2018-12-09 RX ADMIN — SENNA PLUS 2 TABLET(S): 8.6 TABLET ORAL at 22:06

## 2018-12-09 RX ADMIN — HEPARIN SODIUM 5000 UNIT(S): 5000 INJECTION INTRAVENOUS; SUBCUTANEOUS at 17:19

## 2018-12-09 RX ADMIN — Medication 40 MILLIGRAM(S): at 17:19

## 2018-12-09 RX ADMIN — Medication 40 MILLIGRAM(S): at 06:11

## 2018-12-09 RX ADMIN — SERTRALINE 100 MILLIGRAM(S): 25 TABLET, FILM COATED ORAL at 12:02

## 2018-12-09 RX ADMIN — Medication 50 MILLIGRAM(S): at 22:06

## 2018-12-09 RX ADMIN — Medication 90 MILLIGRAM(S): at 06:11

## 2018-12-09 RX ADMIN — OXYCODONE HYDROCHLORIDE 5 MILLIGRAM(S): 5 TABLET ORAL at 06:24

## 2018-12-09 RX ADMIN — BUDESONIDE AND FORMOTEROL FUMARATE DIHYDRATE 2 PUFF(S): 160; 4.5 AEROSOL RESPIRATORY (INHALATION) at 06:11

## 2018-12-09 RX ADMIN — PANTOPRAZOLE SODIUM 40 MILLIGRAM(S): 20 TABLET, DELAYED RELEASE ORAL at 06:11

## 2018-12-09 RX ADMIN — SIMVASTATIN 20 MILLIGRAM(S): 20 TABLET, FILM COATED ORAL at 22:06

## 2018-12-09 RX ADMIN — HEPARIN SODIUM 5000 UNIT(S): 5000 INJECTION INTRAVENOUS; SUBCUTANEOUS at 06:11

## 2018-12-09 RX ADMIN — Medication 75 MILLIGRAM(S): at 13:36

## 2018-12-09 NOTE — PROGRESS NOTE ADULT - SUBJECTIVE AND OBJECTIVE BOX
Patient is a 83y old  Male who presents with a chief complaint of shortness of breath x 4 days (08 Dec 2018 16:50)      SUBJECTIVE / OVERNIGHT EVENTS: No acute events overnight     MEDICATIONS  (STANDING):  aspirin enteric coated 81 milliGRAM(s) Oral daily  buDESOnide 160 MICROgram(s)/formoterol 4.5 MICROgram(s) Inhaler 2 Puff(s) Inhalation two times a day  clopidogrel Tablet 75 milliGRAM(s) Oral daily  dextrose 5%. 1000 milliLiter(s) (50 mL/Hr) IV Continuous <Continuous>  dextrose 50% Injectable 12.5 Gram(s) IV Push once  dextrose 50% Injectable 25 Gram(s) IV Push once  dextrose 50% Injectable 25 Gram(s) IV Push once  doxazosin 2 milliGRAM(s) Oral at bedtime  furosemide   Injectable 40 milliGRAM(s) IV Push every 12 hours  heparin  Injectable 5000 Unit(s) SubCutaneous every 12 hours  hydrALAZINE 75 milliGRAM(s) Oral <User Schedule>  insulin glargine Injectable (LANTUS) 5 Unit(s) SubCutaneous at bedtime  insulin lispro (HumaLOG) corrective regimen sliding scale   SubCutaneous three times a day before meals  insulin lispro (HumaLOG) corrective regimen sliding scale   SubCutaneous at bedtime  metoprolol tartrate 50 milliGRAM(s) Oral <User Schedule>  NIFEdipine XL 90 milliGRAM(s) Oral <User Schedule>  pantoprazole    Tablet 40 milliGRAM(s) Oral before breakfast  potassium chloride    Tablet ER 40 milliEquivalent(s) Oral once  senna 2 Tablet(s) Oral at bedtime  sertraline 100 milliGRAM(s) Oral daily  simvastatin 20 milliGRAM(s) Oral at bedtime    MEDICATIONS  (PRN):  acetaminophen   Tablet .. 650 milliGRAM(s) Oral every 6 hours PRN Moderate Pain (4 - 6)  dextrose 40% Gel 15 Gram(s) Oral once PRN Blood Glucose LESS THAN 70 milliGRAM(s)/deciliter  glucagon  Injectable 1 milliGRAM(s) IntraMuscular once PRN Glucose LESS THAN 70 milligrams/deciliter  oxyCODONE    Solution 5 milliGRAM(s) Oral every 3 hours PRN Moderate Pain (4 - 6)/dyspnea        CAPILLARY BLOOD GLUCOSE      POCT Blood Glucose.: 148 mg/dL (09 Dec 2018 12:39)  POCT Blood Glucose.: 179 mg/dL (09 Dec 2018 08:16)  POCT Blood Glucose.: 211 mg/dL (08 Dec 2018 22:20)  POCT Blood Glucose.: 157 mg/dL (08 Dec 2018 17:16)    I&O's Summary    08 Dec 2018 07:01  -  09 Dec 2018 07:00  --------------------------------------------------------  IN: 1450 mL / OUT: 1850 mL / NET: -400 mL    09 Dec 2018 07:01  -  09 Dec 2018 16:25  --------------------------------------------------------  IN: 0 mL / OUT: 750 mL / NET: -750 mL        PHYSICAL EXAM:  GENERAL: NAD, frail   HEAD:  Atraumatic, Normocephalic  EYES: conjunctiva and sclera clear  NECK:  No JVD  CHEST/LUNG: Clear to auscultation bilaterally; No wheeze  HEART: Regular rate and rhythm; S1S2  ABDOMEN: Soft, Nontender, Nondistended; Bowel sounds present  EXTREMITIES:  2+ Peripheral Pulses, No clubbing, cyanosis, or edema  PSYCH: AAOx1-2  SKIN: No rashes or lesions    LABS:                        7.3    3.69  )-----------( 127      ( 09 Dec 2018 12:16 )             23.8     12-09    142  |  101  |  79<H>  ----------------------------<  159<H>  3.4<L>   |  26  |  2.55<H>    Ca    9.5      09 Dec 2018 10:19  Mg     2.3     12-08                RADIOLOGY & ADDITIONAL TESTS:    Imaging Personally Reviewed:    Consultant(s) Notes Reviewed:      Care Discussed with Consultants/Other Providers:

## 2018-12-09 NOTE — PROGRESS NOTE ADULT - PROBLEM SELECTOR PLAN 1
Patient presenting with worsening dyspnea with pulmonary edema secondary to acute on chronic HFpEF in the setting of known severe MR as well as moderate to severe aortic regurgitation with severe pulm HTN.  Per Dr. Bazzi who spoke with CT surgery, patient is not a candidate for an intervention on the mitral valve.  Continue supplemental oxygen, IV Lasix BID.  DNR order placed and form signed per patient's wishes.  plan is for discharge home with hospice   pending O2 set up at home

## 2018-12-10 DIAGNOSIS — Z51.5 ENCOUNTER FOR PALLIATIVE CARE: ICD-10-CM

## 2018-12-10 DIAGNOSIS — R53.81 OTHER MALAISE: ICD-10-CM

## 2018-12-10 LAB
ANION GAP SERPL CALC-SCNC: 16 MMOL/L — SIGNIFICANT CHANGE UP (ref 5–17)
BUN SERPL-MCNC: 77 MG/DL — HIGH (ref 7–23)
CALCIUM SERPL-MCNC: 9.2 MG/DL — SIGNIFICANT CHANGE UP (ref 8.4–10.5)
CHLORIDE SERPL-SCNC: 103 MMOL/L — SIGNIFICANT CHANGE UP (ref 96–108)
CO2 SERPL-SCNC: 25 MMOL/L — SIGNIFICANT CHANGE UP (ref 22–31)
CREAT SERPL-MCNC: 2.36 MG/DL — HIGH (ref 0.5–1.3)
GLUCOSE BLDC GLUCOMTR-MCNC: 164 MG/DL — HIGH (ref 70–99)
GLUCOSE SERPL-MCNC: 138 MG/DL — HIGH (ref 70–99)
HCT VFR BLD CALC: 22.1 % — LOW (ref 39–50)
HGB BLD-MCNC: 7.4 G/DL — LOW (ref 13–17)
MAGNESIUM SERPL-MCNC: 2.3 MG/DL — SIGNIFICANT CHANGE UP (ref 1.6–2.6)
MCHC RBC-ENTMCNC: 29.1 PG — SIGNIFICANT CHANGE UP (ref 27–34)
MCHC RBC-ENTMCNC: 33.5 GM/DL — SIGNIFICANT CHANGE UP (ref 32–36)
MCV RBC AUTO: 86.8 FL — SIGNIFICANT CHANGE UP (ref 80–100)
PHOSPHATE SERPL-MCNC: 4.6 MG/DL — HIGH (ref 2.5–4.5)
PLATELET # BLD AUTO: 136 K/UL — LOW (ref 150–400)
POTASSIUM SERPL-MCNC: 3.4 MMOL/L — LOW (ref 3.5–5.3)
POTASSIUM SERPL-SCNC: 3.4 MMOL/L — LOW (ref 3.5–5.3)
RBC # BLD: 2.54 M/UL — LOW (ref 4.2–5.8)
RBC # FLD: 15.2 % — HIGH (ref 10.3–14.5)
SODIUM SERPL-SCNC: 144 MMOL/L — SIGNIFICANT CHANGE UP (ref 135–145)
WBC # BLD: 3.7 K/UL — LOW (ref 3.8–10.5)
WBC # FLD AUTO: 3.7 K/UL — LOW (ref 3.8–10.5)

## 2018-12-10 PROCEDURE — 99233 SBSQ HOSP IP/OBS HIGH 50: CPT

## 2018-12-10 RX ORDER — FUROSEMIDE 40 MG
1 TABLET ORAL
Qty: 0 | Refills: 0 | COMMUNITY
Start: 2018-12-10

## 2018-12-10 RX ORDER — PANTOPRAZOLE SODIUM 20 MG/1
1 TABLET, DELAYED RELEASE ORAL
Qty: 30 | Refills: 0 | OUTPATIENT
Start: 2018-12-10 | End: 2019-01-08

## 2018-12-10 RX ORDER — DOXAZOSIN MESYLATE 4 MG
1 TABLET ORAL
Qty: 0 | Refills: 0 | COMMUNITY

## 2018-12-10 RX ORDER — DOXAZOSIN MESYLATE 4 MG
1 TABLET ORAL
Qty: 30 | Refills: 0 | OUTPATIENT
Start: 2018-12-10 | End: 2019-01-08

## 2018-12-10 RX ORDER — ASPIRIN/CALCIUM CARB/MAGNESIUM 324 MG
1 TABLET ORAL
Qty: 0 | Refills: 0 | COMMUNITY

## 2018-12-10 RX ORDER — OXYCODONE HYDROCHLORIDE 5 MG/1
5 TABLET ORAL
Qty: 280 | Refills: 0 | OUTPATIENT
Start: 2018-12-10 | End: 2018-12-16

## 2018-12-10 RX ORDER — PANTOPRAZOLE SODIUM 20 MG/1
1 TABLET, DELAYED RELEASE ORAL
Qty: 0 | Refills: 0 | COMMUNITY

## 2018-12-10 RX ORDER — HYDRALAZINE HCL 50 MG
125 TABLET ORAL
Qty: 0 | Refills: 0 | COMMUNITY

## 2018-12-10 RX ORDER — SERTRALINE 25 MG/1
2 TABLET, FILM COATED ORAL
Qty: 60 | Refills: 0 | OUTPATIENT
Start: 2018-12-10 | End: 2019-01-08

## 2018-12-10 RX ORDER — ASPIRIN/CALCIUM CARB/MAGNESIUM 324 MG
1 TABLET ORAL
Qty: 30 | Refills: 0 | OUTPATIENT
Start: 2018-12-10 | End: 2019-01-08

## 2018-12-10 RX ORDER — NIFEDIPINE 30 MG
1 TABLET, EXTENDED RELEASE 24 HR ORAL
Qty: 30 | Refills: 0 | OUTPATIENT
Start: 2018-12-10 | End: 2019-01-08

## 2018-12-10 RX ORDER — OXYCODONE HYDROCHLORIDE 5 MG/1
5 TABLET ORAL
Qty: 0 | Refills: 0 | COMMUNITY
Start: 2018-12-10

## 2018-12-10 RX ORDER — METOPROLOL TARTRATE 50 MG
1 TABLET ORAL
Qty: 0 | Refills: 0 | COMMUNITY

## 2018-12-10 RX ORDER — METOPROLOL TARTRATE 50 MG
1 TABLET ORAL
Qty: 0 | Refills: 0 | COMMUNITY
Start: 2018-12-10

## 2018-12-10 RX ORDER — ACETAMINOPHEN 500 MG
2 TABLET ORAL
Qty: 0 | Refills: 0 | COMMUNITY
Start: 2018-12-10

## 2018-12-10 RX ORDER — HYDRALAZINE HCL 50 MG
3 TABLET ORAL
Qty: 0 | Refills: 0 | COMMUNITY
Start: 2018-12-10

## 2018-12-10 RX ORDER — SERTRALINE 25 MG/1
2 TABLET, FILM COATED ORAL
Qty: 0 | Refills: 0 | COMMUNITY

## 2018-12-10 RX ORDER — FUROSEMIDE 40 MG
80 TABLET ORAL DAILY
Qty: 0 | Refills: 0 | Status: DISCONTINUED | OUTPATIENT
Start: 2018-12-10 | End: 2018-12-11

## 2018-12-10 RX ORDER — CLOPIDOGREL BISULFATE 75 MG/1
1 TABLET, FILM COATED ORAL
Qty: 30 | Refills: 0 | OUTPATIENT
Start: 2018-12-10 | End: 2019-01-08

## 2018-12-10 RX ORDER — FUROSEMIDE 40 MG
40 TABLET ORAL DAILY
Qty: 0 | Refills: 0 | Status: DISCONTINUED | OUTPATIENT
Start: 2018-12-10 | End: 2018-12-10

## 2018-12-10 RX ORDER — ACETAMINOPHEN 500 MG
2 TABLET ORAL
Qty: 120 | Refills: 0 | OUTPATIENT
Start: 2018-12-10 | End: 2018-12-24

## 2018-12-10 RX ORDER — NIFEDIPINE 30 MG
2 TABLET, EXTENDED RELEASE 24 HR ORAL
Qty: 0 | Refills: 0 | COMMUNITY

## 2018-12-10 RX ORDER — METOPROLOL TARTRATE 50 MG
1 TABLET ORAL
Qty: 30 | Refills: 0 | OUTPATIENT
Start: 2018-12-10 | End: 2019-01-08

## 2018-12-10 RX ORDER — CLOPIDOGREL BISULFATE 75 MG/1
1 TABLET, FILM COATED ORAL
Qty: 0 | Refills: 0 | COMMUNITY

## 2018-12-10 RX ORDER — NIFEDIPINE 30 MG
1 TABLET, EXTENDED RELEASE 24 HR ORAL
Qty: 0 | Refills: 0 | COMMUNITY
Start: 2018-12-10

## 2018-12-10 RX ORDER — HYDRALAZINE HCL 50 MG
3 TABLET ORAL
Qty: 270 | Refills: 0 | OUTPATIENT
Start: 2018-12-10 | End: 2019-01-08

## 2018-12-10 RX ORDER — FUROSEMIDE 40 MG
1 TABLET ORAL
Qty: 30 | Refills: 0 | OUTPATIENT
Start: 2018-12-10 | End: 2019-01-08

## 2018-12-10 RX ADMIN — Medication 1: at 12:49

## 2018-12-10 RX ADMIN — HEPARIN SODIUM 5000 UNIT(S): 5000 INJECTION INTRAVENOUS; SUBCUTANEOUS at 17:51

## 2018-12-10 RX ADMIN — Medication 50 MILLIGRAM(S): at 22:32

## 2018-12-10 RX ADMIN — Medication 40 MILLIGRAM(S): at 05:14

## 2018-12-10 RX ADMIN — OXYCODONE HYDROCHLORIDE 5 MILLIGRAM(S): 5 TABLET ORAL at 03:36

## 2018-12-10 RX ADMIN — HEPARIN SODIUM 5000 UNIT(S): 5000 INJECTION INTRAVENOUS; SUBCUTANEOUS at 05:14

## 2018-12-10 RX ADMIN — PANTOPRAZOLE SODIUM 40 MILLIGRAM(S): 20 TABLET, DELAYED RELEASE ORAL at 05:14

## 2018-12-10 RX ADMIN — SERTRALINE 100 MILLIGRAM(S): 25 TABLET, FILM COATED ORAL at 12:48

## 2018-12-10 RX ADMIN — OXYCODONE HYDROCHLORIDE 5 MILLIGRAM(S): 5 TABLET ORAL at 12:42

## 2018-12-10 RX ADMIN — Medication 1: at 08:39

## 2018-12-10 RX ADMIN — Medication 50 MILLIGRAM(S): at 11:21

## 2018-12-10 RX ADMIN — INSULIN GLARGINE 5 UNIT(S): 100 INJECTION, SOLUTION SUBCUTANEOUS at 22:26

## 2018-12-10 RX ADMIN — OXYCODONE HYDROCHLORIDE 5 MILLIGRAM(S): 5 TABLET ORAL at 02:36

## 2018-12-10 RX ADMIN — BUDESONIDE AND FORMOTEROL FUMARATE DIHYDRATE 2 PUFF(S): 160; 4.5 AEROSOL RESPIRATORY (INHALATION) at 05:13

## 2018-12-10 RX ADMIN — OXYCODONE HYDROCHLORIDE 5 MILLIGRAM(S): 5 TABLET ORAL at 11:21

## 2018-12-10 RX ADMIN — SIMVASTATIN 20 MILLIGRAM(S): 20 TABLET, FILM COATED ORAL at 22:32

## 2018-12-10 RX ADMIN — Medication 75 MILLIGRAM(S): at 05:14

## 2018-12-10 RX ADMIN — Medication 1: at 17:51

## 2018-12-10 RX ADMIN — BUDESONIDE AND FORMOTEROL FUMARATE DIHYDRATE 2 PUFF(S): 160; 4.5 AEROSOL RESPIRATORY (INHALATION) at 17:51

## 2018-12-10 RX ADMIN — CLOPIDOGREL BISULFATE 75 MILLIGRAM(S): 75 TABLET, FILM COATED ORAL at 12:48

## 2018-12-10 RX ADMIN — Medication 2 MILLIGRAM(S): at 22:32

## 2018-12-10 RX ADMIN — Medication 75 MILLIGRAM(S): at 14:07

## 2018-12-10 RX ADMIN — SENNA PLUS 2 TABLET(S): 8.6 TABLET ORAL at 22:32

## 2018-12-10 RX ADMIN — Medication 75 MILLIGRAM(S): at 20:30

## 2018-12-10 RX ADMIN — Medication 81 MILLIGRAM(S): at 12:48

## 2018-12-10 RX ADMIN — Medication 90 MILLIGRAM(S): at 05:14

## 2018-12-10 NOTE — PROGRESS NOTE ADULT - SUBJECTIVE AND OBJECTIVE BOX
Patient is a 83y old  Male who presents with a chief complaint of shortness of breath x 4 days (08 Dec 2018 16:50)      SUBJECTIVE / OVERNIGHT EVENTS: Pt examined on 12/10/2018. Denies any SOB, N/V, abdominal cramping.  No other active complaints     MEDICATIONS  (STANDING):  aspirin enteric coated 81 milliGRAM(s) Oral daily  buDESOnide 160 MICROgram(s)/formoterol 4.5 MICROgram(s) Inhaler 2 Puff(s) Inhalation two times a day  clopidogrel Tablet 75 milliGRAM(s) Oral daily  dextrose 5%. 1000 milliLiter(s) (50 mL/Hr) IV Continuous <Continuous>  dextrose 50% Injectable 12.5 Gram(s) IV Push once  dextrose 50% Injectable 25 Gram(s) IV Push once  dextrose 50% Injectable 25 Gram(s) IV Push once  doxazosin 2 milliGRAM(s) Oral at bedtime  furosemide   Injectable 40 milliGRAM(s) IV Push every 12 hours  heparin  Injectable 5000 Unit(s) SubCutaneous every 12 hours  hydrALAZINE 75 milliGRAM(s) Oral <User Schedule>  insulin glargine Injectable (LANTUS) 5 Unit(s) SubCutaneous at bedtime  insulin lispro (HumaLOG) corrective regimen sliding scale   SubCutaneous three times a day before meals  insulin lispro (HumaLOG) corrective regimen sliding scale   SubCutaneous at bedtime  metoprolol tartrate 50 milliGRAM(s) Oral <User Schedule>  NIFEdipine XL 90 milliGRAM(s) Oral <User Schedule>  pantoprazole    Tablet 40 milliGRAM(s) Oral before breakfast  potassium chloride    Tablet ER 40 milliEquivalent(s) Oral once  senna 2 Tablet(s) Oral at bedtime  sertraline 100 milliGRAM(s) Oral daily  simvastatin 20 milliGRAM(s) Oral at bedtime    MEDICATIONS  (PRN):  acetaminophen   Tablet .. 650 milliGRAM(s) Oral every 6 hours PRN Moderate Pain (4 - 6)  dextrose 40% Gel 15 Gram(s) Oral once PRN Blood Glucose LESS THAN 70 milliGRAM(s)/deciliter  glucagon  Injectable 1 milliGRAM(s) IntraMuscular once PRN Glucose LESS THAN 70 milligrams/deciliter  oxyCODONE    Solution 5 milliGRAM(s) Oral every 3 hours PRN Moderate Pain (4 - 6)/dyspnea        CAPILLARY BLOOD GLUCOSE      POCT Blood Glucose.: 148 mg/dL (09 Dec 2018 12:39)  POCT Blood Glucose.: 179 mg/dL (09 Dec 2018 08:16)  POCT Blood Glucose.: 211 mg/dL (08 Dec 2018 22:20)  POCT Blood Glucose.: 157 mg/dL (08 Dec 2018 17:16)    I&O's Summary    08 Dec 2018 07:01  -  09 Dec 2018 07:00  --------------------------------------------------------  IN: 1450 mL / OUT: 1850 mL / NET: -400 mL    09 Dec 2018 07:01  -  09 Dec 2018 16:25  --------------------------------------------------------  IN: 0 mL / OUT: 750 mL / NET: -750 mL        PHYSICAL EXAM:  GENERAL: NAD, frail   HEAD:  Atraumatic, Normocephalic  EYES: conjunctiva and sclera clear  NECK:  No JVD  CHEST/LUNG: Clear to auscultation bilaterally; No wheeze  HEART: Regular rate and rhythm; S1S2  ABDOMEN: Soft, Nontender, Nondistended; Bowel sounds present  EXTREMITIES:  2+ Peripheral Pulses, No clubbing, cyanosis, or edema  PSYCH: AAOx1-2  SKIN: No rashes or lesions    LABS:                                   7.4    3.7   )-----------( 136      ( 10 Dec 2018 10:42 )             22.1     12-09    12-10    144  |  103  |  77<H>  ----------------------------<  138<H>  3.4<L>   |  25  |  2.36<H>    Ca    9.2      10 Dec 2018 10:39  Phos  4.6     12-10  Mg     2.3     12-10        Ca    9.5      09 Dec 2018 10:19  Mg     2.3     12-08                RADIOLOGY & ADDITIONAL TESTS:    Imaging Personally Reviewed:    Consultant(s) Notes Reviewed:      Care Discussed with Consultants/Other Providers:

## 2018-12-10 NOTE — CONSULT NOTE ADULT - SUBJECTIVE AND OBJECTIVE BOX
Patient is a 83y old  Male who presents with a chief complaint of shortness of breath x 4 days (10 Dec 2018 12:00)    HPI:  83M with PMH HTN, HLD, T2DM, CAD s/p 6 stents (last stent 6 years ago), HFpEF (EF 75%, and CKD stage 4, presents with shortness of breath for 4 days. He endorses dyspnea on exertion with minimal ambulation, orthopnea, and worsening lower extremity edema. He has chronic right sided chest pain which radiated to the arm. He denies fevers, chills, productive cough, diarrhea or dysuria. No sick contacts. Patient was recently discharged from [facility] for similar symptoms on 11/20/2018 during which inpatient workup of heart failure exacerbation revealed: cardiac catheterization without hemodynamically significant stenosis; MARBIN showed severe mitral regurg with torn chordae, moderate-severe aortic regurgitation and severe pulmonary hypertension. He was medically optimized and evaluated by CT surgery for valve repair which was deferred as patient clinically improved.    In ED, T 98.2, HR 82, BP: 129/52, and 91 % on RA. Labs notable for 9.4 (baseline 8-9s), Cr 2.16 (improved 2.32 on 11/2018), proBNP 3000, and lactate 1.3. Troponin 36. RVP neg. CXR with hilar congestion. He was given lasix 80 x 1 (02 Dec 2018 19:12)    PAST MEDICAL & SURGICAL HISTORY:  Heart failure with preserved ejection fraction  HTN (hypertension)  T2DM (type 2 diabetes mellitus)  HLD (hyperlipidemia)  CAD S/P percutaneous coronary angioplasty    ( -  ) heart valve replacement  ( -  ) joint replacement    MEDICATIONS  (STANDING):  aspirin enteric coated 81 milliGRAM(s) Oral daily  buDESOnide 160 MICROgram(s)/formoterol 4.5 MICROgram(s) Inhaler 2 Puff(s) Inhalation two times a day  clopidogrel Tablet 75 milliGRAM(s) Oral daily  dextrose 5%. 1000 milliLiter(s) (50 mL/Hr) IV Continuous <Continuous>  dextrose 50% Injectable 12.5 Gram(s) IV Push once  dextrose 50% Injectable 25 Gram(s) IV Push once  dextrose 50% Injectable 25 Gram(s) IV Push once  doxazosin 2 milliGRAM(s) Oral at bedtime  furosemide    Tablet 80 milliGRAM(s) Oral daily  heparin  Injectable 5000 Unit(s) SubCutaneous every 12 hours  hydrALAZINE 75 milliGRAM(s) Oral <User Schedule>  insulin glargine Injectable (LANTUS) 5 Unit(s) SubCutaneous at bedtime  insulin lispro (HumaLOG) corrective regimen sliding scale   SubCutaneous three times a day before meals  insulin lispro (HumaLOG) corrective regimen sliding scale   SubCutaneous at bedtime  metoprolol tartrate 50 milliGRAM(s) Oral <User Schedule>  NIFEdipine XL 90 milliGRAM(s) Oral <User Schedule>  pantoprazole    Tablet 40 milliGRAM(s) Oral before breakfast  senna 2 Tablet(s) Oral at bedtime  sertraline 100 milliGRAM(s) Oral daily  simvastatin 20 milliGRAM(s) Oral at bedtime    MEDICATIONS  (PRN):  acetaminophen   Tablet .. 650 milliGRAM(s) Oral every 6 hours PRN Moderate Pain (4 - 6)  dextrose 40% Gel 15 Gram(s) Oral once PRN Blood Glucose LESS THAN 70 milliGRAM(s)/deciliter  glucagon  Injectable 1 milliGRAM(s) IntraMuscular once PRN Glucose LESS THAN 70 milligrams/deciliter  oxyCODONE    Solution 5 milliGRAM(s) Oral every 3 hours PRN Moderate Pain (4 - 6)/dyspnea      Allergies  No Known Allergies    Intolerances    FAMILY HISTORY:  No pertinent family history in first degree relatives: No hx of CAD in his children    Vital Signs Last 24 Hrs  T(C): 36.7 (10 Dec 2018 17:12), Max: 36.8 (10 Dec 2018 05:09)  T(F): 98.1 (10 Dec 2018 17:12), Max: 98.3 (10 Dec 2018 05:09)  HR: 67 (10 Dec 2018 17:12) (64 - 78)  BP: 124/56 (10 Dec 2018 17:12) (115/62 - 141/59)  BP(mean): --  RR: 18 (10 Dec 2018 17:12) (17 - 18)  SpO2: 95% (10 Dec 2018 17:12) (94% - 100%)    EOE:  TMJ ( -  ) clicks                    (  -  ) pops                    (  -  ) crepitus             Mandible FROM             Facial bones and MOM grossly intact             ( -  ) trismus             ( -  ) LAD             ( -  ) swelling             ( -  ) asymmetry             ( -  ) palpation             ( -  ) SOB             ( -  ) dysphagia             ( -  ) LOC    IOE:  permanent dentition: grossly intact with multiple carious teeth and multiple missing teeth           hard/soft palate:  ( -  ) palatal torus           tongue/FOM WNL           labial/buccal mucosa WNL           ( +  ) percussion - tenderness to perc #3           ( +  ) palpation - tenderness to palpation buccal and lingual to #3 and to palpation of the buccal vestibule proximal to #3           ( -  ) swelling - no intraoral swelling noted    Dentition present: #3, 11, 21, 22-27  Mobility: none  Caries: #3 buccal and mesial gross decay    LABS:                        7.4    3.7   )-----------( 136      ( 10 Dec 2018 10:42 )             22.1     12-10    144  |  103  |  77<H>  ----------------------------<  138<H>  3.4<L>   |  25  |  2.36<H>    Ca    9.2      10 Dec 2018 10:39  Phos  4.6     12-10  Mg     2.3     12-10      WBC Count: 3.7 K/uL <L> [3.8 - 10.5] (12-10 @ 10:42)    Platelet Count - Automated: 136 K/uL <L> [150 - 400] (12-10 @ 10:42)  Platelet Count - Automated: 127 K/uL <L> [150 - 400] (12-09 @ 12:16)    RADIOLOGY & ADDITIONAL STUDIES:  None taken at this time    ASSESSMENT:  #3 irreversible pulpitis, symptomatic apical periodontitis    PROCEDURE:  Verbal consent given. EOE, IOE completed bedside with immediate family. No signs of acute infection at this time, recommendation of pain management and palliative care until patient can see an outpatient dentist. Family requested oral antibiotics be prescribed due to patient being transferred to hospice care following discharge and difficulty of accessing dental services once in hospice care. Informed family that antibiotics are not indicated at this time and are not recommended, and does not definitely address the source of infection.    Recommendation of pain management by oral medication as per medical team, marcaine block, or pulpotomy, to be discussed with patient's family. If family wishes to proceed with marcaine block, patient must be cleared for use of local anesthetic with epinephrine. If family wishes to proceed with pulpotomy, patient must be medically cleared for transport by wheelchair to the dental room and must be cleared for dental procedures with local anesthetic using epinephrine given current cardiac condition. Please note if patient is medically optimized and if antibiotic prophylaxis is recommended prior to procedure. Family must be present with patient during procedure and to obtain consent. Case reviewed with chief resident Dr. Beckie Wilkinson.    RECOMMENDATIONS:   1) Discuss pain control options with patient and family. Obtain necessary clearances for transport and pulpotomy and obtain written consent as needed.  2) Dental F/U with outpatient dentist for comprehensive dental care.   3) If any difficulty swallowing/breathing, fever occur, page dental.     Jocelyn Mijares DDS, pager #[phone] Patient is a 83y old  Male who presents with a chief complaint of shortness of breath x 4 days (10 Dec 2018 12:00)    HPI:  83M with PMH HTN, HLD, T2DM, CAD s/p 6 stents (last stent 6 years ago), HFpEF (EF 75%, and CKD stage 4, presents with shortness of breath for 4 days. He endorses dyspnea on exertion with minimal ambulation, orthopnea, and worsening lower extremity edema. He has chronic right sided chest pain which radiated to the arm. He denies fevers, chills, productive cough, diarrhea or dysuria. No sick contacts. Patient was recently discharged from [facility] for similar symptoms on 11/20/2018 during which inpatient workup of heart failure exacerbation revealed: cardiac catheterization without hemodynamically significant stenosis; MARBIN showed severe mitral regurg with torn chordae, moderate-severe aortic regurgitation and severe pulmonary hypertension. He was medically optimized and evaluated by CT surgery for valve repair which was deferred as patient clinically improved.    In ED, T 98.2, HR 82, BP: 129/52, and 91 % on RA. Labs notable for 9.4 (baseline 8-9s), Cr 2.16 (improved 2.32 on 11/2018), proBNP 3000, and lactate 1.3. Troponin 36. RVP neg. CXR with hilar congestion. He was given lasix 80 x 1 (02 Dec 2018 19:12)    PAST MEDICAL & SURGICAL HISTORY:  Heart failure with preserved ejection fraction  HTN (hypertension)  T2DM (type 2 diabetes mellitus)  HLD (hyperlipidemia)  CAD S/P percutaneous coronary angioplasty    ( -  ) heart valve replacement  ( -  ) joint replacement    MEDICATIONS  (STANDING):  aspirin enteric coated 81 milliGRAM(s) Oral daily  buDESOnide 160 MICROgram(s)/formoterol 4.5 MICROgram(s) Inhaler 2 Puff(s) Inhalation two times a day  clopidogrel Tablet 75 milliGRAM(s) Oral daily  dextrose 5%. 1000 milliLiter(s) (50 mL/Hr) IV Continuous <Continuous>  dextrose 50% Injectable 12.5 Gram(s) IV Push once  dextrose 50% Injectable 25 Gram(s) IV Push once  dextrose 50% Injectable 25 Gram(s) IV Push once  doxazosin 2 milliGRAM(s) Oral at bedtime  furosemide    Tablet 80 milliGRAM(s) Oral daily  heparin  Injectable 5000 Unit(s) SubCutaneous every 12 hours  hydrALAZINE 75 milliGRAM(s) Oral <User Schedule>  insulin glargine Injectable (LANTUS) 5 Unit(s) SubCutaneous at bedtime  insulin lispro (HumaLOG) corrective regimen sliding scale   SubCutaneous three times a day before meals  insulin lispro (HumaLOG) corrective regimen sliding scale   SubCutaneous at bedtime  metoprolol tartrate 50 milliGRAM(s) Oral <User Schedule>  NIFEdipine XL 90 milliGRAM(s) Oral <User Schedule>  pantoprazole    Tablet 40 milliGRAM(s) Oral before breakfast  senna 2 Tablet(s) Oral at bedtime  sertraline 100 milliGRAM(s) Oral daily  simvastatin 20 milliGRAM(s) Oral at bedtime    MEDICATIONS  (PRN):  acetaminophen   Tablet .. 650 milliGRAM(s) Oral every 6 hours PRN Moderate Pain (4 - 6)  dextrose 40% Gel 15 Gram(s) Oral once PRN Blood Glucose LESS THAN 70 milliGRAM(s)/deciliter  glucagon  Injectable 1 milliGRAM(s) IntraMuscular once PRN Glucose LESS THAN 70 milligrams/deciliter  oxyCODONE    Solution 5 milliGRAM(s) Oral every 3 hours PRN Moderate Pain (4 - 6)/dyspnea      Allergies  No Known Allergies    Intolerances    FAMILY HISTORY:  No pertinent family history in first degree relatives: No hx of CAD in his children    Vital Signs Last 24 Hrs  T(C): 36.7 (10 Dec 2018 17:12), Max: 36.8 (10 Dec 2018 05:09)  T(F): 98.1 (10 Dec 2018 17:12), Max: 98.3 (10 Dec 2018 05:09)  HR: 67 (10 Dec 2018 17:12) (64 - 78)  BP: 124/56 (10 Dec 2018 17:12) (115/62 - 141/59)  BP(mean): --  RR: 18 (10 Dec 2018 17:12) (17 - 18)  SpO2: 95% (10 Dec 2018 17:12) (94% - 100%)    EOE:  TMJ ( -  ) clicks                    (  -  ) pops                    (  -  ) crepitus             Mandible FROM             Facial bones and MOM grossly intact             ( -  ) trismus             ( -  ) LAD             ( -  ) swelling             ( -  ) asymmetry             ( -  ) palpation             ( -  ) SOB             ( -  ) dysphagia             ( -  ) LOC    IOE:  permanent dentition: grossly intact with multiple carious teeth and multiple missing teeth           hard/soft palate:  ( -  ) palatal torus           tongue/FOM WNL           labial/buccal mucosa WNL           ( +  ) percussion - tenderness to perc #3           ( +  ) palpation - tenderness to palpation buccal and lingual to #3 and to palpation of the buccal vestibule proximal to #3           ( -  ) swelling - no intraoral swelling noted    Dentition present: #3, 11, 21, 22-27  Mobility: none  Caries: #3 buccal and mesial gross decay    LABS:                        7.4    3.7   )-----------( 136      ( 10 Dec 2018 10:42 )             22.1     12-10    144  |  103  |  77<H>  ----------------------------<  138<H>  3.4<L>   |  25  |  2.36<H>    Ca    9.2      10 Dec 2018 10:39  Phos  4.6     12-10  Mg     2.3     12-10      WBC Count: 3.7 K/uL <L> [3.8 - 10.5] (12-10 @ 10:42)    Platelet Count - Automated: 136 K/uL <L> [150 - 400] (12-10 @ 10:42)  Platelet Count - Automated: 127 K/uL <L> [150 - 400] (12-09 @ 12:16)    RADIOLOGY & ADDITIONAL STUDIES:  None taken at this time    ASSESSMENT:  #3 irreversible pulpitis, symptomatic apical periodontitis    PROCEDURE:  Verbal consent given. EOE, IOE completed bedside with immediate family. No signs of acute infection at this time, recommendation of pain management and palliative care until patient can see an outpatient dentist. Family requested oral antibiotics be prescribed due to patient being transferred to hospice care following discharge and difficulty of accessing dental services once in hospice care. Informed family that antibiotics are not indicated at this time and are not recommended, and does not definitely address the source of infection.    Recommendation of pain management by oral medication as per medical team, marcaine block, or pulpotomy, to be discussed with patient's family. If family wishes to proceed with marcaine block, patient must be cleared for use of local anesthetic with epinephrine. If family wishes to proceed with pulpotomy, patient must be medically cleared for transport by wheelchair to the dental room and must be cleared for dental procedures with local anesthetic using epinephrine given current cardiac condition. Please note if patient is medically optimized and if antibiotic prophylaxis is recommended prior to procedure. Family must be present with patient during procedure and to obtain consent. Case reviewed with chief resident Dr. Beckie Wilkinson, to be also reviewed with dental attendings.    RECOMMENDATIONS:   1) Discuss pain control options with patient and family. Obtain necessary clearances for transport and pulpotomy and obtain written consent as needed.  2) Dental F/U with outpatient dentist for comprehensive dental care.   3) If any difficulty swallowing/breathing, fever occur, page dental.     Jocelyn Mijares DDS, pager #[phone]

## 2018-12-10 NOTE — GOALS OF CARE CONVERSATION - PERSONAL ADVANCE DIRECTIVE - CONVERSATION DETAILS
Pt seen, sleeping in chair, no distress noted. Collaborated with  Carmen Noland Pepchantallisha, daughter Mary and NP Preethi Reynolds about plan of care. Confirmed with daughter DME and oxygen delivered to home, daughter requested diapers to be ordered, completed. As per NP Preethi Reynolds, daughter requested dental consult, pending at this time. Patient to be discharged home on home hospice pending dental consult, anticipated for tomorrow. Reviewed discharge medications and [facility] pharmacy information.  Provided [facility] contact information, please call with any further concerns. Telephone call to daughter, confirmed the above information and she verbalized agreement. Thank you.

## 2018-12-10 NOTE — PROGRESS NOTE ADULT - SUBJECTIVE AND OBJECTIVE BOX
Pt complains of dyspnea this weekend  Infrequent prn use        T(C): 36.8 (12-10-18 @ 05:09), Max: 36.8 (12-10-18 @ 05:09)  HR: 67 (12-10-18 @ 05:09) (67 - 78)  BP: 132/56 (12-10-18 @ 05:09) (115/67 - 141/59)  RR: 18 (12-10-18 @ 05:09) (17 - 18)  SpO2: 100% (12-10-18 @ 05:09) (94% - 100%)  Wt(kg): --      09 Dec 2018 07:01  -  10 Dec 2018 07:00  --------------------------------------------------------  IN:    Oral Fluid: 440 mL  Total IN: 440 mL    OUT:    Voided: 1380 mL  Total OUT: 1380 mL    Total NET: -940 mL      10 Dec 2018 07:01  -  10 Dec 2018 11:56  --------------------------------------------------------  IN:  Total IN: 0 mL    OUT:    Voided: 550 mL  Total OUT: 550 mL    Total NET: -550 mL          12-09 @ 07:01  -  12-10 @ 07:00  --------------------------------------------------------  IN: 440 mL / OUT: 1380 mL / NET: -940 mL    12-10 @ 07:01  -  12-10 @ 11:56  --------------------------------------------------------  IN: 0 mL / OUT: 550 mL / NET: -550 mL      CAPILLARY BLOOD GLUCOSE      POCT Blood Glucose.: 164 mg/dL (10 Dec 2018 08:36)  POCT Blood Glucose.: 214 mg/dL (09 Dec 2018 21:39)  POCT Blood Glucose.: 209 mg/dL (09 Dec 2018 17:15)  POCT Blood Glucose.: 148 mg/dL (09 Dec 2018 12:39)        acetaminophen   Tablet .. 650 milliGRAM(s) Oral every 6 hours PRN  aspirin enteric coated 81 milliGRAM(s) Oral daily  buDESOnide 160 MICROgram(s)/formoterol 4.5 MICROgram(s) Inhaler 2 Puff(s) Inhalation two times a day  clopidogrel Tablet 75 milliGRAM(s) Oral daily  dextrose 40% Gel 15 Gram(s) Oral once PRN  dextrose 5%. 1000 milliLiter(s) IV Continuous <Continuous>  dextrose 50% Injectable 12.5 Gram(s) IV Push once  dextrose 50% Injectable 25 Gram(s) IV Push once  dextrose 50% Injectable 25 Gram(s) IV Push once  doxazosin 2 milliGRAM(s) Oral at bedtime  furosemide    Tablet 40 milliGRAM(s) Oral daily  glucagon  Injectable 1 milliGRAM(s) IntraMuscular once PRN  heparin  Injectable 5000 Unit(s) SubCutaneous every 12 hours  hydrALAZINE 75 milliGRAM(s) Oral <User Schedule>  insulin glargine Injectable (LANTUS) 5 Unit(s) SubCutaneous at bedtime  insulin lispro (HumaLOG) corrective regimen sliding scale   SubCutaneous three times a day before meals  insulin lispro (HumaLOG) corrective regimen sliding scale   SubCutaneous at bedtime  metoprolol tartrate 50 milliGRAM(s) Oral <User Schedule>  NIFEdipine XL 90 milliGRAM(s) Oral <User Schedule>  oxyCODONE    Solution 5 milliGRAM(s) Oral every 3 hours PRN  pantoprazole    Tablet 40 milliGRAM(s) Oral before breakfast  senna 2 Tablet(s) Oral at bedtime  sertraline 100 milliGRAM(s) Oral daily  simvastatin 20 milliGRAM(s) Oral at bedtime          12-10    144  |  103  |  77<H>  ----------------------------<  138<H>  3.4<L>   |  25  |  2.36<H>    Ca    9.2      10 Dec 2018 10:39  Phos  4.6     12-10  Mg     2.3     12-10        Procalc  BNP  ABG                          7.4    3.7   )-----------( 136      ( 10 Dec 2018 10:42 )             22.1           blood and urine cultures          PERTINENT PM/SXH:   Heart failure with preserved ejection fraction  HTN (hypertension)  CHF (congestive heart failure)  T2DM (type 2 diabetes mellitus)  HLD (hyperlipidemia)    CAD S/P percutaneous coronary angioplasty  No significant past surgical history    FAMILY HISTORY:  No pertinent family history in first degree relatives: No hx of CAD in his children    ITEMS NOT CHECKED ARE NOT PRESENT    SOCIAL HISTORY:   Significant other/partner:  [x ]  Children: Mary Verdin, Daughter  [ ]  Sabianism/Spirituality:  Substance hx:  [x ]   Tobacco hx: Former smoker, quit in 2000 Alcohol hx: [x ] less than 3 drinks per week   Home Opioid hx:  [ ] I-Stop Reference No:  Living Situation: [x ]Home. Lives with Mary villalta  [ ]Long term care  [ ]Rehab [ ]Other    ADVANCE DIRECTIVES:    DNR  Yes DNI Yes  MOLST  [ ]  Living Will  [ ]   DECISION MAKER(s):  [ ] Health Care Proxy(s)  [ ] Surrogate(s)  [ ] Guardian           Name(s): Phone Number(s):    BASELINE (I)ADL(s) (prior to admission):  [place]: [ ]Total  [x ] Moderate [ ]Dependent    Allergies    No Known Allergies    Intolerances    MEDICATIONS  (STANDING):  aspirin enteric coated 81 milliGRAM(s) Oral daily  buDESOnide 160 MICROgram(s)/formoterol 4.5 MICROgram(s) Inhaler 2 Puff(s) Inhalation two times a day  clopidogrel Tablet 75 milliGRAM(s) Oral daily  dextrose 5%. 1000 milliLiter(s) (50 mL/Hr) IV Continuous <Continuous>  dextrose 50% Injectable 12.5 Gram(s) IV Push once  dextrose 50% Injectable 25 Gram(s) IV Push once  dextrose 50% Injectable 25 Gram(s) IV Push once  doxazosin 2 milliGRAM(s) Oral at bedtime  furosemide   Injectable 40 milliGRAM(s) IV Push every 12 hours  heparin  Injectable 5000 Unit(s) SubCutaneous every 12 hours  hydrALAZINE 75 milliGRAM(s) Oral <User Schedule>  insulin glargine Injectable (LANTUS) 5 Unit(s) SubCutaneous at bedtime  insulin lispro (HumaLOG) corrective regimen sliding scale   SubCutaneous three times a day before meals  insulin lispro (HumaLOG) corrective regimen sliding scale   SubCutaneous at bedtime  metoprolol tartrate 50 milliGRAM(s) Oral <User Schedule>  NIFEdipine XL 90 milliGRAM(s) Oral <User Schedule>  pantoprazole    Tablet 40 milliGRAM(s) Oral before breakfast  potassium chloride    Tablet ER 20 milliEquivalent(s) Oral every 2 hours  senna 2 Tablet(s) Oral at bedtime  sertraline 100 milliGRAM(s) Oral daily  simvastatin 20 milliGRAM(s) Oral at bedtime    MEDICATIONS  (PRN):  acetaminophen   Tablet .. 650 milliGRAM(s) Oral every 6 hours PRN Moderate Pain (4 - 6)  dextrose 40% Gel 15 Gram(s) Oral once PRN Blood Glucose LESS THAN 70 milliGRAM(s)/deciliter  glucagon  Injectable 1 milliGRAM(s) IntraMuscular once PRN Glucose LESS THAN 70 milligrams/deciliter    PRESENT SYMPTOMS: [ ]Unable to obtain due to poor mentation   Source if other than patient:  [ ]Family   [ ]Team     Pain (Impact on QOL):    Location -         Minimal acceptable level (0-10 scale):                    Aggrevating factors -  Quality -  Radiation -  Severity (0-10 scale) -    Timing -    PAIN AD Score:     [email or web address] (press ctrl +  left click to view)    Dyspnea:                           [ ]Mild [ x]Moderate [ x]Severe  Anxiety:                             [ ]Mild [ ]Moderate [ ]Severe  Fatigue:                             [ ]Mild [ x]Moderate [ ]Severe  Nausea:                             [ ]Mild [ ]Moderate [ ]Severe  Loss of appetite:              [ ]Mild [ ]Moderate [ ]Severe  Constipation:                    [ ]Mild [ ]Moderate [ ]Severe    Other Symptoms:  [x ]All other review of systems negative     Karnofsky Performance Score/Palliative Performance Status Version 2:  40      %  PHYSICAL EXAM:  Vital Signs Last 24 Hrs  T(C): 36.5 (07 Dec 2018 09:17), Max: 36.6 (07 Dec 2018 05:13)  T(F): 97.7 (07 Dec 2018 09:17), Max: 97.9 (07 Dec 2018 05:13)  HR: 70 (07 Dec 2018 09:17) (70 - 85)  BP: 139/65 (07 Dec 2018 09:17) (122/60 - 152/61)  BP(mean): --  RR: 18 (07 Dec 2018 09:17) (18 - 20)  SpO2: 97% (07 Dec 2018 09:17) (95% - 97%) I&O's Summary    06 Dec 2018 07:01  -  07 Dec 2018 07:00  --------------------------------------------------------  IN: 420 mL / OUT: 700 mL / NET: -280 mL    07 Dec 2018 07:01  -  07 Dec 2018 12:45  --------------------------------------------------------  IN: 120 mL / OUT: 400 mL / NET: -280 mL    GENERAL:  [x ]Alert  [x ]Oriented x 3   [ ]Lethargic  [ ]Cachexia  [ ]Unarousable  [ ]Verbal  [ ]Non-Verbal  Behavioral:   [ ] Anxiety  [ ] Delirium [ ] Agitation [ ] Other  HEENT:  [x ]Normal   [ ]Dry mouth   [ ]ET Tube/Trach  [ ]Oral lesions  PULMONARY:   Increase work of Breathing. Decreased breath sounds of lower base. No wheezing, crackles. On 4L NC   CARDIOVASCULAR:    [x ]Regular [ ]Irregular [ ]Tachy  [ ]Dale [x ]Murmur; 3/6 Systolic Murmur [ ]Other  GASTROINTESTINAL:  [x ]Soft  [ ]Distended   [x ]+BS  [x ]Non tender [ ]Tender  [ ]PEG [ ]OGT/ NGT  Last BM:   GENITOURINARY:  [ ]Normal [ ] Incontinent   [ ]Oliguria/Anuria   [ ]Roberts  MUSCULOSKELETAL:   [ ]Normal   [x ]Weakness  [ ]Bed/Wheelchair bound [ ]Edema  NEUROLOGIC:   [x ]No focal deficits  [x ] Cognitive impairment  [ ] Dysphagia [ ]Dysarthria [ ] Paresis [ ]Other   SKIN:   [ x]Normal   [ ]Pressure ulcer(s)  [ ]Rash    CRITICAL CARE:  [ ] Shock Present  [ ]Septic [ ]Cardiogenic [ ]Neurologic [ ]Hypovolemic  [ ]  Vasopressors [ ]  Inotropes   [ ] Respiratory failure present  [ ] Acute  [ ] Chronic [ ] Hypoxic  [ ] Hypercarbic [ ] Other  [ ] Other organ failure     LABS:                        7.8    3.57  )-----------( 124      ( 07 Dec 2018 09:20 )             25.3   12-07    144  |  104  |  69<H>  ----------------------------<  150<H>  3.4<L>   |  24  |  2.35<H>    Ca    9.2      07 Dec 2018 07:26  Mg     2.3     12-07          RADIOLOGY & ADDITIONAL STUDIES:    PROTEIN CALORIE MALNUTRITION:   [ ] PPSV2 < or = to 30% [ ] significant weight loss  [ ] poor nutritional intake [ ] catabolic state [ ] anasarca     Albumin, Serum: 3.4 g/dL (12-03-18 @ 07:04)  Artificial Nutrition [ ]     REFERRALS:   [ ]Chaplaincy  [ ] Hospice  [ ]Child Life  [ ]Social Work  [ ]Case management [ ]Holistic Therapy   Goals of Care Discussion Document:

## 2018-12-10 NOTE — PROGRESS NOTE ADULT - PROBLEM SELECTOR PLAN 2
Hemoglobin A1C 4.7 in November.  Appeared during prior admission to have steroid induced hyperglycemia while being treat for gout flare, Prednisone taper completed on 12/4..  When patient first presented in early November, was taking Lantus 8units qhs while not on steroids.  On Lantus 5 units qhs and low dose sliding scale coverage  f/u with in home hospice for management  Continue to monitor blood sugars at home Hemoglobin A1C 4.7 in November.  Appeared during prior admission to have steroid induced hyperglycemia while being treat for gout flare, Prednisone taper completed on 12/4..  When patient first presented in early November, was taking Lantus 8units qhs while not on steroids.  On Lantus 5 units qhs and low dose sliding scale coverage  dc regimen on discharge

## 2018-12-10 NOTE — PROGRESS NOTE ADULT - PROBLEM SELECTOR PLAN 1
Patient presenting with worsening dyspnea with pulmonary edema secondary to acute on chronic HFpEF in the setting of known severe MR as well as moderate to severe aortic regurgitation with severe pulm HTN.  Per Dr. Bazzi who spoke with CT surgery, patient is not a candidate for an intervention on the mitral valve.  Continue supplemental oxygen, IV Lasix BID.  DNR order placed and form signed per patient's wishes.  plan is for discharge home with home hospice today  pending O2 set up at home Patient presenting with worsening dyspnea with pulmonary edema secondary to acute on chronic HFpEF in the setting of known severe MR as well as moderate to severe aortic regurgitation with severe pulm HTN.  Per Dr. Bazzi who spoke with CT surgery, patient is not a candidate for an intervention on the mitral valve.  Continue supplemental oxygen  lasix 80mg qd  DNR order placed and form signed per patient's wishes.  plan is for discharge home with home hospice  pending O2 set up at home

## 2018-12-10 NOTE — PROGRESS NOTE ADULT - PROBLEM SELECTOR PLAN 1
Dyspnea  Reports discomfort over the past weekend with SOB  May be related to aspiration as he reports some post prandial issues  Some SOB now, 6/10  Will give prn oxy soln now and reassess later this afternoon

## 2018-12-11 ENCOUNTER — INBOUND DOCUMENT (OUTPATIENT)
Age: 83
End: 2018-12-11

## 2018-12-11 VITALS
DIASTOLIC BLOOD PRESSURE: 617 MMHG | HEART RATE: 75 BPM | RESPIRATION RATE: 18 BRPM | TEMPERATURE: 98 F | OXYGEN SATURATION: 94 % | SYSTOLIC BLOOD PRESSURE: 127 MMHG

## 2018-12-11 DIAGNOSIS — K08.89 OTHER SPECIFIED DISORDERS OF TEETH AND SUPPORTING STRUCTURES: ICD-10-CM

## 2018-12-11 PROCEDURE — 82803 BLOOD GASES ANY COMBINATION: CPT

## 2018-12-11 PROCEDURE — 84132 ASSAY OF SERUM POTASSIUM: CPT

## 2018-12-11 PROCEDURE — 86901 BLOOD TYPING SEROLOGIC RH(D): CPT

## 2018-12-11 PROCEDURE — 86900 BLOOD TYPING SEROLOGIC ABO: CPT

## 2018-12-11 PROCEDURE — 85610 PROTHROMBIN TIME: CPT

## 2018-12-11 PROCEDURE — 86850 RBC ANTIBODY SCREEN: CPT

## 2018-12-11 PROCEDURE — 93005 ELECTROCARDIOGRAM TRACING: CPT

## 2018-12-11 PROCEDURE — 84484 ASSAY OF TROPONIN QUANT: CPT

## 2018-12-11 PROCEDURE — 81003 URINALYSIS AUTO W/O SCOPE: CPT

## 2018-12-11 PROCEDURE — 92610 EVALUATE SWALLOWING FUNCTION: CPT

## 2018-12-11 PROCEDURE — 83605 ASSAY OF LACTIC ACID: CPT

## 2018-12-11 PROCEDURE — 97162 PT EVAL MOD COMPLEX 30 MIN: CPT

## 2018-12-11 PROCEDURE — 80048 BASIC METABOLIC PNL TOTAL CA: CPT

## 2018-12-11 PROCEDURE — 87633 RESP VIRUS 12-25 TARGETS: CPT

## 2018-12-11 PROCEDURE — 99239 HOSP IP/OBS DSCHRG MGMT >30: CPT

## 2018-12-11 PROCEDURE — 99285 EMERGENCY DEPT VISIT HI MDM: CPT | Mod: 25

## 2018-12-11 PROCEDURE — 82330 ASSAY OF CALCIUM: CPT

## 2018-12-11 PROCEDURE — 97530 THERAPEUTIC ACTIVITIES: CPT

## 2018-12-11 PROCEDURE — 87798 DETECT AGENT NOS DNA AMP: CPT

## 2018-12-11 PROCEDURE — 93970 EXTREMITY STUDY: CPT

## 2018-12-11 PROCEDURE — 82435 ASSAY OF BLOOD CHLORIDE: CPT

## 2018-12-11 PROCEDURE — 87486 CHLMYD PNEUM DNA AMP PROBE: CPT

## 2018-12-11 PROCEDURE — 97116 GAIT TRAINING THERAPY: CPT

## 2018-12-11 PROCEDURE — 85730 THROMBOPLASTIN TIME PARTIAL: CPT

## 2018-12-11 PROCEDURE — 96374 THER/PROPH/DIAG INJ IV PUSH: CPT

## 2018-12-11 PROCEDURE — 84295 ASSAY OF SERUM SODIUM: CPT

## 2018-12-11 PROCEDURE — 80053 COMPREHEN METABOLIC PANEL: CPT

## 2018-12-11 PROCEDURE — 94640 AIRWAY INHALATION TREATMENT: CPT

## 2018-12-11 PROCEDURE — 85027 COMPLETE CBC AUTOMATED: CPT

## 2018-12-11 PROCEDURE — 83735 ASSAY OF MAGNESIUM: CPT

## 2018-12-11 PROCEDURE — 82962 GLUCOSE BLOOD TEST: CPT

## 2018-12-11 PROCEDURE — 87581 M.PNEUMON DNA AMP PROBE: CPT

## 2018-12-11 PROCEDURE — 83880 ASSAY OF NATRIURETIC PEPTIDE: CPT

## 2018-12-11 PROCEDURE — 87040 BLOOD CULTURE FOR BACTERIA: CPT

## 2018-12-11 PROCEDURE — 85014 HEMATOCRIT: CPT

## 2018-12-11 PROCEDURE — 71045 X-RAY EXAM CHEST 1 VIEW: CPT

## 2018-12-11 PROCEDURE — 82947 ASSAY GLUCOSE BLOOD QUANT: CPT

## 2018-12-11 PROCEDURE — 84100 ASSAY OF PHOSPHORUS: CPT

## 2018-12-11 PROCEDURE — 92611 MOTION FLUOROSCOPY/SWALLOW: CPT

## 2018-12-11 PROCEDURE — 74230 X-RAY XM SWLNG FUNCJ C+: CPT

## 2018-12-11 RX ORDER — METOPROLOL TARTRATE 50 MG
1 TABLET ORAL
Qty: 30 | Refills: 0 | OUTPATIENT
Start: 2018-12-11 | End: 2019-01-09

## 2018-12-11 RX ADMIN — OXYCODONE HYDROCHLORIDE 5 MILLIGRAM(S): 5 TABLET ORAL at 07:52

## 2018-12-11 RX ADMIN — HEPARIN SODIUM 5000 UNIT(S): 5000 INJECTION INTRAVENOUS; SUBCUTANEOUS at 06:43

## 2018-12-11 RX ADMIN — OXYCODONE HYDROCHLORIDE 5 MILLIGRAM(S): 5 TABLET ORAL at 09:54

## 2018-12-11 RX ADMIN — PANTOPRAZOLE SODIUM 40 MILLIGRAM(S): 20 TABLET, DELAYED RELEASE ORAL at 06:52

## 2018-12-11 RX ADMIN — Medication 75 MILLIGRAM(S): at 06:52

## 2018-12-11 RX ADMIN — Medication 50 MILLIGRAM(S): at 12:02

## 2018-12-11 RX ADMIN — CLOPIDOGREL BISULFATE 75 MILLIGRAM(S): 75 TABLET, FILM COATED ORAL at 12:02

## 2018-12-11 RX ADMIN — Medication 80 MILLIGRAM(S): at 06:43

## 2018-12-11 RX ADMIN — Medication 1: at 08:52

## 2018-12-11 RX ADMIN — Medication 81 MILLIGRAM(S): at 12:02

## 2018-12-11 RX ADMIN — BUDESONIDE AND FORMOTEROL FUMARATE DIHYDRATE 2 PUFF(S): 160; 4.5 AEROSOL RESPIRATORY (INHALATION) at 06:43

## 2018-12-11 RX ADMIN — SERTRALINE 100 MILLIGRAM(S): 25 TABLET, FILM COATED ORAL at 12:02

## 2018-12-11 RX ADMIN — Medication 90 MILLIGRAM(S): at 06:43

## 2018-12-11 NOTE — PROGRESS NOTE ADULT - ATTENDING COMMENTS
Pt seen and examined  Anecdotal reports of dyspnea, none at this time  Hx of orthopnea with upright sleep for > 20 years  He believes that his time living is truncated and in light of that information a choice to make comfort be paramount is prevailing philosophy of care.  His desire is to return home to his daughter's house, where he receives care from his son in law Primo 24/7.  Caregiver burden is tremendous.  He is willing to undertake pleasure feeds and  strategies to mitigate risk.  DNR/DNI    Time spent 16 min on ACP
D/w daughter updated on full plan of care
Stable for discharge home with hospice   Discharge time 50 min

## 2018-12-11 NOTE — PROGRESS NOTE ADULT - PROBLEM SELECTOR PROBLEM 9
Chronic back pain
Chronic back pain
Advance care planning
Advance care planning
Chronic back pain
Advance care planning

## 2018-12-11 NOTE — PROGRESS NOTE ADULT - PROBLEM SELECTOR PROBLEM 1
Acute respiratory failure with hypoxia

## 2018-12-11 NOTE — PROGRESS NOTE ADULT - PROBLEM SELECTOR PLAN 1
Patient presenting with worsening dyspnea with pulmonary edema secondary to acute on chronic HFpEF in the setting of known severe MR as well as moderate to severe aortic regurgitation with severe pulm HTN.  Per Dr. Bazzi who spoke with CT surgery, patient is not a candidate for an intervention on the mitral valve.  Continue supplemental oxygen, IV Lasix BID.  DNR order placed and form signed per patient's wishes.  plan is for discharge home with hospice today  pending O2 set up at home Patient presenting with worsening dyspnea with pulmonary edema secondary to acute on chronic HFpEF in the setting of known severe MR as well as moderate to severe aortic regurgitation with severe pulm HTN.  Per Dr. Bazzi who spoke with CT surgery, patient is not a candidate for an intervention on the mitral valve.  Continue supplemental oxygen, lasix 80mg qd  DNR order placed and form signed per patient's wishes.  plan is for discharge home with hospice today

## 2018-12-11 NOTE — PROGRESS NOTE ADULT - REASON FOR ADMISSION
shortness of breath x 4 days

## 2018-12-11 NOTE — PROGRESS NOTE ADULT - SUBJECTIVE AND OBJECTIVE BOX
Patient is a 83y old  Male who presents with a chief complaint of shortness of breath x 4 days (08 Dec 2018 16:50)      SUBJECTIVE / OVERNIGHT EVENTS: Pt examined on 12/11/2018. Denies headaches, SOB, N/V.  Reports mild tooth pain. Able tolerate breakfast and chewing cereal and had coffee.     MEDICATIONS  (STANDING):  aspirin enteric coated 81 milliGRAM(s) Oral daily  buDESOnide 160 MICROgram(s)/formoterol 4.5 MICROgram(s) Inhaler 2 Puff(s) Inhalation two times a day  clopidogrel Tablet 75 milliGRAM(s) Oral daily  dextrose 5%. 1000 milliLiter(s) (50 mL/Hr) IV Continuous <Continuous>  dextrose 50% Injectable 12.5 Gram(s) IV Push once  dextrose 50% Injectable 25 Gram(s) IV Push once  dextrose 50% Injectable 25 Gram(s) IV Push once  doxazosin 2 milliGRAM(s) Oral at bedtime  furosemide   Injectable 40 milliGRAM(s) IV Push every 12 hours  heparin  Injectable 5000 Unit(s) SubCutaneous every 12 hours  hydrALAZINE 75 milliGRAM(s) Oral <User Schedule>  insulin glargine Injectable (LANTUS) 5 Unit(s) SubCutaneous at bedtime  insulin lispro (HumaLOG) corrective regimen sliding scale   SubCutaneous three times a day before meals  insulin lispro (HumaLOG) corrective regimen sliding scale   SubCutaneous at bedtime  metoprolol tartrate 50 milliGRAM(s) Oral <User Schedule>  NIFEdipine XL 90 milliGRAM(s) Oral <User Schedule>  pantoprazole    Tablet 40 milliGRAM(s) Oral before breakfast  potassium chloride    Tablet ER 40 milliEquivalent(s) Oral once  senna 2 Tablet(s) Oral at bedtime  sertraline 100 milliGRAM(s) Oral daily  simvastatin 20 milliGRAM(s) Oral at bedtime    MEDICATIONS  (PRN):  acetaminophen   Tablet .. 650 milliGRAM(s) Oral every 6 hours PRN Moderate Pain (4 - 6)  dextrose 40% Gel 15 Gram(s) Oral once PRN Blood Glucose LESS THAN 70 milliGRAM(s)/deciliter  glucagon  Injectable 1 milliGRAM(s) IntraMuscular once PRN Glucose LESS THAN 70 milligrams/deciliter  oxyCODONE    Solution 5 milliGRAM(s) Oral every 3 hours PRN Moderate Pain (4 - 6)/dyspnea        CAPILLARY BLOOD GLUCOSE      POCT Blood Glucose.: 148 mg/dL (09 Dec 2018 12:39)  POCT Blood Glucose.: 179 mg/dL (09 Dec 2018 08:16)  POCT Blood Glucose.: 211 mg/dL (08 Dec 2018 22:20)  POCT Blood Glucose.: 157 mg/dL (08 Dec 2018 17:16)    I&O's Summary    08 Dec 2018 07:01  -  09 Dec 2018 07:00  --------------------------------------------------------  IN: 1450 mL / OUT: 1850 mL / NET: -400 mL    09 Dec 2018 07:01  -  09 Dec 2018 16:25  --------------------------------------------------------  IN: 0 mL / OUT: 750 mL / NET: -750 mL        PHYSICAL EXAM:  GENERAL: NAD, frail   HEAD:  Atraumatic, Normocephalic  EYES: conjunctiva and sclera clear  NECK:  No JVD  CHEST/LUNG: Clear to auscultation bilaterally; No wheeze  HEART: Regular rate and rhythm; S1S2  ABDOMEN: Soft, Nontender, Nondistended; Bowel sounds present  EXTREMITIES:  2+ Peripheral Pulses, No clubbing, cyanosis, or edema  PSYCH: AAOx1-2  SKIN: No rashes or lesions    LABS:                                   7.4    3.7   )-----------( 136      ( 10 Dec 2018 10:42 )             22.1   12-10    144  |  103  |  77<H>  ----------------------------<  138<H>  3.4<L>   |  25  |  2.36<H>    Ca    9.2      10 Dec 2018 10:39  Phos  4.6     12-10  Mg     2.3     12-10        Ca    9.5      09 Dec 2018 10:19  Mg     2.3     12-08                RADIOLOGY & ADDITIONAL TESTS:    Imaging Personally Reviewed:    Consultant(s) Notes Reviewed:      Care Discussed with Consultants/Other Providers: Patient is a 83y old  Male who presents with a chief complaint of shortness of breath x 4 days (08 Dec 2018 16:50)      SUBJECTIVE / OVERNIGHT EVENTS: Denies headaches, SOB, N/V.  Reports mild tooth pain. Able tolerate breakfast and chewing cereal and had coffee.     MEDICATIONS  (STANDING):  aspirin enteric coated 81 milliGRAM(s) Oral daily  buDESOnide 160 MICROgram(s)/formoterol 4.5 MICROgram(s) Inhaler 2 Puff(s) Inhalation two times a day  clopidogrel Tablet 75 milliGRAM(s) Oral daily  dextrose 5%. 1000 milliLiter(s) (50 mL/Hr) IV Continuous <Continuous>  dextrose 50% Injectable 12.5 Gram(s) IV Push once  dextrose 50% Injectable 25 Gram(s) IV Push once  dextrose 50% Injectable 25 Gram(s) IV Push once  doxazosin 2 milliGRAM(s) Oral at bedtime  furosemide   Injectable 40 milliGRAM(s) IV Push every 12 hours  heparin  Injectable 5000 Unit(s) SubCutaneous every 12 hours  hydrALAZINE 75 milliGRAM(s) Oral <User Schedule>  insulin glargine Injectable (LANTUS) 5 Unit(s) SubCutaneous at bedtime  insulin lispro (HumaLOG) corrective regimen sliding scale   SubCutaneous three times a day before meals  insulin lispro (HumaLOG) corrective regimen sliding scale   SubCutaneous at bedtime  metoprolol tartrate 50 milliGRAM(s) Oral <User Schedule>  NIFEdipine XL 90 milliGRAM(s) Oral <User Schedule>  pantoprazole    Tablet 40 milliGRAM(s) Oral before breakfast  potassium chloride    Tablet ER 40 milliEquivalent(s) Oral once  senna 2 Tablet(s) Oral at bedtime  sertraline 100 milliGRAM(s) Oral daily  simvastatin 20 milliGRAM(s) Oral at bedtime    MEDICATIONS  (PRN):  acetaminophen   Tablet .. 650 milliGRAM(s) Oral every 6 hours PRN Moderate Pain (4 - 6)  dextrose 40% Gel 15 Gram(s) Oral once PRN Blood Glucose LESS THAN 70 milliGRAM(s)/deciliter  glucagon  Injectable 1 milliGRAM(s) IntraMuscular once PRN Glucose LESS THAN 70 milligrams/deciliter  oxyCODONE    Solution 5 milliGRAM(s) Oral every 3 hours PRN Moderate Pain (4 - 6)/dyspnea        CAPILLARY BLOOD GLUCOSE      POCT Blood Glucose.: 148 mg/dL (09 Dec 2018 12:39)  POCT Blood Glucose.: 179 mg/dL (09 Dec 2018 08:16)  POCT Blood Glucose.: 211 mg/dL (08 Dec 2018 22:20)  POCT Blood Glucose.: 157 mg/dL (08 Dec 2018 17:16)    I&O's Summary    08 Dec 2018 07:01  -  09 Dec 2018 07:00  --------------------------------------------------------  IN: 1450 mL / OUT: 1850 mL / NET: -400 mL    09 Dec 2018 07:01  -  09 Dec 2018 16:25  --------------------------------------------------------  IN: 0 mL / OUT: 750 mL / NET: -750 mL        PHYSICAL EXAM:  GENERAL: NAD, frail   HEAD:  Atraumatic, Normocephalic  EYES: conjunctiva and sclera clear  NECK:  No JVD  CHEST/LUNG: Clear to auscultation bilaterally; No wheeze  HEART: Regular rate and rhythm; S1S2  ABDOMEN: Soft, Nontender, Nondistended; Bowel sounds present  EXTREMITIES:  2+ Peripheral Pulses, No clubbing, cyanosis, or edema  PSYCH: AAOx1-2  SKIN: No rashes or lesions    LABS:                                   7.4    3.7   )-----------( 136      ( 10 Dec 2018 10:42 )             22.1   12-10    144  |  103  |  77<H>  ----------------------------<  138<H>  3.4<L>   |  25  |  2.36<H>    Ca    9.2      10 Dec 2018 10:39  Phos  4.6     12-10  Mg     2.3     12-10        Ca    9.5      09 Dec 2018 10:19  Mg     2.3     12-08                RADIOLOGY & ADDITIONAL TESTS:    Imaging Personally Reviewed:    Consultant(s) Notes Reviewed:      Care Discussed with Consultants/Other Providers:

## 2018-12-11 NOTE — PROGRESS NOTE ADULT - PROBLEM SELECTOR PLAN 9
Tylenol prn   Oxycodone 5 mg prn q8
Tylenol prn   Oxycodone 5 mg prn q8
Patient endorsed to me that he would NOT want CPR or mechanical ventilation. He feels he had lived a good life and overall quality of life has somewhat diminished with his comorbid conditions.  DNR order placed and form signed.
Patient endorsing he would NOT want CPR or mechanical ventilation. He feels he had lived a good life and overall quality of life has somewhat diminished with his comorbid conditions.
Tylenol prn   Oxycodone 5 mg prn q8
Patient endorsed to me last night that he would NOT want CPR or mechanical ventilation. He feels he had lived a good life and overall quality of life has somewhat diminished with his comorbid conditions.  DNR order placed and form signed.

## 2018-12-11 NOTE — PROGRESS NOTE ADULT - PROBLEM SELECTOR PLAN 10
Heparin SC for DVT prophylaxis.    Pt seen by Dental service advised for pain control and followup in outpatient dental office. Pt seen by Dental service advised for pain control and followup in outpatient dental office.

## 2019-07-30 NOTE — H&P ADULT - PROBLEM/PLAN-4
Prescription sent to preferred pharmacy.  It had been sent incorrectly to [facility].  
DISPLAY PLAN FREE TEXT

## 2020-07-17 NOTE — ED ADULT TRIAGE NOTE - BP NONINVASIVE DIASTOLIC (MM HG)
52 Consent (Nose)/Introductory Paragraph: The rationale for Mohs was explained to the patient and consent was obtained. The risks, benefits and alternatives to therapy were discussed in detail. Specifically, the risks of nasal deformity, changes in the flow of air through the nose, infection, scarring, bleeding, prolonged wound healing, incomplete removal, allergy to anesthesia, nerve injury and recurrence were addressed. Prior to the procedure, the treatment site was clearly identified and confirmed by the patient. All components of Universal Protocol/PAUSE Rule completed.

## 2022-01-05 NOTE — PROGRESS NOTE ADULT - PROBLEM SELECTOR PROBLEM 6
I spoke with the patient about this.     Type 2 diabetes mellitus Coronary artery disease involving native coronary artery of native heart without angina pectoris

## 2022-05-30 NOTE — PHYSICAL THERAPY INITIAL EVALUATION ADULT - LIVES WITH, PROFILE
Rounding (Video Assessment):  Yes    Comments: Video assessment complete. Pt lying in bed on BIPAP. Pt appears asleep. VSS and NAD noted.    children

## 2022-08-26 NOTE — H&P ADULT - PROBLEM SELECTOR PROBLEM 3
on the discharge service for the patient. I have reviewed and made amendments to the documentation where necessary. Other chest pain

## 2023-03-02 NOTE — SWALLOW BEDSIDE ASSESSMENT ADULT - CONSISTENCIES ADMINISTERED
nectar thick/puree/honey thick Implemented All Fall Risk Interventions:  [place] to call system. Call bell, personal items and telephone within reach. Instruct patient to call for assistance. Room bathroom lighting operational. Non-slip footwear when patient is off stretcher. Physically safe environment: no spills, clutter or unnecessary equipment. Stretcher in lowest position, wheels locked, appropriate side rails in place. Provide visual cue, wrist band, yellow gown, etc. Monitor gait and stability. Monitor for mental status changes and reorient to person, place, and time. Review medications for side effects contributing to fall risk. Reinforce activity limits and safety measures with patient and family.

## 2024-05-21 NOTE — PROVIDER CONTACT NOTE (OTHER) - DATE AND TIME:
03-Dec-2018 16:02
Quality 226: Preventive Care And Screening: Tobacco Use: Screening And Cessation Intervention: Patient screened for tobacco use and is an ex/non-smoker
Quality 130: Documentation Of Current Medications In The Medical Record: Current Medications Documented
Detail Level: Detailed

## 2024-11-03 NOTE — PROGRESS NOTE ADULT - PROBLEM SELECTOR PLAN 10
show
- pt requests full code  - pt has 4 children, but wants his daughter Mary Perez to make decisions for him if pt is unable to.  - pt requests full medical management
- pt requests full code  - pt has 4 children, but wants his daughter Mary Perez to make decisions for him if pt is unable to.
- pt requests full code  - pt has 4 children, but wants his daughter Mary Perez to make decisions for him if pt is unable to.  - pt requests full medical management
- pt requests full code  - pt has 4 children, but wants his daughter Mary Preez to make decisions for him if pt is unable to.  - pt requests full medical management
